# Patient Record
Sex: FEMALE | Race: WHITE | NOT HISPANIC OR LATINO | URBAN - METROPOLITAN AREA
[De-identification: names, ages, dates, MRNs, and addresses within clinical notes are randomized per-mention and may not be internally consistent; named-entity substitution may affect disease eponyms.]

---

## 2021-05-03 ENCOUNTER — PREPPED CHART (OUTPATIENT)
Dept: URBAN - METROPOLITAN AREA CLINIC 40 | Facility: CLINIC | Age: 86
End: 2021-05-03

## 2021-05-03 PROBLEM — H40.013 GLAUCOMA SUSPECT, LOW RISK: Noted: 2021-05-03

## 2021-11-01 ENCOUNTER — FOLLOW UP (OUTPATIENT)
Dept: URBAN - METROPOLITAN AREA CLINIC 40 | Facility: CLINIC | Age: 86
End: 2021-11-01

## 2021-11-01 DIAGNOSIS — H40.013: ICD-10-CM

## 2021-11-01 DIAGNOSIS — H53.2: ICD-10-CM

## 2021-11-01 PROCEDURE — 92250 FUNDUS PHOTOGRAPHY W/I&R: CPT

## 2021-11-01 PROCEDURE — 92014 COMPRE OPH EXAM EST PT 1/>: CPT

## 2021-11-01 ASSESSMENT — TONOMETRY
OD_IOP_MMHG: 13
OS_IOP_MMHG: 16

## 2021-11-01 ASSESSMENT — VISUAL ACUITY
OU_CC: J1
OD_CC: 20/30-2
OS_CC: 20/30

## 2022-05-02 ENCOUNTER — FOLLOW UP (OUTPATIENT)
Dept: URBAN - METROPOLITAN AREA CLINIC 40 | Facility: CLINIC | Age: 87
End: 2022-05-02

## 2022-05-02 DIAGNOSIS — H53.2: ICD-10-CM

## 2022-05-02 DIAGNOSIS — H04.123: ICD-10-CM

## 2022-05-02 DIAGNOSIS — H40.013: ICD-10-CM

## 2022-05-02 PROCEDURE — 92133 CPTRZD OPH DX IMG PST SGM ON: CPT

## 2022-05-02 PROCEDURE — 92012 INTRM OPH EXAM EST PATIENT: CPT

## 2022-05-02 ASSESSMENT — VISUAL ACUITY
OD_CC: 20/30
OU_CC: J1+
OS_CC: 20/30

## 2022-05-02 ASSESSMENT — TONOMETRY
OS_IOP_MMHG: 12
OD_IOP_MMHG: 13

## 2022-06-25 ENCOUNTER — EMERGENCY (EMERGENCY)
Facility: HOSPITAL | Age: 87
LOS: 0 days | Discharge: HOME | End: 2022-06-25
Attending: EMERGENCY MEDICINE
Payer: MEDICARE

## 2022-06-25 VITALS
OXYGEN SATURATION: 98 % | HEART RATE: 71 BPM | RESPIRATION RATE: 16 BRPM | SYSTOLIC BLOOD PRESSURE: 158 MMHG | TEMPERATURE: 98 F | HEIGHT: 58 IN | WEIGHT: 121.92 LBS | DIASTOLIC BLOOD PRESSURE: 80 MMHG

## 2022-06-25 VITALS
OXYGEN SATURATION: 96 % | HEART RATE: 70 BPM | TEMPERATURE: 98 F | RESPIRATION RATE: 17 BRPM | DIASTOLIC BLOOD PRESSURE: 62 MMHG | SYSTOLIC BLOOD PRESSURE: 118 MMHG
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
## 2022-06-25 DIAGNOSIS — S09.90XA UNSPECIFIED INJURY OF HEAD, INITIAL ENCOUNTER: ICD-10-CM
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
## 2022-06-25 DIAGNOSIS — S06.0X0A CONCUSSION WITHOUT LOSS OF CONSCIOUSNESS, INITIAL ENCOUNTER: ICD-10-CM

## 2022-06-25 DIAGNOSIS — Z20.822 CONTACT WITH AND (SUSPECTED) EXPOSURE TO COVID-19: ICD-10-CM

## 2022-06-25 PROCEDURE — 72125 CT NECK SPINE W/O DYE: CPT | Mod: 26,MA

## 2022-06-25 PROCEDURE — 70450 CT HEAD/BRAIN W/O DYE: CPT | Mod: 26,MA

## 2022-06-25 PROCEDURE — 70450 CT HEAD/BRAIN W/O DYE: CPT | Mod: MA

## 2022-06-25 PROCEDURE — 87635 SARS-COV-2 COVID-19 AMP PRB: CPT

## 2022-06-25 PROCEDURE — 99284 EMERGENCY DEPT VISIT MOD MDM: CPT | Mod: 25

## 2022-06-25 PROCEDURE — 72125 CT NECK SPINE W/O DYE: CPT | Mod: MA

## 2022-06-25 PROCEDURE — 99283 EMERGENCY DEPT VISIT LOW MDM: CPT

## 2022-06-25 NOTE — ED PROVIDER NOTE - PATIENT PORTAL LINK FT
You can access the FollowMyHealth Patient Portal offered by [facility] by registering at the following website: [email or web address]. By joining [facility]’s FollowMyHealth portal, you will also be able to view your health information using other applications (apps) compatible with our system.

## 2022-06-25 NOTE — ED PROVIDER NOTE - CLINICAL SUMMARY MEDICAL DECISION MAKING FREE TEXT BOX
at [facility] mental status scans negative return to ed for intractable HA, persistent vomiting, or new onset motor/sensory deficits

## 2022-06-25 NOTE — ED ADULT TRIAGE NOTE - CHIEF COMPLAINT QUOTE
walking to bathroom sustained mechanical fall, not on anticoagulants, no LOC. + head strike. hematoma to right posterior occiput, lump noted. no active bleeding. pt at baseline mental status, GCS 15. pt made NA and taken to catscan.

## 2022-06-25 NOTE — ED PROVIDER NOTE - OBJECTIVE STATEMENT
pt presents after mechanical fall from nursing home denies complaints no blood thinners no vision chnages. not on blood thinners. no chest pain no sob no abd pain no loc

## 2022-06-25 NOTE — ED ADULT TRIAGE NOTE - ARRIVAL FROM
Patient is dead based on Cardiopulmonary criteria including absent breath sounds, pulselessness and/or asystole
Nursing home

## 2022-06-25 NOTE — ED ADULT NURSE NOTE - OBJECTIVE STATEMENT
Pt was walking to bathroom sustained mechanical fall, not on anticoagulants, no LOC. + head strike. hematoma to right posterior occiput, lump noted. no active bleeding. pt at baseline mental status, GCS 15. pt made NA and taken to catscan. Covid swab completed.

## 2022-10-29 ENCOUNTER — TRANSCRIPTION ENCOUNTER (OUTPATIENT)
Age: 87
End: 2022-10-29

## 2022-10-29 ENCOUNTER — INPATIENT (INPATIENT)
Facility: HOSPITAL | Age: 87
LOS: 5 days | Discharge: NURSING, REHAB OR LONG-TERM CARE | DRG: 73 | End: 2022-11-04
Attending: HOSPITALIST | Admitting: FAMILY MEDICINE
Payer: MEDICARE

## 2022-10-29 VITALS
HEART RATE: 70 BPM | RESPIRATION RATE: 15 BRPM | WEIGHT: 126.99 LBS | OXYGEN SATURATION: 95 % | HEIGHT: 58 IN | SYSTOLIC BLOOD PRESSURE: 124 MMHG | DIASTOLIC BLOOD PRESSURE: 58 MMHG | TEMPERATURE: 98 F

## 2022-10-29 DIAGNOSIS — R77.8 OTHER SPECIFIED ABNORMALITIES OF PLASMA PROTEINS: ICD-10-CM

## 2022-10-29 LAB
ALBUMIN SERPL ELPH-MCNC: 3.3 G/DL — SIGNIFICANT CHANGE UP (ref 3.3–5)
ALP SERPL-CCNC: 56 U/L — SIGNIFICANT CHANGE UP (ref 40–120)
ALT FLD-CCNC: 23 U/L — SIGNIFICANT CHANGE UP (ref 12–78)
ANION GAP SERPL CALC-SCNC: 5 MMOL/L — SIGNIFICANT CHANGE UP (ref 5–17)
APPEARANCE UR: CLEAR — SIGNIFICANT CHANGE UP
APTT BLD: 27.5 SEC — SIGNIFICANT CHANGE UP (ref 27.5–35.5)
AST SERPL-CCNC: 18 U/L — SIGNIFICANT CHANGE UP (ref 15–37)
BASOPHILS # BLD AUTO: 0.05 K/UL — SIGNIFICANT CHANGE UP (ref 0–0.2)
BASOPHILS NFR BLD AUTO: 0.8 % — SIGNIFICANT CHANGE UP (ref 0–2)
BILIRUB SERPL-MCNC: 0.3 MG/DL — SIGNIFICANT CHANGE UP (ref 0.2–1.2)
BILIRUB UR-MCNC: NEGATIVE — SIGNIFICANT CHANGE UP
BUN SERPL-MCNC: 27 MG/DL — HIGH (ref 7–23)
CALCIUM SERPL-MCNC: 9.2 MG/DL — SIGNIFICANT CHANGE UP (ref 8.5–10.1)
CHLORIDE SERPL-SCNC: 104 MMOL/L — SIGNIFICANT CHANGE UP (ref 96–108)
CO2 SERPL-SCNC: 26 MMOL/L — SIGNIFICANT CHANGE UP (ref 22–31)
COLOR SPEC: YELLOW — SIGNIFICANT CHANGE UP
CREAT SERPL-MCNC: 0.67 MG/DL — SIGNIFICANT CHANGE UP (ref 0.5–1.3)
DIFF PNL FLD: NEGATIVE — SIGNIFICANT CHANGE UP
EGFR: 84 ML/MIN/1.73M2 — SIGNIFICANT CHANGE UP
EOSINOPHIL # BLD AUTO: 0.31 K/UL — SIGNIFICANT CHANGE UP (ref 0–0.5)
EOSINOPHIL NFR BLD AUTO: 4.9 % — SIGNIFICANT CHANGE UP (ref 0–6)
FLUAV AG NPH QL: SIGNIFICANT CHANGE UP
FLUBV AG NPH QL: SIGNIFICANT CHANGE UP
GLUCOSE SERPL-MCNC: 80 MG/DL — SIGNIFICANT CHANGE UP (ref 70–99)
GLUCOSE UR QL: NEGATIVE — SIGNIFICANT CHANGE UP
HCT VFR BLD CALC: 32.5 % — LOW (ref 34.5–45)
HGB BLD-MCNC: 10.6 G/DL — LOW (ref 11.5–15.5)
IMM GRANULOCYTES NFR BLD AUTO: 0.2 % — SIGNIFICANT CHANGE UP (ref 0–0.9)
INR BLD: 0.97 RATIO — SIGNIFICANT CHANGE UP (ref 0.88–1.16)
KETONES UR-MCNC: NEGATIVE — SIGNIFICANT CHANGE UP
LEUKOCYTE ESTERASE UR-ACNC: NEGATIVE — SIGNIFICANT CHANGE UP
LYMPHOCYTES # BLD AUTO: 1.56 K/UL — SIGNIFICANT CHANGE UP (ref 1–3.3)
LYMPHOCYTES # BLD AUTO: 24.4 % — SIGNIFICANT CHANGE UP (ref 13–44)
MCHC RBC-ENTMCNC: 29.7 PG — SIGNIFICANT CHANGE UP (ref 27–34)
MCHC RBC-ENTMCNC: 32.6 GM/DL — SIGNIFICANT CHANGE UP (ref 32–36)
MCV RBC AUTO: 91 FL — SIGNIFICANT CHANGE UP (ref 80–100)
MONOCYTES # BLD AUTO: 0.63 K/UL — SIGNIFICANT CHANGE UP (ref 0–0.9)
MONOCYTES NFR BLD AUTO: 9.9 % — SIGNIFICANT CHANGE UP (ref 2–14)
NEUTROPHILS # BLD AUTO: 3.83 K/UL — SIGNIFICANT CHANGE UP (ref 1.8–7.4)
NEUTROPHILS NFR BLD AUTO: 59.8 % — SIGNIFICANT CHANGE UP (ref 43–77)
NITRITE UR-MCNC: NEGATIVE — SIGNIFICANT CHANGE UP
PH UR: 6.5 — SIGNIFICANT CHANGE UP (ref 5–8)
PLATELET # BLD AUTO: 281 K/UL — SIGNIFICANT CHANGE UP (ref 150–400)
POTASSIUM SERPL-MCNC: 4.3 MMOL/L — SIGNIFICANT CHANGE UP (ref 3.5–5.3)
POTASSIUM SERPL-SCNC: 4.3 MMOL/L — SIGNIFICANT CHANGE UP (ref 3.5–5.3)
PROT SERPL-MCNC: 7.8 GM/DL — SIGNIFICANT CHANGE UP (ref 6–8.3)
PROT UR-MCNC: NEGATIVE — SIGNIFICANT CHANGE UP
PROTHROM AB SERPL-ACNC: 11.3 SEC — SIGNIFICANT CHANGE UP (ref 10.5–13.4)
RBC # BLD: 3.57 M/UL — LOW (ref 3.8–5.2)
RBC # FLD: 12.3 % — SIGNIFICANT CHANGE UP (ref 10.3–14.5)
RSV RNA NPH QL NAA+NON-PROBE: SIGNIFICANT CHANGE UP
SARS-COV-2 RNA SPEC QL NAA+PROBE: DETECTED
SODIUM SERPL-SCNC: 135 MMOL/L — SIGNIFICANT CHANGE UP (ref 135–145)
SP GR SPEC: 1.01 — SIGNIFICANT CHANGE UP (ref 1.01–1.02)
TROPONIN I, HIGH SENSITIVITY RESULT: 256.37 NG/L — HIGH
UROBILINOGEN FLD QL: NEGATIVE — SIGNIFICANT CHANGE UP
WBC # BLD: 6.39 K/UL — SIGNIFICANT CHANGE UP (ref 3.8–10.5)
WBC # FLD AUTO: 6.39 K/UL — SIGNIFICANT CHANGE UP (ref 3.8–10.5)

## 2022-10-29 PROCEDURE — 72125 CT NECK SPINE W/O DYE: CPT | Mod: 26,MA

## 2022-10-29 PROCEDURE — 72157 MRI CHEST SPINE W/O & W/DYE: CPT

## 2022-10-29 PROCEDURE — 72158 MRI LUMBAR SPINE W/O & W/DYE: CPT

## 2022-10-29 PROCEDURE — 83010 ASSAY OF HAPTOGLOBIN QUANT: CPT

## 2022-10-29 PROCEDURE — U0003: CPT

## 2022-10-29 PROCEDURE — U0005: CPT

## 2022-10-29 PROCEDURE — 84484 ASSAY OF TROPONIN QUANT: CPT

## 2022-10-29 PROCEDURE — 97162 PT EVAL MOD COMPLEX 30 MIN: CPT | Mod: GP

## 2022-10-29 PROCEDURE — 85045 AUTOMATED RETICULOCYTE COUNT: CPT

## 2022-10-29 PROCEDURE — 86140 C-REACTIVE PROTEIN: CPT

## 2022-10-29 PROCEDURE — C1729: CPT

## 2022-10-29 PROCEDURE — 71552 MRI CHEST W/O & W/DYE: CPT

## 2022-10-29 PROCEDURE — 87389 HIV-1 AG W/HIV-1&-2 AB AG IA: CPT

## 2022-10-29 PROCEDURE — 84443 ASSAY THYROID STIM HORMONE: CPT

## 2022-10-29 PROCEDURE — 85652 RBC SED RATE AUTOMATED: CPT

## 2022-10-29 PROCEDURE — 70450 CT HEAD/BRAIN W/O DYE: CPT | Mod: 26,MA

## 2022-10-29 PROCEDURE — 70547 MR ANGIOGRAPHY NECK W/O DYE: CPT

## 2022-10-29 PROCEDURE — 83550 IRON BINDING TEST: CPT

## 2022-10-29 PROCEDURE — 71045 X-RAY EXAM CHEST 1 VIEW: CPT | Mod: 26

## 2022-10-29 PROCEDURE — 85027 COMPLETE CBC AUTOMATED: CPT

## 2022-10-29 PROCEDURE — 83615 LACTATE (LD) (LDH) ENZYME: CPT

## 2022-10-29 PROCEDURE — 36415 COLL VENOUS BLD VENIPUNCTURE: CPT

## 2022-10-29 PROCEDURE — 80061 LIPID PANEL: CPT

## 2022-10-29 PROCEDURE — 97165 OT EVAL LOW COMPLEX 30 MIN: CPT | Mod: GO

## 2022-10-29 PROCEDURE — 72156 MRI NECK SPINE W/O & W/DYE: CPT

## 2022-10-29 PROCEDURE — C1769: CPT

## 2022-10-29 PROCEDURE — 97116 GAIT TRAINING THERAPY: CPT | Mod: GP

## 2022-10-29 PROCEDURE — 70553 MRI BRAIN STEM W/O & W/DYE: CPT

## 2022-10-29 PROCEDURE — C1887: CPT

## 2022-10-29 PROCEDURE — 93005 ELECTROCARDIOGRAM TRACING: CPT

## 2022-10-29 PROCEDURE — 99223 1ST HOSP IP/OBS HIGH 75: CPT

## 2022-10-29 PROCEDURE — 86703 HIV-1/HIV-2 1 RESULT ANTBDY: CPT

## 2022-10-29 PROCEDURE — 73130 X-RAY EXAM OF HAND: CPT | Mod: 26,RT

## 2022-10-29 PROCEDURE — 93010 ELECTROCARDIOGRAM REPORT: CPT

## 2022-10-29 PROCEDURE — 80048 BASIC METABOLIC PNL TOTAL CA: CPT

## 2022-10-29 PROCEDURE — 83036 HEMOGLOBIN GLYCOSYLATED A1C: CPT

## 2022-10-29 PROCEDURE — 72126 CT NECK SPINE W/DYE: CPT

## 2022-10-29 PROCEDURE — 85025 COMPLETE CBC W/AUTO DIFF WBC: CPT

## 2022-10-29 PROCEDURE — 97110 THERAPEUTIC EXERCISES: CPT | Mod: GO

## 2022-10-29 PROCEDURE — 93306 TTE W/DOPPLER COMPLETE: CPT

## 2022-10-29 PROCEDURE — 82607 VITAMIN B-12: CPT

## 2022-10-29 PROCEDURE — A9579: CPT

## 2022-10-29 PROCEDURE — 97535 SELF CARE MNGMENT TRAINING: CPT | Mod: GO

## 2022-10-29 PROCEDURE — 83540 ASSAY OF IRON: CPT

## 2022-10-29 PROCEDURE — 97530 THERAPEUTIC ACTIVITIES: CPT | Mod: GP

## 2022-10-29 PROCEDURE — 80074 ACUTE HEPATITIS PANEL: CPT

## 2022-10-29 PROCEDURE — 99285 EMERGENCY DEPT VISIT HI MDM: CPT | Mod: CS

## 2022-10-29 PROCEDURE — C1894: CPT

## 2022-10-29 PROCEDURE — 76536 US EXAM OF HEAD AND NECK: CPT

## 2022-10-29 PROCEDURE — 70544 MR ANGIOGRAPHY HEAD W/O DYE: CPT

## 2022-10-29 PROCEDURE — 82728 ASSAY OF FERRITIN: CPT

## 2022-10-29 RX ORDER — ATORVASTATIN CALCIUM 80 MG/1
10 TABLET, FILM COATED ORAL AT BEDTIME
Refills: 0 | Status: DISCONTINUED | OUTPATIENT
Start: 2022-10-29 | End: 2022-10-29

## 2022-10-29 RX ORDER — SUCRALFATE 1 G
1 TABLET ORAL
Refills: 0 | Status: DISCONTINUED | OUTPATIENT
Start: 2022-10-29 | End: 2022-11-04

## 2022-10-29 RX ORDER — ONDANSETRON 8 MG/1
4 TABLET, FILM COATED ORAL EVERY 8 HOURS
Refills: 0 | Status: DISCONTINUED | OUTPATIENT
Start: 2022-10-29 | End: 2022-11-04

## 2022-10-29 RX ORDER — LANOLIN ALCOHOL/MO/W.PET/CERES
3 CREAM (GRAM) TOPICAL AT BEDTIME
Refills: 0 | Status: DISCONTINUED | OUTPATIENT
Start: 2022-10-29 | End: 2022-11-04

## 2022-10-29 RX ORDER — ASPIRIN/CALCIUM CARB/MAGNESIUM 324 MG
81 TABLET ORAL DAILY
Refills: 0 | Status: DISCONTINUED | OUTPATIENT
Start: 2022-10-29 | End: 2022-11-04

## 2022-10-29 RX ORDER — METOPROLOL TARTRATE 50 MG
25 TABLET ORAL DAILY
Refills: 0 | Status: DISCONTINUED | OUTPATIENT
Start: 2022-10-29 | End: 2022-11-04

## 2022-10-29 RX ORDER — ASPIRIN/CALCIUM CARB/MAGNESIUM 324 MG
324 TABLET ORAL ONCE
Refills: 0 | Status: COMPLETED | OUTPATIENT
Start: 2022-10-29 | End: 2022-10-29

## 2022-10-29 RX ORDER — ACETAMINOPHEN 500 MG
650 TABLET ORAL EVERY 6 HOURS
Refills: 0 | Status: ACTIVE | OUTPATIENT
Start: 2022-10-29 | End: 2023-09-27

## 2022-10-29 RX ORDER — HEPARIN SODIUM 5000 [USP'U]/ML
5000 INJECTION INTRAVENOUS; SUBCUTANEOUS EVERY 8 HOURS
Refills: 0 | Status: DISCONTINUED | OUTPATIENT
Start: 2022-10-29 | End: 2022-11-04

## 2022-10-29 RX ORDER — LEVOTHYROXINE SODIUM 125 MCG
100 TABLET ORAL DAILY
Refills: 0 | Status: DISCONTINUED | OUTPATIENT
Start: 2022-10-29 | End: 2022-11-04

## 2022-10-29 RX ADMIN — Medication 25 MILLIGRAM(S): at 23:42

## 2022-10-29 RX ADMIN — Medication 324 MILLIGRAM(S): at 16:17

## 2022-10-29 NOTE — H&P ADULT - ASSESSMENT
89-year-old female who was sent to the emergency room from [facility] because she developed right hand to finger paralysis.  The patient's symptoms started last night and she did not want to come to the ER last night waiting for her symptoms to improve in the morning.  After her symptoms did not improve it was referred to the emergency room. The patient was diagnosed with Covid 19 infection a month ago. The patient was recently diagnosed with right breast mass and biopsy was recommended and scheduled as an outpatient.  The patient is also complaining of a Baker's cyst in the right knee which she wants to be evaluated for by orthopedics.  assessment Dx:                       1. 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 R breast mass                        3. Thoracic vertebral metastasis                        4. Elevated Troponin  r/o MI                        5. COVID 19 positive likely old infection                       6. Santiago cyst R knee                        7.  LS stenosis                        8.  HTN                       9.  Hypothyroidism                       10. RA     Plan:    admit to Telemetry r/o CVA, R/O MI                medications: as per med rec.                VTEP: Heparin                Labs: troponins, cbc, bmp                Radiology: CTH, MRI brain                Cardiac diagnostics: EKG , Troponins trending                Consults: Neurology, Cardiology, Ortho                Advance Directive: full code,

## 2022-10-29 NOTE — ED ADULT NURSE REASSESSMENT NOTE - NS ED NURSE REASSESS COMMENT FT1
Assumed care of Pt from ROBERT Weston. Pt received resting comfortably in stretcher. Vital signs reassessed and stable at this time. Provided Pt with additional pillows as per request. Provided Pt with phone to call son. Administered aspirin as per order. No additional requests or complaints. Patient safety maintained. Will continue to monitor. Debridement Text: The wound edges were debrided prior to proceeding with the closure to facilitate wound healing.

## 2022-10-29 NOTE — ED PROVIDER NOTE - OBJECTIVE STATEMENT
89 yr old female with PMHx of arthritis, TIA presents to the ED c/o weakness to the right digits. Pt states she suddenly couldn't move fingers on right hand. Pt denies numb or tingling feeling. Pt can feel with fingers. PT has had TIA before. Pt is on aspirin but not any blood thinners. Pt also notes of neck pain 89 yr old female with PMHx of arthritis, TIA presents to the ED c/o weakness to the right digits since yesterday night. Pt states she suddenly couldn't move fingers on right hand. Pt denies numb or tingling feeling. Pt can feel with fingers. Pt is on aspirin but not any blood thinners. Pt also notes of neck pain

## 2022-10-29 NOTE — H&P ADULT - NSHPREVIEWOFSYSTEMS_GEN_ALL_CORE
ROS:   Eyes: no changes in vision  ENT/Mouth: no changes    Cardiovascular: no chest pain    Respiratory: no SOB    Gastrointestinal: no diarrhea, no nausea, no vomiting    Genitourinary: no dysuria    Breast: no pain    Musculoskeletal: no pain, Right wrist weakness unable to extend however she is able to flex  Wrist and fingers  Integumentary: no itching    Neurological: No Headache, no tremor,    Endocrine: no excessive thirst,     Allergic/Immunologic: no itching ROS:   Eyes: no changes in vision  ENT/Mouth: no changes    Cardiovascular: no chest pain    Respiratory: no SOB    Gastrointestinal: no diarrhea, no nausea, no vomiting    Genitourinary: no dysuria    Breast: no pain    Musculoskeletal: no pain, Right wrist weakness unable to extend however she is able to flex  Wrist and fingers  Integumentary: no itching    Neurological: No Headache, no tremor, no slurred speech, R wrist weakness , unable to extend.     Endocrine: no excessive thirst,     Allergic/Immunologic: no itching

## 2022-10-29 NOTE — ED ADULT NURSE REASSESSMENT NOTE - NS ED NURSE REASSESS COMMENT FT1
Pt resting comfortably in stretcher. Vital signs reassessed and stable at this time. Provided Pt with menu to order dinner, call to food and nutrition to place Pt's order. Repositioned Pt in bed to assist with comfort. No additional requests or complaints. Patient safety maintained. Will continue to monitor.

## 2022-10-29 NOTE — H&P ADULT - NSHPPHYSICALEXAM_GEN_ALL_CORE
Physical Exam: Vital Signs Last 24 Hrs  T(C): 37.1 (29 Oct 2022 15:14), Max: 37.1 (29 Oct 2022 15:14)  T(F): 98.7 (29 Oct 2022 15:14), Max: 98.7 (29 Oct 2022 15:14)  HR: 82 (29 Oct 2022 16:00) (70 - 87)  BP: 137/68 (29 Oct 2022 16:00) (124/58 - 141/77)  BP(mean): 90 (29 Oct 2022 16:00) (90 - 95)  RR: 18 (29 Oct 2022 16:00) (15 - 18)  SpO2: 98% (29 Oct 2022 16:00) (95% - 98%)    Parameters below as of 29 Oct 2022 16:00  Patient On (Oxygen Delivery Method): room air      HEENT: PRRL EOMI    MOUTH/TEETH/GUMS: Clear    NECK: no JVD    LUNGS: Clear    HEART: S1,S2 RR    ABDOMEN: soft nontender    EXTREMITIES:  no pedal edema    MUSCULOSKELETAL: Arthritic changes, unable to extend right wrist    NEURO: no tremor,Right wrist drop, unable to extend, able to flex right wrist and fingers    SKIN: no rash    : CVA negative, Physical Exam: Vital Signs Last 24 Hrs  T(C): 37.1 (29 Oct 2022 15:14), Max: 37.1 (29 Oct 2022 15:14)  T(F): 98.7 (29 Oct 2022 15:14), Max: 98.7 (29 Oct 2022 15:14)  HR: 82 (29 Oct 2022 16:00) (70 - 87)  BP: 137/68 (29 Oct 2022 16:00) (124/58 - 141/77)  BP(mean): 90 (29 Oct 2022 16:00) (90 - 95)  RR: 18 (29 Oct 2022 16:00) (15 - 18)  SpO2: 98% (29 Oct 2022 16:00) (95% - 98%)    Parameters below as of 29 Oct 2022 16:00  Patient On (Oxygen Delivery Method): room air      HEENT: PRRL EOMI    MOUTH/TEETH/GUMS: Clear    NECK: no JVD    LUNGS: Clear    HEART: S1,S2 RR    ABDOMEN: soft nontender    EXTREMITIES:  no pedal edema    MUSCULOSKELETAL: Arthritic changes, unable to extend right wrist    NEURO: AO x 3,  no tremor,Right wrist drop, unable to extend, able to flex right wrist and fingers    SKIN: no rash    : CVA negative,

## 2022-10-29 NOTE — ED ADULT TRIAGE NOTE - CHIEF COMPLAINT QUOTE
Pt BIBEMS from [facility], states her R thumb, 2nd, and 3rd fingers are "paralyzed" since last night. Denies recent falls/trauma. Hx of arthritis. No neuro deficits.

## 2022-10-29 NOTE — PROVIDER CONTACT NOTE (OTHER) - SITUATION
Left consult with Orthopedic Resident (Samir).
Dr. Khoury aware of patient/consult.
Left consult with service. Spoke to Matti.

## 2022-10-29 NOTE — ED PROVIDER NOTE - PROGRESS NOTE DETAILS
CT w/o acute infarct or ICH, chronic lacunar infarct. Trop+, ekg nonischemic. Will give full dose asa, pt will require admission for XVA r/o given hx, out of timeframe for tpa given onset of sx last night

## 2022-10-29 NOTE — ED PROVIDER NOTE - NS ED ROS FT
Constitutional: negative for fevers/chills  HENT: no hearing problems, sore throat, nasal congestion  Eyes: no visual disturbances  Neck: no neck stiffness or neck pain  Cardiovascular: no chest pain, no palpitations, no edema  Respiratory: no cough, no shortness of breath  Musculoskeletal: poor ROM of right digits 1,2,3   Gastrointestinal: no abdominal pain, nausea, vomiting  : no dysuria or hematuria or polyuria  Neuro: no headaches, no numbness or tingling, no dizziness  Skin: no rashes or wounds

## 2022-10-29 NOTE — PATIENT PROFILE ADULT - FALL HARM RISK - HARM RISK INTERVENTIONS

## 2022-10-29 NOTE — ED PROVIDER NOTE - CLINICAL SUMMARY MEDICAL DECISION MAKING FREE TEXT BOX
Pt history of TIA and arthritis, weakness of digits without pain or sensory deficit. Workup for possible TIA or CVA

## 2022-10-29 NOTE — H&P ADULT - NSHPLABSRESULTS_GEN_ALL_CORE
10.6   6.39  )-----------( 281      ( 29 Oct 2022 13:23 )             32.5       10-29    135  |  104  |  27<H>  ----------------------------<  80  4.3   |  26  |  0.67    Ca    9.2      29 Oct 2022 13:23    TPro  7.8  /  Alb  3.3  /  TBili  0.3  /  DBili  x   /  AST  18  /  ALT  23  /  AlkPhos  56  10-29    Troponin 256,  Covid 19 positive, EKG NSR with PVC,    CT head chronic small vessel ischemic changes and lacunar infarct no acute hemorrhage or mass.  CT C-spine multilevel cervical spondylosis no fracture  Chest x-ray question blastic metastatic disease of the lower thoracic vertebral body no acute lung findings.

## 2022-10-29 NOTE — ED ADULT NURSE REASSESSMENT NOTE - NS ED NURSE REASSESS COMMENT FT1
Reviewed COVID+ status with Pt. As per Pt, she tested positive approximately 4 weeks ago. Nursing supervision made aware.

## 2022-10-29 NOTE — PROVIDER CONTACT NOTE (OTHER) - NAME OF MD/NP/PA/DO NOTIFIED:
Dr. Sun, [facility]
Dr. Tracey - Cardio
Jeremy Rodriges

## 2022-10-29 NOTE — ED ADULT NURSE NOTE - NSICDXFAMILYHX_GEN_ALL_CORE_FT
Worker met with Pt who requested worker call staff at [facility] to request clothing be brought in for Pt  Worker provided Pt with the phone number for staff and agreed to call to request clothing be brought in  Worker attempted to call Pt's CM at the group home and was unable to get through using extension previously provided  Worker left voicemail for  requesting clothing be brought in for Pt and provided update on Pt's progress in treatment  FAMILY HISTORY:  No pertinent family history in first degree relatives

## 2022-10-29 NOTE — PHARMACOTHERAPY INTERVENTION NOTE - COMMENTS
Medication history complete, patient is from [facility], reviewed and confirmed medication with list provided by facility.

## 2022-10-29 NOTE — H&P ADULT - HISTORY OF PRESENT ILLNESS
HPI: The patient is an 89-year-old female who was sent to the emergency room from [facility] because she developed right hand to finger paralysis.  The patient's symptoms started last night and she did not want to come to the ER last night waiting for her symptoms to improve in the morning.  After her symptoms did not improve it was referred to the emergency room. The patient was diagnosed with Covid 19 infection a month ago. The patient was recently diagnosed with right breast mass and biopsy was recommended and scheduled as an outpatient.  The patient is also complaining of a Baker's cyst in the right knee which she wants to be evaluated for by orthopedics.    PMHx: Recently diagnosed right breast mass,TIA ×4, rheumatoid arthritis, lumbar spinal stenosis, HLD, hypothyroidism,    PSHx: Denies any major surgeries    Family Hx: Father  from kidney failure at age 99, patient mother had DJD    Social Hx.: not smoking, no alcohol, Moved into [facility] 6 months ago from [place].

## 2022-10-30 DIAGNOSIS — I38 ENDOCARDITIS, VALVE UNSPECIFIED: ICD-10-CM

## 2022-10-30 DIAGNOSIS — I10 ESSENTIAL (PRIMARY) HYPERTENSION: ICD-10-CM

## 2022-10-30 DIAGNOSIS — R29.898 OTHER SYMPTOMS AND SIGNS INVOLVING THE MUSCULOSKELETAL SYSTEM: ICD-10-CM

## 2022-10-30 DIAGNOSIS — I48.0 PAROXYSMAL ATRIAL FIBRILLATION: ICD-10-CM

## 2022-10-30 DIAGNOSIS — R77.8 OTHER SPECIFIED ABNORMALITIES OF PLASMA PROTEINS: ICD-10-CM

## 2022-10-30 LAB
ADD ON TEST-SPECIMEN IN LAB: SIGNIFICANT CHANGE UP
ANION GAP SERPL CALC-SCNC: 5 MMOL/L — SIGNIFICANT CHANGE UP (ref 5–17)
BUN SERPL-MCNC: 24 MG/DL — HIGH (ref 7–23)
CALCIUM SERPL-MCNC: 8.9 MG/DL — SIGNIFICANT CHANGE UP (ref 8.5–10.1)
CHLORIDE SERPL-SCNC: 104 MMOL/L — SIGNIFICANT CHANGE UP (ref 96–108)
CO2 SERPL-SCNC: 27 MMOL/L — SIGNIFICANT CHANGE UP (ref 22–31)
CREAT SERPL-MCNC: 0.65 MG/DL — SIGNIFICANT CHANGE UP (ref 0.5–1.3)
EGFR: 84 ML/MIN/1.73M2 — SIGNIFICANT CHANGE UP
GLUCOSE SERPL-MCNC: 100 MG/DL — HIGH (ref 70–99)
HCT VFR BLD CALC: 32 % — LOW (ref 34.5–45)
HGB BLD-MCNC: 9.9 G/DL — LOW (ref 11.5–15.5)
MCHC RBC-ENTMCNC: 28.3 PG — SIGNIFICANT CHANGE UP (ref 27–34)
MCHC RBC-ENTMCNC: 30.9 GM/DL — LOW (ref 32–36)
MCV RBC AUTO: 91.4 FL — SIGNIFICANT CHANGE UP (ref 80–100)
PLATELET # BLD AUTO: 230 K/UL — SIGNIFICANT CHANGE UP (ref 150–400)
POTASSIUM SERPL-MCNC: 4.2 MMOL/L — SIGNIFICANT CHANGE UP (ref 3.5–5.3)
POTASSIUM SERPL-SCNC: 4.2 MMOL/L — SIGNIFICANT CHANGE UP (ref 3.5–5.3)
RBC # BLD: 3.5 M/UL — LOW (ref 3.8–5.2)
RBC # FLD: 12.4 % — SIGNIFICANT CHANGE UP (ref 10.3–14.5)
SODIUM SERPL-SCNC: 136 MMOL/L — SIGNIFICANT CHANGE UP (ref 135–145)
TROPONIN I, HIGH SENSITIVITY RESULT: 243.98 NG/L — HIGH
WBC # BLD: 4.69 K/UL — SIGNIFICANT CHANGE UP (ref 3.8–10.5)
WBC # FLD AUTO: 4.69 K/UL — SIGNIFICANT CHANGE UP (ref 3.8–10.5)

## 2022-10-30 PROCEDURE — 99223 1ST HOSP IP/OBS HIGH 75: CPT

## 2022-10-30 PROCEDURE — 99222 1ST HOSP IP/OBS MODERATE 55: CPT

## 2022-10-30 PROCEDURE — 93010 ELECTROCARDIOGRAM REPORT: CPT

## 2022-10-30 PROCEDURE — 99233 SBSQ HOSP IP/OBS HIGH 50: CPT

## 2022-10-30 RX ORDER — SENNA PLUS 8.6 MG/1
1 TABLET ORAL ONCE
Refills: 0 | Status: COMPLETED | OUTPATIENT
Start: 2022-10-30 | End: 2022-10-30

## 2022-10-30 RX ADMIN — Medication 100 MICROGRAM(S): at 06:33

## 2022-10-30 RX ADMIN — HEPARIN SODIUM 5000 UNIT(S): 5000 INJECTION INTRAVENOUS; SUBCUTANEOUS at 10:26

## 2022-10-30 RX ADMIN — HEPARIN SODIUM 5000 UNIT(S): 5000 INJECTION INTRAVENOUS; SUBCUTANEOUS at 00:26

## 2022-10-30 RX ADMIN — Medication 1 TABLET(S): at 00:21

## 2022-10-30 RX ADMIN — Medication 40 MILLIGRAM(S): at 00:22

## 2022-10-30 RX ADMIN — HEPARIN SODIUM 5000 UNIT(S): 5000 INJECTION INTRAVENOUS; SUBCUTANEOUS at 18:27

## 2022-10-30 RX ADMIN — Medication 1 GRAM(S): at 15:14

## 2022-10-30 RX ADMIN — Medication 81 MILLIGRAM(S): at 10:26

## 2022-10-30 RX ADMIN — Medication 40 MILLIGRAM(S): at 22:27

## 2022-10-30 RX ADMIN — Medication 25 MILLIGRAM(S): at 10:25

## 2022-10-30 RX ADMIN — SENNA PLUS 1 TABLET(S): 8.6 TABLET ORAL at 22:27

## 2022-10-30 NOTE — CONSULT NOTE ADULT - PROBLEM SELECTOR RECOMMENDATION 9
possible CVA vs TIA , with severe osteoarthritic changes of hands  follow up neurologist    patient had prior hx TIA . traumatic ICB while AC with falls , was taken off  AC more than year ago     Dr sunita askew [place]  [phone]  ( will call on monday )

## 2022-10-30 NOTE — CONSULT NOTE ADULT - PROBLEM SELECTOR RECOMMENDATION 5
Prior MV repair  with [facility] , will obtain echo         patient has anemia worsening , monitor hemoglobin

## 2022-10-30 NOTE — CONSULT NOTE ADULT - SUBJECTIVE AND OBJECTIVE BOX
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  She reports that she noticed a right wrist drop in the afternoon while watching TV, no associated pain.  She says it seemed normal earlier during the day, and she only noticed it when she reached for the remote control of the TV.  She has a history of a right breast mass which is pending biopsy.    Head CT: nonspecific white matter changes, atrophy, old cerebellar lacunes  CXR: questionable blastic metastatic disease of lower thoracic vertebral body  Xray right hand: negative    Social Hx:  Nonsmoker, no drug or alcohol use    MEDICATIONS  (STANDING):  aspirin enteric coated 81 milliGRAM(s) Oral daily  calcium carbonate 1250 mG  + Vitamin D (OsCal 500 + D) 1 Tablet(s) Oral daily  heparin   Injectable 5000 Unit(s) SubCutaneous every 8 hours  levothyroxine 100 MICROGram(s) Oral daily  metoprolol succinate ER 25 milliGRAM(s) Oral daily  pravastatin 40 milliGRAM(s) Oral at bedtime  sucralfate 1 Gram(s) Oral two times a day       Allergies  cinnamon (Other)  Drug Allergies Not Recorded      ROS: Pertinent positives in HPI, all other ROS were reviewed and are negative.      Vital Signs Last 24 Hrs  T(C): 36.4 (30 Oct 2022 07:24), Max: 37.2 (29 Oct 2022 20:25)  T(F): 97.6 (30 Oct 2022 07:24), Max: 98.9 (29 Oct 2022 20:25)  HR: 69 (30 Oct 2022 07:24) (69 - 87)  BP: 150/56 (30 Oct 2022 07:24) (119/88 - 150/56)  BP(mean): 95 (29 Oct 2022 20:25) (90 - 95)  RR: 17 (30 Oct 2022 07:24) (15 - 21)  SpO2: 98% (30 Oct 2022 07:24) (95% - 98%)    Parameters below as of 30 Oct 2022 07:24  Patient On (Oxygen Delivery Method): room air      Neurological exam:  HF: A x O x 3. Appropriately interactive, normal affect. Speech fluent, No Aphasia or paraphasic errors. Naming /repetition intact   CN: BRIDGETTE, EOMI, VFF, facial sensation normal, no NLFD, tongue midline, Palate moves equally, SCM equal bilaterally  Motor: +weakness 0/5 in right wrist extension, subtle weakness 4+/5 right brachioradialis, able to extend fingers on right, normal finger flexion, wrist flexion, and finger flexion.  No other focal weakness in LUE/LLE/RLE.      Sens: Intact to light touch / VS    Reflexes: Symmetric and normal . BJ 2+, BR 2+, KJ 2+, AJ 2+, downgoing toes b/l  Coord:  No FNFA, dysmetria, PAUL intact     NIHSS: 0    Labs:   10-30    136  |  104  |  24<H>  ----------------------------<  100<H>  4.2   |  27  |  0.65    Ca    8.9      30 Oct 2022 06:52    TPro  7.8  /  Alb  3.3  /  TBili  0.3  /  DBili  x   /  AST  18  /  ALT  23  /  AlkPhos  56  10-29                              9.9    4.69  )-----------( 230      ( 30 Oct 2022 06:52 )             32.0           A/P:   89 yo woman with an acute onset of a right wrist drop.  Differential Diagnosis includes stroke affecting right hand cortical representation, metastatic lesion, radial neuropathy, brachial plexopathy.    Recommend:  1. MRI brain pending  2. Aspirin 81mg daily  3. Intensive statin therapy, target LDL < 70   4. Once patient is beyond 24 hours from symptom onset, can gradually optimize BP control  5. If MRI brain is negative, recommend MRI right brachial plexus c/s gadolinium  6. 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  She reports that she noticed a right wrist drop in the afternoon while watching TV, no associated pain.  She says it seemed normal earlier during the day, and she only noticed it when she reached for the remote control of the TV.  She has a history of a right breast mass which is pending biopsy.    Head CT: nonspecific white matter changes, atrophy, old cerebellar lacunes  CXR: questionable blastic metastatic disease of lower thoracic vertebral body  Xray right hand: negative    Social Hx:  Nonsmoker, no drug or alcohol use    MEDICATIONS  (STANDING):  aspirin enteric coated 81 milliGRAM(s) Oral daily  calcium carbonate 1250 mG  + Vitamin D (OsCal 500 + D) 1 Tablet(s) Oral daily  heparin   Injectable 5000 Unit(s) SubCutaneous every 8 hours  levothyroxine 100 MICROGram(s) Oral daily  metoprolol succinate ER 25 milliGRAM(s) Oral daily  pravastatin 40 milliGRAM(s) Oral at bedtime  sucralfate 1 Gram(s) Oral two times a day       Allergies  cinnamon (Other)  Drug Allergies Not Recorded      ROS: Pertinent positives in HPI, all other ROS were reviewed and are negative.      Vital Signs Last 24 Hrs  T(C): 36.4 (30 Oct 2022 07:24), Max: 37.2 (29 Oct 2022 20:25)  T(F): 97.6 (30 Oct 2022 07:24), Max: 98.9 (29 Oct 2022 20:25)  HR: 69 (30 Oct 2022 07:24) (69 - 87)  BP: 150/56 (30 Oct 2022 07:24) (119/88 - 150/56)  BP(mean): 95 (29 Oct 2022 20:25) (90 - 95)  RR: 17 (30 Oct 2022 07:24) (15 - 21)  SpO2: 98% (30 Oct 2022 07:24) (95% - 98%)    Parameters below as of 30 Oct 2022 07:24  Patient On (Oxygen Delivery Method): room air      Neurological exam:  HF: A x O x 3. Appropriately interactive, normal affect. Speech fluent, No Aphasia or paraphasic errors. Naming /repetition intact   CN: BRIDGETTE, EOMI, VFF, facial sensation normal, no NLFD, tongue midline, Palate moves equally, SCM equal bilaterally  Motor: +weakness 0/5 in right wrist extension, subtle weakness 4+/5 right brachioradialis, able to extend fingers on right, normal finger flexion, wrist flexion, and finger flexion.  No other focal weakness in LUE/LLE/RLE.      Sens: Intact to light touch / VS    Reflexes: Symmetric and normal . BJ 2+, BR 2+, KJ 2+, AJ 2+, downgoing toes b/l  Coord:  No FNFA, dysmetria, PAUL intact     NIHSS: 0    Labs:   10-30    136  |  104  |  24<H>  ----------------------------<  100<H>  4.2   |  27  |  0.65    Ca    8.9      30 Oct 2022 06:52    TPro  7.8  /  Alb  3.3  /  TBili  0.3  /  DBili  x   /  AST  18  /  ALT  23  /  AlkPhos  56  10-29                              9.9    4.69  )-----------( 230      ( 30 Oct 2022 06:52 )             32.0           A/P:   89 yo woman with an acute onset of a right wrist drop.  Differential Diagnosis includes stroke affecting right hand cortical representation, metastatic lesion, radial neuropathy, brachial plexopathy.  Questionable metastatic lesion in thoracic spine seen on chest Xray.    Recommend:  1. MRI brain pending  2. Questionable thoracic lesion on chest xray: MRI cervical and thoracic spine c/s gadolinium  3. Aspirin 81mg daily  4. Intensive statin therapy, target LDL < 70   5. Once patient is beyond 24 hours from symptom onset, can gradually optimize BP control  6. If MRI brain and spinal MRI are negative, recommend MRI right brachial plexus c/s gadolinium (risk for metastatic disease to lymph nodes)  7. Telemetry monitoring    Dino Khoury MD  Neurology Attending Physician

## 2022-10-30 NOTE — CONSULT NOTE ADULT - SUBJECTIVE AND OBJECTIVE BOX
CHIEF COMPLAINT:    HPI:  HPI: The patient is an 89-year-old female with hx of  MV repair . PAF  unsteady gait ,prior TIAs  fall prior ICH  no on anticoagulation , who was sent to the emergency room from [facility] because she developed right hand to finger paralysis.  The patient's symptoms started last night and she did not want to come to the ER last night waiting for her symptoms to improve in the morning.  After her symptoms did not improve it was referred to the emergency room. The patient was diagnosed with Covid 19 infection a month ago.    Patient  denies any chest pain or shortness of breath or dizziness , patient blood work showed elevated troponin  without uptrending ,   her blood pressure was normal          . The patient was recently diagnosed with right breast mass and biopsy was recommended and scheduled as an outpatient.  The patient is also complaining of a Baker's cyst in the right knee which she wants to be evaluated for by orthopedics.    PMHx: Mitral valve repair  PAF  off anticoagulation due to fall with ICHemorrhage more than one year ago , Recently diagnosed right breast mass,TIA ×4, rheumatoid arthritis, lumbar spinal stenosis, HLD, hypothyroidism,    PSHx: Mitral valve repair     Family Hx: Father  from kidney failure at age 99, patient mother had DJD    Social Hx.: not smoking, no alcohol, Moved into [facility] 6 months ago from [place].      Allergies    cinnamon (Other)  Drug Allergies Not Recorded    Intolerances            MEDICATIONS:  MEDICATIONS  (STANDING):  aspirin enteric coated 81 milliGRAM(s) Oral daily  calcium carbonate 1250 mG  + Vitamin D (OsCal 500 + D) 1 Tablet(s) Oral daily  heparin   Injectable 5000 Unit(s) SubCutaneous every 8 hours  levothyroxine 100 MICROGram(s) Oral daily  metoprolol succinate ER 25 milliGRAM(s) Oral daily  pravastatin 40 milliGRAM(s) Oral at bedtime  sucralfate 1 Gram(s) Oral two times a day    MEDICATIONS  (PRN):  acetaminophen     Tablet .. 650 milliGRAM(s) Oral every 6 hours PRN Temp greater or equal to 38C (100.4F), Mild Pain (1 - 3)  aluminum hydroxide/magnesium hydroxide/simethicone Suspension 30 milliLiter(s) Oral every 4 hours PRN Dyspepsia  artificial  tears Solution 1 Drop(s) Both EYES two times a day PRN Dry Eyes  melatonin 3 milliGRAM(s) Oral at bedtime PRN Insomnia  ondansetron Injectable 4 milliGRAM(s) IV Push every 8 hours PRN Nausea and/or Vomiting      REVIEW OF SYSTEMS:    CONSTITUTIONAL: No weakness, fevers or chills  EYES/ENT: No visual changes;  No vertigo or throat pain   NECK: No pain or stiffness  RESPIRATORY: No cough, wheezing, hemoptysis; No shortness of breath  CARDIOVASCULAR: No chest pain or palpitations  GASTROINTESTINAL: No abdominal or epigastric pain. No nausea, vomiting, or hematemesis; No diarrhea or constipation. No melena or hematochezia.  GENITOURINARY: No dysuria, frequency or hematuria  NEUROLOGICAL: unsteady gait   improved right hand weakness   SKIN: No itching, burning, rashes, or lesions   All other review of systems is negative unless indicated above    Vital Signs Last 24 Hrs  T(C): 36.4 (30 Oct 2022 07:24), Max: 37.2 (29 Oct 2022 20:25)  T(F): 97.6 (30 Oct 2022 07:24), Max: 98.9 (29 Oct 2022 20:25)  HR: 69 (30 Oct 2022 07:24) (69 - 87)  BP: 150/56 (30 Oct 2022 07:24) (119/88 - 150/56)  BP(mean): 95 (29 Oct 2022 20:25) (90 - 95)  RR: 17 (30 Oct 2022 07:24) (16 - 21)  SpO2: 98% (30 Oct 2022 07:24) (97% - 98%)    Parameters below as of 30 Oct 2022 07:24  Patient On (Oxygen Delivery Method): room air        I&O's Summary      PHYSICAL EXAM:    Constitutional: NAD, awake and alert, well-developed  HEENT: PERR, EOMI,  No oral cyananosis.  Neck:  supple,  No JVD  Respiratory: Breath sounds are clear bilaterally, No wheezing, rales or rhonchi  Cardiovascular: S1 and S2, regular rate and rhythm, PSM   Gastrointestinal: Bowel Sounds present, soft, nontender.   Extremities: No peripheral edema. No clubbing or cyanosis.  Vascular: 2+ peripheral pulses  Neurological: A/O x 3, unsteady gait   Musculoskeletal: arthritic changes , unsteady gait  no calf tenderness.  Skin: No rashes.      LABS: All Labs Reviewed:                        9.9    4.69  )-----------( 230      ( 30 Oct 2022 06:52 )             32.0                         10.6   6.39  )-----------( 281      ( 29 Oct 2022 13:23 )             32.5     30 Oct 2022 06:52    136    |  104    |  24     ----------------------------<  100    4.2     |  27     |  0.65   29 Oct 2022 13:23    135    |  104    |  27     ----------------------------<  80     4.3     |  26     |  0.67     Ca    8.9        30 Oct 2022 06:52  Ca    9.2        29 Oct 2022 13:23    TPro  7.8    /  Alb  3.3    /  TBili  0.3    /  DBili  x      /  AST  18     /  ALT  23     /  AlkPhos  56     29 Oct 2022 13:23    PT/INR - ( 29 Oct 2022 13:23 )   PT: 11.3 sec;   INR: 0.97 ratio         PTT - ( 29 Oct 2022 13:23 )  PTT:27.5 sec    - TroponinI hsT: <-245.03, <-256.37    Blood Culture:     10-30 @ 06:52  TSH: 2.33      RADIOLOGY/EKG:  < from: 12 Lead ECG (10.29.22 @ 14:07) >  inus rhythm with occasional Premature ventricular complexes  Non-specific intra-ventricular conduction block  Abnormal ECG  No previous ECGs available  Confirmed by MD ARJUN, SARAN ([id number]) on 10/30/2022 8:47:28 AM    < end of copied text >    Monitor sinus rhythm PVCS

## 2022-10-30 NOTE — PROGRESS NOTE ADULT - SUBJECTIVE AND OBJECTIVE BOX
HOSPITALIST ATTENDING PROGRESS NOTE    Chart and meds reviewed.  Patient seen and examined.    CC: R wrist paralysis    Subjective: Reports R wrist/hand weakness x 2 days. Started suddenly. No new leg pain, other focal neuro sx. Denies CP, SOB.    All other systems reviewed and found to be negative with the exception of what has been described above.    MEDICATIONS  (STANDING):  aspirin enteric coated 81 milliGRAM(s) Oral daily  calcium carbonate 1250 mG  + Vitamin D (OsCal 500 + D) 1 Tablet(s) Oral daily  heparin   Injectable 5000 Unit(s) SubCutaneous every 8 hours  levothyroxine 100 MICROGram(s) Oral daily  metoprolol succinate ER 25 milliGRAM(s) Oral daily  pravastatin 40 milliGRAM(s) Oral at bedtime  sucralfate 1 Gram(s) Oral two times a day    MEDICATIONS  (PRN):  acetaminophen     Tablet .. 650 milliGRAM(s) Oral every 6 hours PRN Temp greater or equal to 38C (100.4F), Mild Pain (1 - 3)  aluminum hydroxide/magnesium hydroxide/simethicone Suspension 30 milliLiter(s) Oral every 4 hours PRN Dyspepsia  artificial  tears Solution 1 Drop(s) Both EYES two times a day PRN Dry Eyes  melatonin 3 milliGRAM(s) Oral at bedtime PRN Insomnia  ondansetron Injectable 4 milliGRAM(s) IV Push every 8 hours PRN Nausea and/or Vomiting      VITALS:  T(F): 97.6 (10-30-22 @ 07:24), Max: 98.9 (10-29-22 @ 20:25)  HR: 69 (10-30-22 @ 07:24) (69 - 87)  BP: 150/56 (10-30-22 @ 07:24) (119/88 - 150/56)  RR: 17 (10-30-22 @ 07:24) (16 - 21)  SpO2: 98% (10-30-22 @ 07:24) (97% - 98%)  Wt(kg): --    I&O's Summary      CAPILLARY BLOOD GLUCOSE          PHYSICAL EXAM:  Gen: NAD  HEENT:  pupils equal and reactive, EOMI, no oropharyngeal lesions, erythema, exudates, oral thrush  NECK:   supple, no carotid bruits, no palpable lymph nodes, no thyromegaly  CV:  +S1, +S2, regular, no murmurs or rubs  RESP:   lungs clear to auscultation bilaterally, no wheezing, rales, rhonchi, good air entry bilaterally  BREAST:  not examined  GI:  abdomen soft, non-tender, non-distended, normal BS, no bruits, no abdominal masses, no palpable masses  RECTAL:  not examined  :  not examined  MSK:   normal muscle tone, no atrophy, no rigidity, no contractions  EXT:  no clubbing, no cyanosis, no edema, no calf pain, swelling or erythema  VASCULAR:  pulses equal and symmetric in the upper and lower extremities  NEURO:  AAOX3, R hand/wrist weakness, follows all commands, able to move extremities spontaneously  SKIN:  no ulcers, lesions or rashes    LABS:                            9.9    4.69  )-----------( 230      ( 30 Oct 2022 06:52 )             32.0     10    136  |  104  |  24<H>  ----------------------------<  100<H>  4.2   |  27  |  0.65    Ca    8.9      30 Oct 2022 06:52    TPro  7.8  /  Alb  3.3  /  TBili  0.3  /  DBili  x   /  AST  18  /  ALT  23  /  AlkPhos  56  10-29        LIVER FUNCTIONS - ( 29 Oct 2022 13:23 )  Alb: 3.3 g/dL / Pro: 7.8 gm/dL / ALK PHOS: 56 U/L / ALT: 23 U/L / AST: 18 U/L / GGT: x           PT/INR - ( 29 Oct 2022 13:23 )   PT: 11.3 sec;   INR: 0.97 ratio         PTT - ( 29 Oct 2022 13:23 )  PTT:27.5 sec  Urinalysis Basic - ( 29 Oct 2022 13:23 )    Color: Yellow / Appearance: Clear / S.010 / pH: x  Gluc: x / Ketone: Negative  / Bili: Negative / Urobili: Negative   Blood: x / Protein: Negative / Nitrite: Negative   Leuk Esterase: Negative / RBC: x / WBC x   Sq Epi: x / Non Sq Epi: x / Bacteria: x    CULTURES:  no new    Additional results/Imaging, I have personally reviewed:  CTH, CT cspine 10/29/22: CT HEAD: No acute intracranial hemorrhage or mass affect. Chronic small vessel ischemic changes and lacunar infarcts. CT CERVICAL SPINE: No acute cervical spine fracture or traumatic malalignment. Multilevel cervical spondylosis.    CXR, R hand xray 10/29/22: Question blastic metastatic disease of a lower thoracic body. No acute lung finding. No fracture of the right hand.    Telemetry, personally reviewed:  10/30/22: sinus 60-80, PVCs, couplets

## 2022-10-30 NOTE — DISCHARGE NOTE NURSING/CASE MANAGEMENT/SOCIAL WORK - PATIENT PORTAL LINK FT
You can access the FollowMyHealth Patient Portal offered by [facility] by registering at the following website: [email or web address]. By joining [employer]’s FollowMyHealth portal, you will also be able to view your health information using other applications (apps) compatible with our system.

## 2022-10-30 NOTE — CONSULT NOTE ADULT - PROBLEM SELECTOR RECOMMENDATION 4
patient with above hx , had fallen few times with ICH year ago  , was taken off AC by her cardiologist   patient currently in sinus rhythm , no afib episodes , if her MRI showed evidence of acute infarct will reassess for restarting her on AC    continue metoprolol

## 2022-10-30 NOTE — CONSULT NOTE ADULT - PROBLEM SELECTOR RECOMMENDATION 2
without chest pain , with hypertensive heart disease IVCD pVC  possible demand ischemia underlying hypertensive heart disease , will obtain echocardiogram to assess ventricular function , repeat troponin , ekg  continue BB , statin , ecotrin     will need ischemic work up

## 2022-10-30 NOTE — DISCHARGE NOTE NURSING/CASE MANAGEMENT/SOCIAL WORK - NSDCPEFALRISK_GEN_ALL_CORE
For information on Fall & Injury Prevention, visit: [email or web address] OR  [email or web address] OR  https://www.cdc.gov/steadi/patient.html

## 2022-10-30 NOTE — PROGRESS NOTE ADULT - ASSESSMENT
89F hx MV repair, pAF, unsteady gait, 4 prior TIAs, fall prior w ICH, not on full a/c, R  breast mass, RA, lumbar spinal stenosis, HLD, hypothyroid, p/w R wrist weakness.    #r/o CVA  -tele  -A1c, lipids  -TSH wnl  -CTH neg  -MRI brain, MRA H/N ordered  -per neuro, if MRI brain and spine neg, obtain MRI R brachial plexus w [place] to eval for dz in LN  -ASA, statin  -does have hx Afib, if evidence CVA, may need full a/c  -passed dysphagia screen  -f/u neuro recs  -PT eval    #?thoracic spine lesion on CXR, has breast mass (not pursuing bx)  -MRI C,T, L spine to further eval     #Elevated trop, ?demand ischemia  -tele  -trops downtrended  -A1c, lipids  -echo ordered  -?plan for ischemic w/u, f/u cards recs  -ASA, statin, BB  -f/u cards recs    #afib  -BB  -not on full a/c    #DVT ppx- SQH    #from Jason

## 2022-10-31 LAB
ANION GAP SERPL CALC-SCNC: 6 MMOL/L — SIGNIFICANT CHANGE UP (ref 5–17)
BUN SERPL-MCNC: 22 MG/DL — SIGNIFICANT CHANGE UP (ref 7–23)
CALCIUM SERPL-MCNC: 8.7 MG/DL — SIGNIFICANT CHANGE UP (ref 8.5–10.1)
CHLORIDE SERPL-SCNC: 106 MMOL/L — SIGNIFICANT CHANGE UP (ref 96–108)
CO2 SERPL-SCNC: 26 MMOL/L — SIGNIFICANT CHANGE UP (ref 22–31)
CREAT SERPL-MCNC: 0.6 MG/DL — SIGNIFICANT CHANGE UP (ref 0.5–1.3)
EGFR: 86 ML/MIN/1.73M2 — SIGNIFICANT CHANGE UP
ERYTHROCYTE [SEDIMENTATION RATE] IN BLOOD: 47 MM/HR — HIGH (ref 0–20)
GLUCOSE SERPL-MCNC: 93 MG/DL — SIGNIFICANT CHANGE UP (ref 70–99)
HCT VFR BLD CALC: 30.5 % — LOW (ref 34.5–45)
HGB BLD-MCNC: 9.8 G/DL — LOW (ref 11.5–15.5)
HIV 1 & 2 AB SERPL IA.RAPID: SIGNIFICANT CHANGE UP
MCHC RBC-ENTMCNC: 29.3 PG — SIGNIFICANT CHANGE UP (ref 27–34)
MCHC RBC-ENTMCNC: 32.1 GM/DL — SIGNIFICANT CHANGE UP (ref 32–36)
MCV RBC AUTO: 91 FL — SIGNIFICANT CHANGE UP (ref 80–100)
PLATELET # BLD AUTO: 245 K/UL — SIGNIFICANT CHANGE UP (ref 150–400)
POTASSIUM SERPL-MCNC: 3.9 MMOL/L — SIGNIFICANT CHANGE UP (ref 3.5–5.3)
POTASSIUM SERPL-SCNC: 3.9 MMOL/L — SIGNIFICANT CHANGE UP (ref 3.5–5.3)
RBC # BLD: 3.35 M/UL — LOW (ref 3.8–5.2)
RBC # FLD: 12.4 % — SIGNIFICANT CHANGE UP (ref 10.3–14.5)
SODIUM SERPL-SCNC: 138 MMOL/L — SIGNIFICANT CHANGE UP (ref 135–145)
WBC # BLD: 5.33 K/UL — SIGNIFICANT CHANGE UP (ref 3.8–10.5)
WBC # FLD AUTO: 5.33 K/UL — SIGNIFICANT CHANGE UP (ref 3.8–10.5)

## 2022-10-31 PROCEDURE — 99233 SBSQ HOSP IP/OBS HIGH 50: CPT

## 2022-10-31 PROCEDURE — 70547 MR ANGIOGRAPHY NECK W/O DYE: CPT | Mod: 26

## 2022-10-31 PROCEDURE — 72156 MRI NECK SPINE W/O & W/DYE: CPT | Mod: 26

## 2022-10-31 PROCEDURE — 72158 MRI LUMBAR SPINE W/O & W/DYE: CPT | Mod: 26

## 2022-10-31 PROCEDURE — 70553 MRI BRAIN STEM W/O & W/DYE: CPT | Mod: 26

## 2022-10-31 PROCEDURE — 99232 SBSQ HOSP IP/OBS MODERATE 35: CPT

## 2022-10-31 PROCEDURE — 72157 MRI CHEST SPINE W/O & W/DYE: CPT | Mod: 26

## 2022-10-31 PROCEDURE — 70544 MR ANGIOGRAPHY HEAD W/O DYE: CPT | Mod: 26,59

## 2022-10-31 RX ORDER — MULTIVIT-MIN/FERROUS GLUCONATE 9 MG/15 ML
1 LIQUID (ML) ORAL DAILY
Refills: 0 | Status: DISCONTINUED | OUTPATIENT
Start: 2022-10-31 | End: 2022-11-04

## 2022-10-31 RX ADMIN — Medication 81 MILLIGRAM(S): at 12:39

## 2022-10-31 RX ADMIN — Medication 1 TABLET(S): at 15:12

## 2022-10-31 RX ADMIN — HEPARIN SODIUM 5000 UNIT(S): 5000 INJECTION INTRAVENOUS; SUBCUTANEOUS at 21:26

## 2022-10-31 RX ADMIN — Medication 1 GRAM(S): at 12:39

## 2022-10-31 RX ADMIN — Medication 25 MILLIGRAM(S): at 12:40

## 2022-10-31 RX ADMIN — Medication 100 MICROGRAM(S): at 06:37

## 2022-10-31 RX ADMIN — Medication 1 TABLET(S): at 12:39

## 2022-10-31 RX ADMIN — HEPARIN SODIUM 5000 UNIT(S): 5000 INJECTION INTRAVENOUS; SUBCUTANEOUS at 13:00

## 2022-10-31 RX ADMIN — Medication 1 GRAM(S): at 21:26

## 2022-10-31 RX ADMIN — Medication 40 MILLIGRAM(S): at 21:25

## 2022-10-31 RX ADMIN — HEPARIN SODIUM 5000 UNIT(S): 5000 INJECTION INTRAVENOUS; SUBCUTANEOUS at 05:07

## 2022-10-31 NOTE — DIETITIAN INITIAL EVALUATION ADULT - PERTINENT LABORATORY DATA
10-31    138  |  106  |  22  ----------------------------<  93  3.9   |  26  |  0.60    Ca    8.7      31 Oct 2022 06:55    TPro  7.8  /  Alb  3.3  /  TBili  0.3  /  DBili  x   /  AST  18  /  ALT  23  /  AlkPhos  56  10-29  A1C with Estimated Average Glucose Result: 5.2 % (10-30-22 @ 06:52)

## 2022-10-31 NOTE — PHYSICAL THERAPY INITIAL EVALUATION ADULT - GENERAL OBSERVATIONS, REHAB EVAL
Pt was found lying in bed with tele on, Pt is c/o Rt hand weakness, MRI pending, Pt is pleasant and willing to participate in PT

## 2022-10-31 NOTE — PROGRESS NOTE ADULT - SUBJECTIVE AND OBJECTIVE BOX
Interval History:  10/31/22: No new complaints    MEDICATIONS  (STANDING):  aspirin enteric coated 81 milliGRAM(s) Oral daily  calcium carbonate 1250 mG  + Vitamin D (OsCal 500 + D) 1 Tablet(s) Oral daily  heparin   Injectable 5000 Unit(s) SubCutaneous every 8 hours  levothyroxine 100 MICROGram(s) Oral daily  metoprolol succinate ER 25 milliGRAM(s) Oral daily  pravastatin 40 milliGRAM(s) Oral at bedtime  sucralfate 1 Gram(s) Oral two times a day    MEDICATIONS  (PRN):  acetaminophen     Tablet .. 650 milliGRAM(s) Oral every 6 hours PRN Temp greater or equal to 38C (100.4F), Mild Pain (1 - 3)  aluminum hydroxide/magnesium hydroxide/simethicone Suspension 30 milliLiter(s) Oral every 4 hours PRN Dyspepsia  artificial  tears Solution 1 Drop(s) Both EYES two times a day PRN Dry Eyes  melatonin 3 milliGRAM(s) Oral at bedtime PRN Insomnia  ondansetron Injectable 4 milliGRAM(s) IV Push every 8 hours PRN Nausea and/or Vomiting      Allergies    aspirin (Unknown)  cinnamon (Other)  Flomax (Unknown)  tramadol (Unknown)    Intolerances        PHYSICAL EXAM:  Vital Signs Last 24 Hrs  T(F): 97.5 (10-31-22 @ 08:25)  HR: 67 (10-31-22 @ 08:25)  BP: 132/60 (10-31-22 @ 08:25)  RR: 18 (10-31-22 @ 08:25)    HF: A x O x 3. Appropriately interactive, normal affect. Speech fluent, No Aphasia or paraphasic errors. Naming /repetition intact   CN: BRIDGETTE, EOMI, VFF, facial sensation normal, no NLFD, tongue midline, Palate moves equally, SCM equal bilaterally  Motor: +weakness 0/5 in right wrist extension, subtle weakness 4+/5 right brachioradialis, able to extend fingers on right, normal finger flexion, wrist flexion, and finger flexion. Decreased  on right. Full strength in LUE. No focal weakness in lower extremities.    Sens: Intact to light touch / VS    Reflexes: Symmetric and normal . BJ 1+, BR 1+, KJ 1+, AJ 1+, downgoing toes b/l  Coord:  No FNFA, dysmetria, PAUL intact     LABS:                        9.8    5.33  )-----------( 245      ( 31 Oct 2022 06:55 )             30.5     10-31    138  |  106  |  22  ----------------------------<  93  3.9   |  26  |  0.60    Ca    8.7      31 Oct 2022 06:55    TPro  7.8  /  Alb  3.3  /  TBili  0.3  /  DBili  x   /  AST  18  /  ALT  23  /  AlkPhos  56  10    PT/INR - ( 29 Oct 2022 13:23 )   PT: 11.3 sec;   INR: 0.97 ratio         PTT - ( 29 Oct 2022 13:23 )  PTT:27.5 sec  Urinalysis Basic - ( 29 Oct 2022 13:23 )    Color: Yellow / Appearance: Clear / S.010 / pH: x  Gluc: x / Ketone: Negative  / Bili: Negative / Urobili: Negative   Blood: x / Protein: Negative / Nitrite: Negative   Leuk Esterase: Negative / RBC: x / WBC x   Sq Epi: x / Non Sq Epi: x / Bacteria: x        RADIOLOGY & ADDITIONAL STUDIES:  CT head and cervical spine 10/31/22:  CT HEAD: No acute intracranial hemorrhage or mass affect. Chronic small   vessel ischemic changes and lacunar infarcts.    CT CERVICAL SPINE: No acute cervical spine fracture or traumatic   malalignment. Multilevel cervical spondylosis.

## 2022-10-31 NOTE — PROGRESS NOTE ADULT - PROBLEM SELECTOR PLAN 1
possible CVA vs TIA , with severe osteoarthritic changes of hands   patient had prior hx TIA . traumatic ICB while AC with falls , was taken off  AC more than year ago   MRI brain pending to r/o stroke  If evidence of CVA will reassess restarting oral AC possible CVA vs TIA , with severe osteoarthritic changes of hands   patient had prior hx TIA . traumatic ICB while AC with falls , was taken off  AC more than year ago   MRI brain pending to r/o stroke; per Neurology There is a question of a metastatic lesion in thoracic spine seen on chest Xray.  MRI brain pending to r/o stroke. Will include contrast given question of metastatic disease seen on x-ray   If evidence of CVA will reassess restarting oral AC

## 2022-10-31 NOTE — PROGRESS NOTE ADULT - ASSESSMENT
Ms. Alvarado is an 88 yo woman with an acute onset of a right wrist drop.  She reports that symptoms started suddenly while she was awake.  The differential diagnosis includes stroke affecting right hand cortical representation, metastatic lesion, radial neuropathy, brachial plexopathy.  There is a question of a metastatic lesion in thoracic spine seen on chest Xray.    - MRI brain pending to r/o stroke. Will include contrast given question of metastatic disease seen on xray.  - Questionable thoracic lesion on chest xray: MRI cervical and thoracic spine c/s gadolinium  - Continue aspirin 81mg daily  - Statin therapy, target LDL < 70   - Symptoms onset was over 24 hours ago, can optimize BP control.  - If MRI brain and spinal MRI are negative, can get MRI right brachial plexus c/s gadolinium (risk for metastatic disease to lymph nodes)  - Continue monitoring on telemetry  - DVT prophylaxis

## 2022-10-31 NOTE — PROGRESS NOTE ADULT - SUBJECTIVE AND OBJECTIVE BOX
CHIEF COMPLAINT:    HPI:  HPI: The patient is an 89-year-old female with hx of  MV repair . PAF  unsteady gait ,prior TIAs  fall prior ICH  no on anticoagulation , who was sent to the emergency room from [facility] because she developed right hand to finger paralysis.  The patient's symptoms started last night and she did not want to come to the ER last night waiting for her symptoms to improve in the morning.  After her symptoms did not improve it was referred to the emergency room. The patient was diagnosed with Covid 19 infection a month ago.    Patient  denies any chest pain or shortness of breath or dizziness , patient blood work showed elevated troponin  without uptrending ,   her blood pressure was normal          . The patient was recently diagnosed with right breast mass and biopsy was recommended and scheduled as an outpatient.  The patient is also complaining of a Baker's cyst in the right knee which she wants to be evaluated for by orthopedics.    PMHx: Mitral valve repair  PAF  off anticoagulation due to fall with ICH hemorrhage more than one year ago , Recently diagnosed right breast mass, TIA ×4, rheumatoid arthritis, lumbar spinal stenosis, HLD, hypothyroidism,    PSHx: Mitral valve repair     Family Hx: Father  from kidney failure at age 99, patient mother had DJD    Social Hx.: not smoking, no alcohol, Moved into [facility] 6 months ago from [place].    10/31/22 awake alert feeling well Tele SR     Allergies    cinnamon (Other)  Drug Allergies Not Recorded    Intolerances            MEDICATIONS  (STANDING):  aspirin enteric coated 81 milliGRAM(s) Oral daily  calcium carbonate 1250 mG  + Vitamin D (OsCal 500 + D) 1 Tablet(s) Oral daily  heparin   Injectable 5000 Unit(s) SubCutaneous every 8 hours  levothyroxine 100 MICROGram(s) Oral daily  metoprolol succinate ER 25 milliGRAM(s) Oral daily  pravastatin 40 milliGRAM(s) Oral at bedtime  sucralfate 1 Gram(s) Oral two times a day    MEDICATIONS  (PRN):  acetaminophen     Tablet .. 650 milliGRAM(s) Oral every 6 hours PRN Temp greater or equal to 38C (100.4F), Mild Pain (1 - 3)  aluminum hydroxide/magnesium hydroxide/simethicone Suspension 30 milliLiter(s) Oral every 4 hours PRN Dyspepsia  artificial  tears Solution 1 Drop(s) Both EYES two times a day PRN Dry Eyes  melatonin 3 milliGRAM(s) Oral at bedtime PRN Insomnia  ondansetron Injectable 4 milliGRAM(s) IV Push every 8 hours PRN Nausea and/or Vomiting      Vital Signs Last 24 Hrs  T(C): 36.4 (31 Oct 2022 08:25), Max: 36.6 (31 Oct 2022 04:18)  T(F): 97.5 (31 Oct 2022 08:25), Max: 97.8 (31 Oct 2022 04:18)  HR: 67 (31 Oct 2022 08:25) (67 - 76)  BP: 132/60 (31 Oct 2022 08:25) (132/60 - 154/73)  BP(mean): --  RR: 18 (31 Oct 2022 08:25) (17 - 18)  SpO2: 98% (31 Oct 2022 08:25) (93% - 98%)    Parameters below as of 31 Oct 2022 08:25  Patient On (Oxygen Delivery Method): room air            I&O's Summary      PHYSICAL EXAM:    Constitutional: NAD, awake and alert, well-developed  HEENT: PERR, EOMI,  No oral cyananosis.  Neck:  supple,  No JVD  Respiratory: Breath sounds are clear bilaterally  Cardiovascular: S1 and S2, RRR + Sys Murmur   Gastrointestinal: Abd soft, nontender.   Extremities: No LE Edema  Neurological: A/O x 3, unsteady gait   Musculoskeletal: arthritic changes , unsteady gait  no calf tenderness.  Skin: No rashes.      LABS: All Labs Reviewed:                        9.9    4.69  )-----------( 230      ( 30 Oct 2022 06:52 )             32.0                         10.6   6.39  )-----------( 281      ( 29 Oct 2022 13:23 )             32.5     30 Oct 2022 06:52    136    |  104    |  24     ----------------------------<  100    4.2     |  27     |  0.65   29 Oct 2022 13:23    135    |  104    |  27     ----------------------------<  80     4.3     |  26     |  0.67     Ca    8.9        30 Oct 2022 06:52  Ca    9.2        29 Oct 2022 13:23    TPro  7.8    /  Alb  3.3    /  TBili  0.3    /  DBili  x      /  AST  18     /  ALT  23     /  AlkPhos  56     29 Oct 2022 13:23    PT/INR - ( 29 Oct 2022 13:23 )   PT: 11.3 sec;   INR: 0.97 ratio         PTT - ( 29 Oct 2022 13:23 )  PTT:27.5 sec    - TroponinI hsT: <-245.03, <-256.37    Blood Culture:     10-30 @ 06:52  TSH: 2.33      RADIOLOGY/EKG:  < from: 12 Lead ECG (10.29.22 @ 14:07) >  inus rhythm with occasional Premature ventricular complexes  Non-specific intra-ventricular conduction block  Abnormal ECG  No previous ECGs available  Confirmed by MD ARJUN, SARAN ([id number]) on 10/30/2022 8:47:28 AM    < end of copied text >    Monitor sinus rhythm PVCS

## 2022-10-31 NOTE — DIETITIAN INITIAL EVALUATION ADULT - PERTINENT MEDS FT
MEDICATIONS  (STANDING):  aspirin enteric coated 81 milliGRAM(s) Oral daily  calcium carbonate 1250 mG  + Vitamin D (OsCal 500 + D) 1 Tablet(s) Oral daily  heparin   Injectable 5000 Unit(s) SubCutaneous every 8 hours  levothyroxine 100 MICROGram(s) Oral daily  metoprolol succinate ER 25 milliGRAM(s) Oral daily  pravastatin 40 milliGRAM(s) Oral at bedtime  sucralfate 1 Gram(s) Oral two times a day    MEDICATIONS  (PRN):  acetaminophen     Tablet .. 650 milliGRAM(s) Oral every 6 hours PRN Temp greater or equal to 38C (100.4F), Mild Pain (1 - 3)  aluminum hydroxide/magnesium hydroxide/simethicone Suspension 30 milliLiter(s) Oral every 4 hours PRN Dyspepsia  artificial  tears Solution 1 Drop(s) Both EYES two times a day PRN Dry Eyes  melatonin 3 milliGRAM(s) Oral at bedtime PRN Insomnia  ondansetron Injectable 4 milliGRAM(s) IV Push every 8 hours PRN Nausea and/or Vomiting

## 2022-10-31 NOTE — PHYSICAL THERAPY INITIAL EVALUATION ADULT - PERTINENT HX OF CURRENT PROBLEM, REHAB EVAL
Pt presents with an acute onset of a right wrist drop. She reports that symptoms started suddenly while she was awake. was sent to the emergency room from [facility] because she developed right hand to finger paralysis. The patient was diagnosed with Covid 19 infection a month ago. The patient was recently diagnosed with right breast mass and biopsy was recommended and scheduled as an outpatient.  The patient is also complaining of a Baker's cyst in the right knee

## 2022-10-31 NOTE — PROGRESS NOTE ADULT - ASSESSMENT
89F hx MV repair, pAF, unsteady gait, 4 prior TIAs, fall prior w [facility], not on full a/c, R  breast mass, RA, lumbar spinal stenosis, HLD, hypothyroid, p/w R wrist weakness.    #r/o CVA  -tele  -A1c 5.2, lipids checked  -TSH wnl  -CTH neg  -MRI brain, MRA H/N neg for CVA, mets, vascular abnlities  -per neuro, if MRI brain and spine neg, obtain MRI R brachial plexus w [place] to eval for dz in LN  -ASA, statin  -passed dysphagia screen  -f/u neuro recs  -PT eval    #?thoracic spine lesion on CXR, has breast mass (not pursuing bx)  -no osseous met seen there on MRI t spine    #rim enhancing lesion at C6 on CT c spine  -?abscess  -f/u ortho spine recs    #Elevated trop, ?demand ischemia  -tele  -trops downtrended  -A1c, 5.2, lipids checked  -echo ordered  -?plan for ischemic w/u, f/u cards recs, likely inpt stress  -ASA, statin, BB  -f/u cards recs    #afib  -BB  -not on full a/c    #DVT ppx- SQH    #from [facility]    Son updated 10/30 and 10/31

## 2022-10-31 NOTE — DIETITIAN INITIAL EVALUATION ADULT - OTHER INFO
The patient is an 89-year-old female who was sent to the emergency room from [facility] because she developed right hand to finger paralysis.  The patient's symptoms started last night and she did not want to come to the ER last night waiting for her symptoms to improve in the morning.  After her symptoms did not improve it was referred to the emergency room. The patient was diagnosed with Covid 19 infection a month ago. The patient was recently diagnosed with right breast mass and biopsy was recommended and scheduled as an outpatient.  The patient is also complaining of a Baker's cyst in the right knee which she wants to be evaluated for by orthopedics.  Admit for  right wrist paralysis r/o CVA, R breast mass  The patient is an 89-year-old female who was sent to the emergency room from [facility] because she developed right hand to finger paralysis.  The patient's symptoms started last night and she did not want to come to the ER last night waiting for her symptoms to improve in the morning.  After her symptoms did not improve it was referred to the emergency room. The patient was diagnosed with Covid 19 infection a month ago. The patient was recently diagnosed with right breast mass and biopsy was recommended and scheduled as an outpatient.  The patient is also complaining of a Baker's cyst in the right knee which she wants to be evaluated for by orthopedics.  Admit for right wrist paralysis r/o CVA - possible CVA vs TIA , with severe osteoarthritic changes of hands, R breast mass   Pt reports decreased appetite & consuming 50-60% of ENN 2/2 paralysis of wrist. Pt went for MRI today on 10/31/22- results pending to r/o stroke. Metastatic disease seen on xray - at risk for metastatic disease to lymph node. Reports UBW of 127# x 1 mo ago; pt reports that she probably "gained weight."  Unable to obtain bed scale wt 2/2 pt sitting in chair. Admit wt of 119.7# doc'd on 10/29/22. Unintentional wt loss of 7.3# / 6.1% wt loss x 1 mo; severe & clinically significant. NFPE reveals moderate-severe muscle/ fat wasting - appears overwt; edema could be masking further losses. Liberalize diet to regular to maximize caloric and nutrient intake. Add ensure + HP shake 1x/day - pt is receptive. See below for other recommendations.

## 2022-10-31 NOTE — PHYSICAL THERAPY INITIAL EVALUATION ADULT - PHYSICAL ASSIST/NONPHYSICAL ASSIST: SIT/STAND, REHAB EVAL
Patient: Atif Shah Date of Service: 2021   : 1936 MRN: [id number]     SUBJECTIVE:   HISTORY OF PRESENT ILLNESS:  Atif Shah is a 85 year old male who presents today for ear flushing.    Wax  - both ears  - hearing decreased in R  - desires flushing  - last done in 2021        PAST MEDICAL HISTORY:  History reviewed. No pertinent past medical history.    MEDICATIONS:  Current Outpatient Medications   Medication Sig   • docusate sodium-sennosides (SENOKOT S) 50-8.6 MG per tablet Take 1 tablet by mouth 2 times daily.   • glycerin, adult, 2 g suppository Place 1 suppository rectally as needed for Constipation.     No current facility-administered medications for this visit.       ALLERGIES:  No Known Allergies    PAST SURGICAL HISTORY:  History reviewed. No pertinent surgical history.    FAMILY HISTORY:  History reviewed. No pertinent family history.    SOCIAL HISTORY:  Social History     Tobacco Use   • Smoking status: Never Smoker   • Smokeless tobacco: Never Used   Vaping Use   • Vaping Use: never used   Substance Use Topics   • Alcohol use: Not Currently   • Drug use: Never       Review of Systems   Constitutional: Negative for fever.   HENT: Negative for nosebleeds.    Eyes: Negative for discharge.   Respiratory: Negative for cough, wheezing and stridor.    Cardiovascular: Negative for orthopnea.   Gastrointestinal: Negative for blood in stool, diarrhea and vomiting.   Genitourinary: Negative for hematuria.   Musculoskeletal: Negative for falls.   Skin: Negative for rash.   Neurological: Negative for tremors and seizures.   Endo/Heme/Allergies: Does not bruise/bleed easily.   All other systems reviewed and are negative.        OBJECTIVE:     Visit Vitals  /68 (BP Location: RUE - Right upper extremity, Patient Position: Sitting, Cuff Size: Regular)   Pulse 80   Temp 97 °F (36.1 °C) (Temporal)   Wt 68.5 kg (151 lb)   SpO2 99%   BMI 21.06 kg/m²       Physical Exam  Vitals reviewed.    Constitutional:       General: He is not in acute distress.     Appearance: Normal appearance. He is not ill-appearing, toxic-appearing or diaphoretic.   HENT:      Head: Normocephalic and atraumatic.      Right Ear: External ear normal. There is impacted cerumen.      Left Ear: External ear normal. There is impacted cerumen.      Ears:      Comments: S/p flushing, canals and TM's were WNL bilaterally.     Nose: Nose normal.   Eyes:      General: No scleral icterus.        Right eye: No discharge.         Left eye: No discharge.      Extraocular Movements: Extraocular movements intact.      Conjunctiva/sclera: Conjunctivae normal.   Pulmonary:      Effort: Pulmonary effort is normal.   Musculoskeletal:         General: No tenderness. Normal range of motion.      Cervical back: Normal range of motion and neck supple.   Skin:     General: Skin is warm and dry.      Coloration: Skin is not jaundiced.      Findings: No lesion or rash.   Neurological:      General: No focal deficit present.      Mental Status: He is alert. Mental status is at baseline.      Cranial Nerves: No cranial nerve deficit.      Gait: Gait is intact. Gait normal.      Deep Tendon Reflexes: Reflexes are normal and symmetric.   Psychiatric:         Mood and Affect: Mood and affect normal.         Behavior: Behavior normal.         Cognition and Memory: Memory normal.         Timeout  Physician verified correct patient.   Physician verified correct procedure.   Physician verified completed informed consent reviewed and accurate.   Physician verified pertinent /relevant medical record detail reviewed.   Physician verified site and procedure with either patient or family if patient cannot provide confirmation.   Physician verified correct positioning of the patient.   Physician verified immediate availability of all necessary medication.   Physician verified immediate availability of necessary equipment.   Physician verified sterility or high level  disinfection of instruments/equipment prior to procedure.     Procedure - Cerumen Removal  Indication: tympanic membrane(s) could not be visualized as there was complete cerumen impaction in both ear(s).   Provider(s) performing procedure: Dr. Leger.  Consent: Verbal was obtained from either patient or guardian.  Preparation: warm water with hydrogen peroxide  Details of procedure: Otoscope magnification was used in the ear canal(s) to visualize cerumen and debris. The ear was cleaned by using a loop curette and warm water irrigation. The procedure was successful.  Patient Status: The patient tolerated the procedure well.  Complications: None.  Patient instructions: Discussed dry ear precautions and avoiding using q-tips. Also use ear wax removal drops as directed. Follow-up as needed.        Assessment AND PLAN:     Problem List Items Addressed This Visit        ENT    Bilateral impacted cerumen - Primary     Successfully flushed both ears today.             Please take medications as directed.    Instructions provided as documented in the AVS.    The patient indicated understanding of the diagnosis and agreed with the plan of care.    Return if symptoms worsen or fail to improve.   supervision/verbal cues/1 person assist

## 2022-10-31 NOTE — PROGRESS NOTE ADULT - SUBJECTIVE AND OBJECTIVE BOX
HOSPITALIST ATTENDING PROGRESS NOTE    Chart and meds reviewed.  Patient seen and examined.    CC: R wrist weakness    Subjective: Continued R hand weakness, denies CP, SOB. Updated son.     All other systems reviewed and found to be negative with the exception of what has been described above.    MEDICATIONS  (STANDING):  aspirin enteric coated 81 milliGRAM(s) Oral daily  calcium carbonate 1250 mG  + Vitamin D (OsCal 500 + D) 1 Tablet(s) Oral daily  heparin   Injectable 5000 Unit(s) SubCutaneous every 8 hours  levothyroxine 100 MICROGram(s) Oral daily  metoprolol succinate ER 25 milliGRAM(s) Oral daily  multivitamin/minerals 1 Tablet(s) Oral daily  pravastatin 40 milliGRAM(s) Oral at bedtime  sucralfate 1 Gram(s) Oral two times a day    MEDICATIONS  (PRN):  acetaminophen     Tablet .. 650 milliGRAM(s) Oral every 6 hours PRN Temp greater or equal to 38C (100.4F), Mild Pain (1 - 3)  aluminum hydroxide/magnesium hydroxide/simethicone Suspension 30 milliLiter(s) Oral every 4 hours PRN Dyspepsia  artificial  tears Solution 1 Drop(s) Both EYES two times a day PRN Dry Eyes  melatonin 3 milliGRAM(s) Oral at bedtime PRN Insomnia  ondansetron Injectable 4 milliGRAM(s) IV Push every 8 hours PRN Nausea and/or Vomiting      VITALS:  T(F): 98.2 (10-31-22 @ 20:12), Max: 98.2 (10-31-22 @ 20:12)  HR: 75 (10-31-22 @ 20:12) (67 - 75)  BP: 128/50 (10-31-22 @ 20:12) (128/50 - 154/73)  RR: 18 (10-31-22 @ 20:12) (17 - 18)  SpO2: 96% (10-31-22 @ 20:12) (94% - 98%)  Wt(kg): --    I&O's Summary      CAPILLARY BLOOD GLUCOSE          PHYSICAL EXAM:  Gen: NAD  HEENT:  pupils equal and reactive, EOMI, no oropharyngeal lesions, erythema, exudates, oral thrush  NECK:   supple, no carotid bruits, no palpable lymph nodes, no thyromegaly  CV:  +S1, +S2, regular, no murmurs or rubs  RESP:   lungs clear to auscultation bilaterally, no wheezing, rales, rhonchi, good air entry bilaterally  BREAST:  not examined  GI:  abdomen soft, non-tender, non-distended, normal BS, no bruits, no abdominal masses, no palpable masses  RECTAL:  not examined  :  not examined  MSK:   normal muscle tone, no atrophy, no rigidity, no contractions  EXT:  no clubbing, no cyanosis, no edema, no calf pain, swelling or erythema  VASCULAR:  pulses equal and symmetric in the upper and lower extremities  NEURO:  AAOX3, + R hand/wrist weakness follows all commands, able to move extremities spontaneously  SKIN:  no ulcers, lesions or rashes    LABS:                            9.8    5.33  )-----------( 245      ( 31 Oct 2022 06:55 )             30.5     10-31    138  |  106  |  22  ----------------------------<  93  3.9   |  26  |  0.60    Ca    8.7      31 Oct 2022 06:55    CULTURES:    no new    Additional results/Imaging, I have personally reviewed:  CTH, CT cspine 10/29/22: CT HEAD: No acute intracranial hemorrhage or mass affect. Chronic small vessel ischemic changes and lacunar infarcts. CT CERVICAL SPINE: No acute cervical spine fracture or traumatic malalignment. Multilevel cervical spondylosis.    CXR, R hand xray 10/29/22: Question blastic metastatic disease of a lower thoracic body. No acute lung finding. No fracture of the right hand.    MRI head w/wo contrast, MRA H/N 10/31/22: 1.  RIGHT CAROTID NECK CIRCULATION:   Intact. 2.  LEFT CAROTID NECK CIRCULATION:    Intact. 3.  VERTEBRAL NECK CIRCULATION:   Intact 4.  ANTERIOR INTRACRANIAL CIRCULATION:     Intracranial atherosclerosis cavernous carotid segments of the internal carotid arteries, while. 5.  POSTERIOR INTRACRANIAL CIRCULATION:   Intact. 6.  BRAIN:    No evidence of acute infarction. Bilateral cerebellar remote infarctions. Ischemic white matter disease lower range typical for age. Atrophytypical for age. Small pericallosal lipoma.    MR C, T, L spine 10/31/22: IMPRESSION: No evidence of osseous metastases 1.   Cervical spine:   Small rim enhancing collection posterior to the C6 spinous process measuring 1.4 x 1.6 x 1 cm question possiblesoft tissue lesion or abscess.   No evidence of osseous metastases. Mild disc degeneration at C6/7 with loss of disc height and signals nucleus pulposus. Mild narrowing of the RIGHT C4-5, moderate narrowing of the RIGHT C5-6 and marked narrowing of the BILATERAL C6-7 neural foramina due to uncovertebral spurring and facet osteophytic hypertrophy. 2.   Thoracic spine:   focal kyphosis at T8-9.  Thoracic vertebral body heights are significant for chronic compression deformities of T8-T9 with loss of 50 and 80% of vertebral body height and evidence of prior kyphoplasty.   Tiny RIGHT paracentral disc herniation at T9-T10. 3.   Lumbar spine:   Grade 1 posterior spondylolisthesis at L2-3 and grade 1 anterior spondylolisthesis at L4-5 on a degenerative basis. Grade 1 posterior spondylolisthesis at L5-S1 appears to be due to BILATERAL L5 spondylolysis.   Moderate to severe degenerative disc disease and spondylosis at L2-3 through L5-S1 with loss of disc height and associated degenerative endplate changes. Disc bulges at L2-3 through L5-S1 flatten the ventral thecal sac and narrow the BILATERAL neural foramina. Moderate to severe central stenosis at L3-4 and L4-5 on a degenerative basis due to discbulge, facet osteophytic hypertrophy and redundancy of ligamentum flavum. Marked facet osteophytic hypertrophy also noted at L5-S1.    Telemetry, personally reviewed:  10/30/22: sinus 60-80, PVCs, couplets  10/31/22: sinus 70-90, PVCs, couplets

## 2022-10-31 NOTE — DIETITIAN INITIAL EVALUATION ADULT - ADD RECOMMEND
1) Liberalize diet to regular to maximize caloric and nutrient intake.  2) Add ensure + HP shake 1x/day   3) Monitor bowel movements, if no BM for >3 days, consider implementing bowel regimen.  4) MVI w/ minerals daily to ensure 100% RDA met  5) Consider adding thiamine 100 mg daily 2/2 poor PO intake/ malnutrition  6) Meal encouragement; total assistance to increase po intake  7) Obtain vitamin D 25OH level to assess nutriture  8) Confirm goals of care regarding nutrition support  RD will continue to monitor PO intake, labs, hydration, and wt prn.

## 2022-10-31 NOTE — PHYSICAL THERAPY INITIAL EVALUATION ADULT - MANUAL MUSCLE TESTING RESULTS, REHAB EVAL
Rt Wrist Ext: 1+, flex: 3+, Elbow and shoulder 3-, Decreased  on right, other joints 4,/grossly assessed due to

## 2022-10-31 NOTE — DIETITIAN INITIAL EVALUATION ADULT - ORAL INTAKE PTA/DIET HISTORY
Pt lives at [facility]; pt reports that she "loves the food" & has "probably gained weight." Reports good appetite & consuming >75% of ENN.  Pt lives at [facility]; pt reports that she "loves the food." Reports good appetite & consuming >75% of ENN.

## 2022-10-31 NOTE — CONSULT NOTE ADULT - SUBJECTIVE AND OBJECTIVE BOX
HPI:  HPI: The patient is an 89-year-old female who was sent to the emergency room from [facility] because she developed right hand to finger paralysis.  The patient's symptoms started last night and she did not want to come to the ER last night waiting for her symptoms to improve in the morning.  After her symptoms did not improve it was referred to the emergency room. The patient was diagnosed with Covid 19 infection a month ago. The patient was recently diagnosed with right breast mass and biopsy was recommended and scheduled as an outpatient.  The patient is also complaining of a Baker's cyst in the right knee which she wants to be evaluated for by orthopedics.    Following her admission and on further testing, patient found to have enhancement in the posterior cervical spine-etiology unclear. Asked in consult accordingly    PMHx: Recently diagnosed right breast mass,TIA ×4, rheumatoid arthritis, lumbar spinal stenosis, HLD, hypothyroidism,    PSHx: Denies any major surgeries    MEDICATIONS  (STANDING):  aspirin enteric coated 81 milliGRAM(s) Oral daily  calcium carbonate 1250 mG  + Vitamin D (OsCal 500 + D) 1 Tablet(s) Oral daily  heparin   Injectable 5000 Unit(s) SubCutaneous every 8 hours  levothyroxine 100 MICROGram(s) Oral daily  metoprolol succinate ER 25 milliGRAM(s) Oral daily  multivitamin/minerals 1 Tablet(s) Oral daily  pravastatin 40 milliGRAM(s) Oral at bedtime  sucralfate 1 Gram(s) Oral two times a day    MEDICATIONS  (PRN):  acetaminophen     Tablet .. 650 milliGRAM(s) Oral every 6 hours PRN Temp greater or equal to 38C (100.4F), Mild Pain (1 - 3)  aluminum hydroxide/magnesium hydroxide/simethicone Suspension 30 milliLiter(s) Oral every 4 hours PRN Dyspepsia  artificial  tears Solution 1 Drop(s) Both EYES two times a day PRN Dry Eyes  melatonin 3 milliGRAM(s) Oral at bedtime PRN Insomnia  ondansetron Injectable 4 milliGRAM(s) IV Push every 8 hours PRN Nausea and/or Vomiting    Allergies    aspirin (Unknown)  cinnamon (Other)  Flomax (Unknown)  tramadol (Unknown)    Intolerances    Family Hx: Father  from kidney failure at age 99, patient mother had DJD    Social Hx.: not smoking, no alcohol, Moved into [facility] 6 months ago from [place].    ROS c/w HPI/PMH    PE:    Vital Signs Last 24 Hrs  T(C): 36.4 (31 Oct 2022 08:25), Max: 36.6 (31 Oct 2022 04:18)  T(F): 97.5 (31 Oct 2022 08:25), Max: 97.8 (31 Oct 2022 04:18)  HR: 67 (31 Oct 2022 08:25) (67 - 76)  BP: 132/60 (31 Oct 2022 08:25) (132/60 - 154/73)  BP(mean): --  RR: 18 (31 Oct 2022 08:25) (17 - 18)  SpO2: 98% (31 Oct 2022 08:25) (93% - 98%)    Parameters below as of 31 Oct 2022 08:25  Patient On (Oxygen Delivery Method): room air    Patient laying in bed with HOB at 40 degrees eating a sandwich without apparent discomfort  HEENT unremarkable, PERRLA, EOM/I, no facial asymmetry  Neck supple, no tenderness, no posterior masses palpable, AROM without pain, no adenopathy, thyroid nonpalpable, no bruits  Heart with RRR  Lungs clear  Abdomen soft, NT, no rebound or guarding  Extremities with R knee pain with PROM, no other joint swelling or tenderness, mild edema LEs, no cyanosis or clubbing  T-L spine without tenderness, negative SLR  Neuro-A&OX3, C2-10 intact, (+) weakness RUE: 4/5 deltoid, 4+/5 R biceps, 0/5 R wrist ext and MP ext, 4/5 R finger flexors: no weakness LUE or B/L LEs    LABS                        9.8    5.33  )-----------( 245      ( 31 Oct 2022 06:55 )             30.5     10-31    138  |  106  |  22  ----------------------------<  93  3.9   |  26  |  0.60    Ca    8.7      31 Oct 2022 06:55    STUDIES  MRI cervical/thoracic/lumbar spine  FINDINGS:   No prior similar studies are available for review.      1.  Cervical spine:    Cervical vertebral alignment is intact.  Cervical vertebral body heights   are maintained.  Marrow signal intensity within cervical vertebral bodies   and posterior elements is unremarkable.  No osseous expansion, epidural   disease found.    Small rim enhancing collection posterior to the C6   spinous process measuring 1.4 x 1.6 x 1 cm question possible soft tissue   lesion or abscess.    Cervical intervertebral discs demonstrate mild disc degeneration at C6/7   with loss of disc height and signals nucleus pulposus. Mild narrowing of   the RIGHT C4-5, moderate narrowing of the RIGHT C5-6 and marked narrowing   of the BILATERAL C6-7 neural foramina due to uncovertebral spurring and   facet osteophytic hypertrophy. No central canal or foraminal compromise   is recognized.    The cervical cord maintains intact morphology.   No cord signal intensity   change is seen.  No abnormal enhancement occurs within the cervical canal.      2.  Thoracic spine:    Thoracic vertebral alignment is significant for focal kyphosis at T8-9.    Thoracic vertebral body heights are significant for chronic compression   deformities of T8-T9 with loss of 50 and 80% of vertebral body height and   evidence of prior kyphoplasty.  Marrow signal intensity within thoracic   vertebral bodies and posterior elements is heterogeneous at T8 and T9.    There is no abnormal vertebral or paraspinal enhancement.  No osseous   expansion, epidural disease or paraspinal abnormality is found.    Thoracic intervertebral discs maintain intact disc heights and signal   intensity.  No central canal or foraminal compromise is recognized. Tiny   RIGHT paracentral disc herniation at T10-11    The thoracic cord maintains intact morphology.   No cord signal intensity   change is seen.  No abnormal enhancement occurs within the thoracic canal.      3.  Lumbar spine:    Lumbar vertebral alignment is shows grade 1 posterior spondylolisthesis   at L2-3 and grade 1 anterior spondylolisthesis at L4-5 on a degenerative   basis. Grade 1 posterior spondylolisthesis at L5-S1 appears to be due to   BILATERAL L5 spondylolysis.  Lumbar vertebral body heights are   maintained.  Marrow signal intensity within lumbar vertebral bodies and   posterior elements is unremarkable.  No osseous expansion, epidural   disease or paraspinal abnormality is found.  No pathologic vertebral or   soft tissue enhancement is found.    Lumbar intervertebral discs show moderate to severe degenerative disc   disease and spondylosis at L2-3 through L5-S1 with loss of disc height   and associated degenerative endplate changes. Disc bulges at L2-3 through   L5-S1 flatten the ventral thecal sac and narrow the BILATERAL neural   foramina. Moderate to severe central stenosis at L3-4 and L4-5 on a   degenerative basis due to disc bulge, facet osteophytic hypertrophy and  redundancy of ligamentum flavum. Marked facet osteophytic hypertrophy   also noted at L5-S1.    The distal cord maintains intact morphology and signal intensity.  The   conus is normally positioned at L1.  Nerve roots of the cauda equina   appear intact.  No abnormal enhancement occurs within the canal.      CT angio head/neck  No evidence of acute infarction. Bilateral cerebellar   remote infarctions. Ischemic white matter disease lower range typical for   age. Atrophy typical for age. Small pericallosal lipoma.

## 2022-10-31 NOTE — CONSULT NOTE ADULT - ASSESSMENT
The patient is an 89-year-old female who was sent to the emergency room from [facility] because she developed right hand to finger paralysis.  The patient's symptoms started last night and she did not want to come to the ER last night waiting for her symptoms to improve in the morning.  After her symptoms did not improve it was referred to the emergency room. The patient was diagnosed with Covid 19 infection a month ago. The patient was recently diagnosed with right breast mass and biopsy was recommended and scheduled as an outpatient.  The patient is also complaining of a Baker's cyst in the right knee which she wants to be evaluated for by orthopedics.    Following her admission and on further testing, patient found to have enhancement in the posterior cervical spine-etiology unclear. Asked in consult accordingly    IMP:  Acute RUE weakness-?etiology?  Small rim enhancing collection posterior to the C6 spinous process measuring 1.4 x 1.6 x 1 cm question possible soft tissue lesion or abscess.-?etiology?    PLAN:  Patient admitted to Medicine  Medical management  Neurology notes appreciated  Check ESR/CRP  PT

## 2022-10-31 NOTE — DIETITIAN INITIAL EVALUATION ADULT - FEEDING SKILL
Tray set-up, open all containers; meal encouragement/minimal assistance/age appropriate assistance Tray set-up, open all containers; meal encouragement/total assistance/age appropriate assistance

## 2022-11-01 PROBLEM — Z00.00 ENCOUNTER FOR PREVENTIVE HEALTH EXAMINATION: Status: ACTIVE | Noted: 2022-11-01

## 2022-11-01 LAB
CRP SERPL-MCNC: <3 MG/L — SIGNIFICANT CHANGE UP
HAV IGM SER-ACNC: SIGNIFICANT CHANGE UP
HBV CORE IGM SER-ACNC: SIGNIFICANT CHANGE UP
HBV SURFACE AG SER-ACNC: SIGNIFICANT CHANGE UP
HCV AB S/CO SERPL IA: 0.13 S/CO — SIGNIFICANT CHANGE UP (ref 0–0.99)
HCV AB SERPL-IMP: SIGNIFICANT CHANGE UP
HIV 1+2 AB+HIV1 P24 AG SERPL QL IA: SIGNIFICANT CHANGE UP

## 2022-11-01 PROCEDURE — 99233 SBSQ HOSP IP/OBS HIGH 50: CPT

## 2022-11-01 PROCEDURE — 99232 SBSQ HOSP IP/OBS MODERATE 35: CPT

## 2022-11-01 PROCEDURE — 71552 MRI CHEST W/O & W/DYE: CPT | Mod: 26,RT

## 2022-11-01 PROCEDURE — 93306 TTE W/DOPPLER COMPLETE: CPT | Mod: 26

## 2022-11-01 RX ADMIN — Medication 1 TABLET(S): at 10:34

## 2022-11-01 RX ADMIN — Medication 100 MICROGRAM(S): at 06:56

## 2022-11-01 RX ADMIN — HEPARIN SODIUM 5000 UNIT(S): 5000 INJECTION INTRAVENOUS; SUBCUTANEOUS at 21:45

## 2022-11-01 RX ADMIN — Medication 25 MILLIGRAM(S): at 10:34

## 2022-11-01 RX ADMIN — HEPARIN SODIUM 5000 UNIT(S): 5000 INJECTION INTRAVENOUS; SUBCUTANEOUS at 05:27

## 2022-11-01 RX ADMIN — Medication 1 DROP(S): at 19:47

## 2022-11-01 RX ADMIN — Medication 81 MILLIGRAM(S): at 10:34

## 2022-11-01 RX ADMIN — Medication 40 MILLIGRAM(S): at 21:44

## 2022-11-01 RX ADMIN — HEPARIN SODIUM 5000 UNIT(S): 5000 INJECTION INTRAVENOUS; SUBCUTANEOUS at 14:25

## 2022-11-01 RX ADMIN — Medication 1 GRAM(S): at 21:45

## 2022-11-01 RX ADMIN — Medication 1 GRAM(S): at 10:35

## 2022-11-01 NOTE — PROGRESS NOTE ADULT - SUBJECTIVE AND OBJECTIVE BOX
Interval History:  11/1/22: No new events.    MEDICATIONS  (STANDING):  aspirin enteric coated 81 milliGRAM(s) Oral daily  calcium carbonate 1250 mG  + Vitamin D (OsCal 500 + D) 1 Tablet(s) Oral daily  heparin   Injectable 5000 Unit(s) SubCutaneous every 8 hours  levothyroxine 100 MICROGram(s) Oral daily  metoprolol succinate ER 25 milliGRAM(s) Oral daily  multivitamin/minerals 1 Tablet(s) Oral daily  pravastatin 40 milliGRAM(s) Oral at bedtime  sucralfate 1 Gram(s) Oral two times a day    MEDICATIONS  (PRN):  acetaminophen     Tablet .. 650 milliGRAM(s) Oral every 6 hours PRN Temp greater or equal to 38C (100.4F), Mild Pain (1 - 3)  aluminum hydroxide/magnesium hydroxide/simethicone Suspension 30 milliLiter(s) Oral every 4 hours PRN Dyspepsia  artificial  tears Solution 1 Drop(s) Both EYES two times a day PRN Dry Eyes  melatonin 3 milliGRAM(s) Oral at bedtime PRN Insomnia  ondansetron Injectable 4 milliGRAM(s) IV Push every 8 hours PRN Nausea and/or Vomiting      Allergies    aspirin (Unknown)  cinnamon (Other)  Flomax (Unknown)  tramadol (Unknown)    Intolerances        PHYSICAL EXAM:  Vital Signs Last 24 Hrs  T(F): 97.7 (11-01-22 @ 08:30)  HR: 69 (11-01-22 @ 08:30)  BP: 107/83 (11-01-22 @ 08:30)  RR: 18 (11-01-22 @ 08:30)    GENERAL: NAD, well-groomed  HEAD:  Atraumatic, Normocephalic  Neuro:  HF: A x O x 3. Appropriately interactive, normal affect. Speech fluent, No Aphasia or paraphasic errors. Naming /repetition intact   CN: BRIDGETTE, EOMI, VFF, facial sensation normal, no NLFD, tongue midline, Palate moves equally, SCM equal bilaterally  Motor: +weakness 0/5 in right wrist extension, subtle weakness 4+/5 right brachioradialis, able to extend fingers on right, normal finger flexion, wrist flexion, and finger flexion. Decreased  on right. Full strength in LUE. No focal weakness in lower extremities.    Sens: Intact to light touch / VS    Reflexes: Symmetric and normal . BJ 1+, BR 1+, KJ 1+, AJ 1+, downgoing toes b/l  Coord:  No FNFA, dysmetria, PAUL intact         LABS:                        9.8    5.33  )-----------( 245      ( 31 Oct 2022 06:55 )             30.5     10-31    138  |  106  |  22  ----------------------------<  93  3.9   |  26  |  0.60    Ca    8.7      31 Oct 2022 06:55            RADIOLOGY & ADDITIONAL STUDIES:  CT head and cervical spine 10/31/22:  CT HEAD: No acute intracranial hemorrhage or mass affect. Chronic small   vessel ischemic changes and lacunar infarcts.    CT CERVICAL SPINE: No acute cervical spine fracture or traumatic   malalignment. Multilevel cervical spondylosis.      MR head, MRA head and neck 10/31/22:  1.  RIGHT CAROTID NECK CIRCULATION:   Intact.    2.  LEFT CAROTID NECK CIRCULATION:    Intact.    3.  VERTEBRAL NECK CIRCULATION:   Intact    4.  ANTERIOR INTRACRANIAL CIRCULATION:     Intracranial atherosclerosis   cavernous carotid segments of the internal carotid arteries, while.    5.  POSTERIOR INTRACRANIAL CIRCULATION:   Intact.    6.  BRAIN:    No evidence of acute infarction. Bilateral cerebellar   remote infarctions. Ischemic white matter disease lower range typical for   age. Atrophy typical for age. Small pericallosal lipoma.      MR cervical, thoracic and lumbar spien with and without contrast 10/31/22:      1.   Cervical spine:   Small rim enhancing collection posterior to   the C6 spinous process measuring 1.4 x 1.6 x 1 cm question possiblesoft   tissue lesion or abscess.   No evidence of osseous metastases. Mild disc   degeneration at C6/7 with loss of disc height and signals nucleus   pulposus. Mild narrowing of the RIGHT C4-5, moderate narrowing of the   RIGHT C5-6 and marked narrowing of the BILATERAL C6-7 neural foramina due   to uncovertebral spurring and facet osteophytic hypertrophy.        2.   Thoracic spine:   focal kyphosis at T8-9.  Thoracic vertebral   body heights are significant for chronic compression deformities of T8-T9   with loss of 50 and 80% of vertebral body height and evidence of prior   kyphoplasty.   Tiny RIGHT paracentral disc herniation at T9-T10.        3.   Lumbar spine:   Grade 1 posterior spondylolisthesis at L2-3 and   grade 1 anterior spondylolisthesis at L4-5 on a degenerative basis. Grade   1 posterior spondylolisthesis at L5-S1 appears to be due to BILATERAL L5   spondylolysis.   Moderate to severe degenerative disc disease and   spondylosis at L2-3 through L5-S1 with loss of disc height and associated   degenerative endplate changes. Disc bulges at L2-3 through L5-S1 flatten   the ventral thecal sac and narrow the BILATERAL neural foramina. Moderate   to severe central stenosis at L3-4 and L4-5 on a degenerative basis due   to disc bulge, facet osteophytic hypertrophy and redundancy of ligamentum   flavum. Marked facet osteophytic hypertrophy also noted at L5-S1.

## 2022-11-01 NOTE — PROGRESS NOTE ADULT - ASSESSMENT
Ms. Alvarado is an 89 yo woman with an acute onset of a right wrist drop.  She reports that symptoms started suddenly while she was awake.  The differential diagnosis includes stroke affecting right hand cortical representation, metastatic lesion, radial neuropathy, brachial plexopathy.  There is a question of a metastatic lesion in thoracic spine seen on chest Xray.      -MRI brain does not show any acute stroke. There is evidence of prior infarcts. Aspirin is listed as an allergy. Will get more information on whether she has true allergy. Would be hesitant to start Plavix at this time if she is awaiting breast biopsy.  -No metastatic disease seen in spine. No pathology to suggest wrist/hand weakness.  -MRI brachial plexus. If negative then this is likely a radial nerve palsy.  -Ortho consult appreciated.  -DVT prophylaxis  -PT/OT    Will f/u as needed.

## 2022-11-01 NOTE — CONSULT NOTE ADULT - SUBJECTIVE AND OBJECTIVE BOX
Patient is a 89y old  Female who presents with a chief complaint of Right wrist paralysis r/o CVA     HPI:  88 y/o female with h/o recently diagnosed right breat mass, prior TIA, RA, lumbar spine stenosis, HL, hypothyroidism, recent COVID was admitted on 10/29 from [facility] for right hand fingers paralysis. The patient's symptoms started the night PTA. After her symptoms did not improve she was referred to the emergency room. The patient was recently diagnosed with right breast mass and biopsy was recommended and was scheduled as an outpatient. On 10/31 she underwent MRI of cervical spine and was reported with a small rim enhancing collection posterior to the C6 spinous process measuring 1.4 x 1.6 x 1 cm. The question was raised for possible soft tissue lesion or abscess.     No fever or chills reported.     PMHx: Recently diagnosed right breast mass, TIA ×4, rheumatoid arthritis, lumbar spinal stenosis, HLD, hypothyroidism  PSHx: Denies any major surgeries    Meds: per reconciliation sheet, noted below  MEDICATIONS  (STANDING):  aspirin enteric coated 81 milliGRAM(s) Oral daily  calcium carbonate 1250 mG  + Vitamin D (OsCal 500 + D) 1 Tablet(s) Oral daily  heparin   Injectable 5000 Unit(s) SubCutaneous every 8 hours  levothyroxine 100 MICROGram(s) Oral daily  metoprolol succinate ER 25 milliGRAM(s) Oral daily  multivitamin/minerals 1 Tablet(s) Oral daily  pravastatin 40 milliGRAM(s) Oral at bedtime  sucralfate 1 Gram(s) Oral two times a day    MEDICATIONS  (PRN):  acetaminophen     Tablet .. 650 milliGRAM(s) Oral every 6 hours PRN Temp greater or equal to 38C (100.4F), Mild Pain (1 - 3)  aluminum hydroxide/magnesium hydroxide/simethicone Suspension 30 milliLiter(s) Oral every 4 hours PRN Dyspepsia  artificial  tears Solution 1 Drop(s) Both EYES two times a day PRN Dry Eyes  melatonin 3 milliGRAM(s) Oral at bedtime PRN Insomnia  ondansetron Injectable 4 milliGRAM(s) IV Push every 8 hours PRN Nausea and/or Vomiting    Allergies    aspirin (Unknown)  cinnamon (Other)  Flomax (Unknown)  tramadol (Unknown)    Intolerances    Social: no smoking, no alcohol, no illegal drugs; no recent travel, no exposure to TB  FAMILY HISTORY:  No pertinent family history in first degree relatives    ROS: the patient denies fever, no chills, no HA, no seizures, no dizziness, no sore throat, no nasal congestion, no blurry vision, no CP, no palpitations, no SOB, no cough, no abdominal pain, no diarrhea, no N/V, no dysuria, no leg pain, no claudication, no rash, no joint aches, no rectal pain or bleeding, no night sweats  All other systems reviewed and are negative    Vital Signs Last 24 Hrs  T(C): 36.5 (01 Nov 2022 08:30), Max: 36.8 (31 Oct 2022 20:12)  T(F): 97.7 (01 Nov 2022 08:30), Max: 98.2 (31 Oct 2022 20:12)  HR: 69 (01 Nov 2022 08:30) (69 - 75)  BP: 107/83 (01 Nov 2022 08:30) (107/83 - 128/50)  BP(mean): --  RR: 18 (01 Nov 2022 08:30) (18 - 18)  SpO2: 97% (01 Nov 2022 08:30) (96% - 97%)    Parameters below as of 01 Nov 2022 08:30  Patient On (Oxygen Delivery Method): room air    PE:    Constitutional:  No acute distress  HEENT: NC/AT, EOMI, PERRLA, conjunctivae clear; ears and nose atraumatic; pharynx benign  Neck: supple; thyroid not palpable  Back: no tenderness  Respiratory: respiratory effort normal; clear to auscultation  Cardiovascular: S1S2 regular, no murmurs  Abdomen: soft, not tender, not distended, positive BS; no liver or spleen organomegaly  Genitourinary: no suprapubic tenderness  Lymphatic: no LN palpable  Musculoskeletal: no muscle tenderness, no joint swelling or tenderness  Extremities: no pedal edema  Neurological/ Psychiatric: AxOx3, judgement and insight normal; moving all extremities  Skin: no rashes; no palpable lesions    Labs: all available labs reviewed                        9.8    5.33  )-----------( 245      ( 31 Oct 2022 06:55 )             30.5     10-31    138  |  106  |  22  ----------------------------<  93  3.9   |  26  |  0.60    Ca    8.7      31 Oct 2022 06:55      Radiology: all available radiological tests reviewed    < from: MR Angio Neck No Cont (10.31.22 @ 12:09) >  1.  RIGHT CAROTID NECK CIRCULATION:   Intact.  2.  LEFT CAROTID NECK CIRCULATION:    Intact.  3.  VERTEBRAL NECK CIRCULATION:   Intact  4.  ANTERIOR INTRACRANIAL CIRCULATION:     Intracranial atherosclerosis   cavernous carotid segments of the internal carotid arteries, while.  5.  POSTERIOR INTRACRANIAL CIRCULATION:   Intact.  6.  BRAIN:    No evidence of acute infarction. Bilateral cerebellar   remote infarctions. Ischemic white matter disease lower range typical for   age. Atrophytypical for age. Small pericallosal lipoma.  < end of copied text >    < from: MR Cervical Spine w/wo IV Cont (10.31.22 @ 12:08) >      1.   Cervical spine:   Small rim enhancing collection posterior to   the C6 spinous process measuring 1.4 x 1.6 x 1 cm question possiblesoft   tissue lesion or abscess.   No evidence of osseous metastases. Mild disc   degeneration at C6/7 with loss of disc height and signals nucleus   pulposus. Mild narrowing of the RIGHT C4-5, moderate narrowing of the   RIGHT C5-6 and marked narrowing of the BILATERAL C6-7 neural foramina due   to uncovertebral spurring and facet osteophytic hypertrophy.        2.   Thoracic spine:   focal kyphosis at T8-9.  Thoracic vertebral   body heights are significant for chronic compression deformities of T8-T9   with loss of 50 and 80% of vertebral body height and evidence of prior   kyphoplasty.   Tiny RIGHT paracentral disc herniation at T9-T10.        3.   Lumbar spine:   Grade 1 posterior spondylolisthesis at L2-3 and   grade 1 anterior spondylolisthesis at L4-5 on a degenerative basis. Grade   1 posterior spondylolisthesis at L5-S1 appears to be due to BILATERAL L5   spondylolysis.   Moderate to severe degenerative disc disease and   spondylosis at L2-3 through L5-S1 with loss of disc height and associated   degenerative endplate changes. Disc bulges at L2-3 through L5-S1 flatten   the ventral thecal sac and narrow the BILATERAL neural foramina. Moderate   to severe central stenosis at L3-4 and L4-5 on a degenerative basis due   to discbulge, facet osteophytic hypertrophy and redundancy of ligamentum   flavum. Marked facet osteophytic hypertrophy also noted at L5-S1.  < end of copied text >      Advanced directives addressed: full resuscitation Patient is a 89y old  Female who presents with a chief complaint of Right wrist paralysis r/o CVA     HPI:  89 y/o female with h/o recently diagnosed right breat mass, prior TIA, RA, lumbar spine stenosis, HL, hypothyroidism, recent COVID was admitted on 10/29 from [facility] for right hand fingers paralysis. The patient's symptoms started the night PTA. After her symptoms did not improve she was referred to the emergency room. The patient was recently diagnosed with right breast mass and biopsy was recommended and was scheduled as an outpatient. On 10/31 she underwent MRI of cervical spine and was reported with a small rim enhancing collection posterior to the C6 spinous process measuring 1.4 x 1.6 x 1 cm. The question was raised for possible soft tissue lesion or abscess.     No fever or chills reported.     PMHx: Recently diagnosed right breast mass, TIA ×4, rheumatoid arthritis, lumbar spinal stenosis, HLD, hypothyroidism  PSHx: Denies any major surgeries    Meds: per reconciliation sheet, noted below  MEDICATIONS  (STANDING):  aspirin enteric coated 81 milliGRAM(s) Oral daily  calcium carbonate 1250 mG  + Vitamin D (OsCal 500 + D) 1 Tablet(s) Oral daily  heparin   Injectable 5000 Unit(s) SubCutaneous every 8 hours  levothyroxine 100 MICROGram(s) Oral daily  metoprolol succinate ER 25 milliGRAM(s) Oral daily  multivitamin/minerals 1 Tablet(s) Oral daily  pravastatin 40 milliGRAM(s) Oral at bedtime  sucralfate 1 Gram(s) Oral two times a day    MEDICATIONS  (PRN):  acetaminophen     Tablet .. 650 milliGRAM(s) Oral every 6 hours PRN Temp greater or equal to 38C (100.4F), Mild Pain (1 - 3)  aluminum hydroxide/magnesium hydroxide/simethicone Suspension 30 milliLiter(s) Oral every 4 hours PRN Dyspepsia  artificial  tears Solution 1 Drop(s) Both EYES two times a day PRN Dry Eyes  melatonin 3 milliGRAM(s) Oral at bedtime PRN Insomnia  ondansetron Injectable 4 milliGRAM(s) IV Push every 8 hours PRN Nausea and/or Vomiting    Allergies    aspirin (Unknown)  cinnamon (Other)  Flomax (Unknown)  tramadol (Unknown)    Intolerances    Social: no smoking, no alcohol, no illegal drugs; no recent travel, no exposure to TB  FAMILY HISTORY:  No pertinent family history in first degree relatives    ROS: the patient denies fever, no chills, no HA, no seizures, no dizziness, no sore throat, no nasal congestion, no blurry vision, no CP, no palpitations, no SOB, no cough, no abdominal pain, no diarrhea, no N/V, no dysuria, no leg pain, no claudication, no rash, no joint aches, no rectal pain or bleeding, no night sweats  All other systems reviewed and are negative    Vital Signs Last 24 Hrs  T(C): 36.5 (01 Nov 2022 08:30), Max: 36.8 (31 Oct 2022 20:12)  T(F): 97.7 (01 Nov 2022 08:30), Max: 98.2 (31 Oct 2022 20:12)  HR: 69 (01 Nov 2022 08:30) (69 - 75)  BP: 107/83 (01 Nov 2022 08:30) (107/83 - 128/50)  BP(mean): --  RR: 18 (01 Nov 2022 08:30) (18 - 18)  SpO2: 97% (01 Nov 2022 08:30) (96% - 97%)    Parameters below as of 01 Nov 2022 08:30  Patient On (Oxygen Delivery Method): room air    PE:    Constitutional:  No acute distress  HEENT: NC/AT, EOMI, PERRLA, conjunctivae clear; ears and nose atraumatic; pharynx benign  Neck: supple; thyroid not palpable  Back: no tenderness; no cervical spine tenderness; full ROM  Respiratory: respiratory effort normal; clear to auscultation  Cardiovascular: S1S2 regular, no murmurs  Abdomen: soft, not tender, not distended, positive BS; no liver or spleen organomegaly  Genitourinary: no suprapubic tenderness  Lymphatic: no LN palpable  Musculoskeletal: no muscle tenderness, no joint swelling or tenderness  Extremities: no pedal edema  Neurological/ Psychiatric: AxOx3, judgement and insight normal; moving all extremities  Skin: no rashes; no palpable lesions    Labs: all available labs reviewed                        9.8    5.33  )-----------( 245      ( 31 Oct 2022 06:55 )             30.5     10-31    138  |  106  |  22  ----------------------------<  93  3.9   |  26  |  0.60    Ca    8.7      31 Oct 2022 06:55      Radiology: all available radiological tests reviewed    < from: MR Angio Neck No Cont (10.31.22 @ 12:09) >  1.  RIGHT CAROTID NECK CIRCULATION:   Intact.  2.  LEFT CAROTID NECK CIRCULATION:    Intact.  3.  VERTEBRAL NECK CIRCULATION:   Intact  4.  ANTERIOR INTRACRANIAL CIRCULATION:     Intracranial atherosclerosis   cavernous carotid segments of the internal carotid arteries, while.  5.  POSTERIOR INTRACRANIAL CIRCULATION:   Intact.  6.  BRAIN:    No evidence of acute infarction. Bilateral cerebellar   remote infarctions. Ischemic white matter disease lower range typical for   age. Atrophytypical for age. Small pericallosal lipoma.  < end of copied text >    < from: MR Cervical Spine w/wo IV Cont (10.31.22 @ 12:08) >      1.   Cervical spine:   Small rim enhancing collection posterior to   the C6 spinous process measuring 1.4 x 1.6 x 1 cm question possiblesoft   tissue lesion or abscess.   No evidence of osseous metastases. Mild disc   degeneration at C6/7 with loss of disc height and signals nucleus   pulposus. Mild narrowing of the RIGHT C4-5, moderate narrowing of the   RIGHT C5-6 and marked narrowing of the BILATERAL C6-7 neural foramina due   to uncovertebral spurring and facet osteophytic hypertrophy.        2.   Thoracic spine:   focal kyphosis at T8-9.  Thoracic vertebral   body heights are significant for chronic compression deformities of T8-T9   with loss of 50 and 80% of vertebral body height and evidence of prior   kyphoplasty.   Tiny RIGHT paracentral disc herniation at T9-T10.        3.   Lumbar spine:   Grade 1 posterior spondylolisthesis at L2-3 and   grade 1 anterior spondylolisthesis at L4-5 on a degenerative basis. Grade   1 posterior spondylolisthesis at L5-S1 appears to be due to BILATERAL L5   spondylolysis.   Moderate to severe degenerative disc disease and   spondylosis at L2-3 through L5-S1 with loss of disc height and associated   degenerative endplate changes. Disc bulges at L2-3 through L5-S1 flatten   the ventral thecal sac and narrow the BILATERAL neural foramina. Moderate   to severe central stenosis at L3-4 and L4-5 on a degenerative basis due   to discbulge, facet osteophytic hypertrophy and redundancy of ligamentum   flavum. Marked facet osteophytic hypertrophy also noted at L5-S1.  < end of copied text >      Advanced directives addressed: full resuscitation

## 2022-11-01 NOTE — PROGRESS NOTE ADULT - PROBLEM SELECTOR PLAN 2
Elevated troponin, without complaints of angina,  possible demand ischemia underlying hypertensive heart disease   Echocardiogram shows moderately reduced LVF, EF 40-45%, fibrocalcific changes AV leaflets with restriction of leaflet excursion.  LAURA .85 c/w severe AS; fibrocalcific changes mitral valve repair posterior leaflet with reduced leaflet excursion    will need ischemic w/u     Continue BB/Statin Elevated troponin, without complaints of angina,  possible demand ischemia underlying hypertensive heart disease   Echocardiogram shows moderately reduced LVF, EF 40-45%, fibrocalcific changes AV leaflets with restriction of leaflet excursion.  LAURA .85 c/w severe AS; fibrocalcific changes mitral valve repair posterior leaflet with reduced leaflet excursion, inferior wall akinesis    will need ischemic w/u.  Recommend cardiac catheterization.  Will discuss with interventional cardiology     Continue BB/Statin

## 2022-11-01 NOTE — PROGRESS NOTE ADULT - ASSESSMENT
The patient is an 89-year-old female who was sent to the emergency room from [facility] because she developed right hand to finger paralysis.  The patient's symptoms started last night and she did not want to come to the ER last night waiting for her symptoms to improve in the morning.  After her symptoms did not improve it was referred to the emergency room. The patient was diagnosed with Covid 19 infection a month ago. The patient was recently diagnosed with right breast mass and biopsy was recommended and scheduled as an outpatient.  The patient is also complaining of a Baker's cyst in the right knee which she wants to be evaluated for by orthopedics.    Following her admission and on further testing, patient found to have enhancement in the posterior cervical spine-etiology unclear. Asked in consult accordingly    IMP:  Acute RUE weakness-?etiology?  Small rim enhancing collection posterior to the C6 spinous process measuring 1.4 x 1.6 x 1 cm question possible soft tissue lesion or abscess.-?etiology?    PLAN:  Patient admitted to Medicine  Medical management  Neurology notes appreciated  Normal ESR/CRP  Spoke with Dr. Waldrop-suggest ID evaluation to see if they have any thoughts about further testing if warranted  PT

## 2022-11-01 NOTE — PROGRESS NOTE ADULT - SUBJECTIVE AND OBJECTIVE BOX
HOSPITALIST ATTENDING PROGRESS NOTE    Chart and meds reviewed.  Patient seen and examined.    CC: R wrist paralysis    Subjective: Continue R wrist sx. Denies CP, SOB.    All other systems reviewed and found to be negative with the exception of what has been described above.    MEDICATIONS  (STANDING):  aspirin enteric coated 81 milliGRAM(s) Oral daily  calcium carbonate 1250 mG  + Vitamin D (OsCal 500 + D) 1 Tablet(s) Oral daily  heparin   Injectable 5000 Unit(s) SubCutaneous every 8 hours  levothyroxine 100 MICROGram(s) Oral daily  metoprolol succinate ER 25 milliGRAM(s) Oral daily  multivitamin/minerals 1 Tablet(s) Oral daily  pravastatin 40 milliGRAM(s) Oral at bedtime  sucralfate 1 Gram(s) Oral two times a day    MEDICATIONS  (PRN):  acetaminophen     Tablet .. 650 milliGRAM(s) Oral every 6 hours PRN Temp greater or equal to 38C (100.4F), Mild Pain (1 - 3)  aluminum hydroxide/magnesium hydroxide/simethicone Suspension 30 milliLiter(s) Oral every 4 hours PRN Dyspepsia  artificial  tears Solution 1 Drop(s) Both EYES two times a day PRN Dry Eyes  melatonin 3 milliGRAM(s) Oral at bedtime PRN Insomnia  ondansetron Injectable 4 milliGRAM(s) IV Push every 8 hours PRN Nausea and/or Vomiting      VITALS:  T(F): 97.7 (11-01-22 @ 08:30), Max: 98.2 (10-31-22 @ 20:12)  HR: 69 (11-01-22 @ 08:30) (69 - 75)  BP: 107/83 (11-01-22 @ 08:30) (107/83 - 128/50)  RR: 18 (11-01-22 @ 08:30) (18 - 18)  SpO2: 97% (11-01-22 @ 08:30) (96% - 97%)  Wt(kg): --    I&O's Summary      CAPILLARY BLOOD GLUCOSE          PHYSICAL EXAM:  Gen: NAD  HEENT:  pupils equal and reactive, EOMI, no oropharyngeal lesions, erythema, exudates, oral thrush  NECK:   supple, no carotid bruits, no palpable lymph nodes, no thyromegaly  CV:  +S1, +S2, regular, no murmurs or rubs  RESP:   lungs clear to auscultation bilaterally, no wheezing, rales, rhonchi, good air entry bilaterally  BREAST:  not examined  GI:  abdomen soft, non-tender, non-distended, normal BS, no bruits, no abdominal masses, no palpable masses  RECTAL:  not examined  :  not examined  MSK:   normal muscle tone, no atrophy, no rigidity, no contractions  EXT:  no clubbing, no cyanosis, no edema, no calf pain, swelling or erythema  VASCULAR:  pulses equal and symmetric in the upper and lower extremities  NEURO:  AAOX3, R wrist/hand weakness, follows all commands, able to move extremities spontaneously  SKIN:  no ulcers, lesions or rashes    LABS:                            9.8    5.33  )-----------( 245      ( 31 Oct 2022 06:55 )             30.5     10-31    138  |  106  |  22  ----------------------------<  93  3.9   |  26  |  0.60    Ca    8.7      31 Oct 2022 06:55    CULTURES:  no new    Additional results/Imaging, I have personally reviewed:  CTH, CT cspine 10/29/22: CT HEAD: No acute intracranial hemorrhage or mass affect. Chronic small vessel ischemic changes and lacunar infarcts. CT CERVICAL SPINE: No acute cervical spine fracture or traumatic malalignment. Multilevel cervical spondylosis.    CXR, R hand xray 10/29/22: Question blastic metastatic disease of a lower thoracic body. No acute lung finding. No fracture of the right hand.    MRI head w/wo contrast, MRA H/N 10/31/22: 1.  RIGHT CAROTID NECK CIRCULATION:   Intact. 2.  LEFT CAROTID NECK CIRCULATION:    Intact. 3.  VERTEBRAL NECK CIRCULATION:   Intact 4.  ANTERIOR INTRACRANIAL CIRCULATION:     Intracranial atherosclerosis cavernous carotid segments of the internal carotid arteries, while. 5.  POSTERIOR INTRACRANIAL CIRCULATION:   Intact. 6.  BRAIN:    No evidence of acute infarction. Bilateral cerebellar remote infarctions. Ischemic white matter disease lower range typical for age. Atrophytypical for age. Small pericallosal lipoma.    MR C, T, L spine 10/31/22: IMPRESSION: No evidence of osseous metastases 1.   Cervical spine:   Small rim enhancing collection posterior to the C6 spinous process measuring 1.4 x 1.6 x 1 cm question possiblesoft tissue lesion or abscess.   No evidence of osseous metastases. Mild disc degeneration at C6/7 with loss of disc height and signals nucleus pulposus. Mild narrowing of the RIGHT C4-5, moderate narrowing of the RIGHT C5-6 and marked narrowing of the BILATERAL C6-7 neural foramina due to uncovertebral spurring and facet osteophytic hypertrophy. 2.   Thoracic spine:   focal kyphosis at T8-9.  Thoracic vertebral body heights are significant for chronic compression deformities of T8-T9 with loss of 50 and 80% of vertebral body height and evidence of prior kyphoplasty.   Tiny RIGHT paracentral disc herniation at T9-T10. 3.   Lumbar spine:   Grade 1 posterior spondylolisthesis at L2-3 and grade 1 anterior spondylolisthesis at L4-5 on a degenerative basis. Grade 1 posterior spondylolisthesis at L5-S1 appears to be due to BILATERAL L5 spondylolysis.   Moderate to severe degenerative disc disease and spondylosis at L2-3 through L5-S1 with loss of disc height and associated degenerative endplate changes. Disc bulges at L2-3 through L5-S1 flatten the ventral thecal sac and narrow the BILATERAL neural foramina. Moderate to severe central stenosis at L3-4 and L4-5 on a degenerative basis due to disc bulge, facet osteophytic hypertrophy and redundancy of ligamentum flavum. Marked facet osteophytic hypertrophy also noted at L5-S1.    MRI R brachial plexus 11/1/22: Grossly unremarkable right brachial plexus. Moderate levoscoliosis of the cervical spine. Severe right facet arthropathy at C3-4, C5-6 and C7-T1 with marked associated edema and enhancement at C3-C4, and possible early ankylosis at C5-6 and C7-T1.    Echo 11/1/22: Significant fibrocalcific changes noted to the aortic valve leaflets with restriction in leaflet excursion. Calculated LAURA is .85cm2 ; this finding is consistent with severe aortic stenosis. The left atrium is moderately dilated. Mild concentric left ventricular hypertrophy is present. Moderately reduced left ventricular systolic function with inferior wall akinesis. Estimated left ventricular ejection fraction is 40-45 %. The IVC appears normal. Moderate (2+) mitral regurgitation is present. Mild mitral annular calcification is present. Fibrocalcific changes noted to the mitral valve repair-posterior leaflet with reduced leaflet excursion. No evidence of pericardial effusion. No evidence of pleural effusion. Normal appearing pulmonic valve structure and function. Normal appearing right atrium. Normal appearing right ventricle structure and function. Mild (1+) tricuspid valve regurgitation is present.    Telemetry, personally reviewed:  10/30/22: sinus 60-80, PVCs, couplets  10/31/22: sinus 70-90, PVCs, couplets  11/1/22: sinus 60-80, 5 beats aberrancy triplet

## 2022-11-01 NOTE — PROGRESS NOTE ADULT - SUBJECTIVE AND OBJECTIVE BOX
HPI:  HPI: The patient is an 89-year-old female who was sent to the emergency room from [facility] because she developed right hand to finger paralysis.  The patient's symptoms started last night and she did not want to come to the ER last night waiting for her symptoms to improve in the morning.  After her symptoms did not improve it was referred to the emergency room. The patient was diagnosed with Covid 19 infection a month ago. The patient was recently diagnosed with right breast mass and biopsy was recommended and scheduled as an outpatient.  The patient is also complaining of a Baker's cyst in the right knee which she wants to be evaluated for by orthopedics.    Following her admission and on further testing, patient found to have enhancement in the posterior cervical spine-etiology unclear. Asked in consult accordingly    22 Patient with mold neck pain with change in position    PMHx: Recently diagnosed right breast mass,TIA ×4, rheumatoid arthritis, lumbar spinal stenosis, HLD, hypothyroidism,    PSHx: Denies any major surgeries    MEDICATIONS  (STANDING):  aspirin enteric coated 81 milliGRAM(s) Oral daily  calcium carbonate 1250 mG  + Vitamin D (OsCal 500 + D) 1 Tablet(s) Oral daily  heparin   Injectable 5000 Unit(s) SubCutaneous every 8 hours  levothyroxine 100 MICROGram(s) Oral daily  metoprolol succinate ER 25 milliGRAM(s) Oral daily  multivitamin/minerals 1 Tablet(s) Oral daily  pravastatin 40 milliGRAM(s) Oral at bedtime  sucralfate 1 Gram(s) Oral two times a day    MEDICATIONS  (PRN):  acetaminophen     Tablet .. 650 milliGRAM(s) Oral every 6 hours PRN Temp greater or equal to 38C (100.4F), Mild Pain (1 - 3)  aluminum hydroxide/magnesium hydroxide/simethicone Suspension 30 milliLiter(s) Oral every 4 hours PRN Dyspepsia  artificial  tears Solution 1 Drop(s) Both EYES two times a day PRN Dry Eyes  melatonin 3 milliGRAM(s) Oral at bedtime PRN Insomnia  ondansetron Injectable 4 milliGRAM(s) IV Push every 8 hours PRN Nausea and/or Vomiting    Allergies    aspirin (Unknown)  cinnamon (Other)  Flomax (Unknown)  tramadol (Unknown)    Intolerances    Family Hx: Father  from kidney failure at age 99, patient mother had DJD    Social Hx.: not smoking, no alcohol, Moved into [facility] assisted living 6 months ago from [place].    ROS c/w HPI/PMH    PE:    Vital Signs Last 24 Hrs  T(C): 36.5 (2022 08:30), Max: 36.8 (31 Oct 2022 20:12)  T(F): 97.7 (2022 08:30), Max: 98.2 (31 Oct 2022 20:12)  HR: 69 (2022 08:30) (69 - 75)  BP: 107/83 (2022 08:30) (107/83 - 128/50)  BP(mean): --  RR: 18 (2022 08:30) (18 - 18)  SpO2: 97% (2022 08:30) (96% - 97%)    Parameters below as of 2022 08:30  Patient On (Oxygen Delivery Method): room air    Patient sitting in bedside chair eating lunch with c/o intermittent neck pain with change in position  Neck supple, mild tenderness over SCM bilaterally, no posterior tenderness or masses palpable, flex/ext with mild pain, no adenopathy  Extremities with R knee pain with PROM, no other joint swelling or tenderness, mild edema LEs, no cyanosis or clubbing  T-L spine without tenderness, negative SLR  Neuro-A&OX3, C2-10 intact, (+) weakness RUE: 4/5 deltoid, 4+/5 R biceps, 0/5 R wrist ext and MP ext, 4/5 R finger flexors: no weakness LUE or B/L LEs    LABS                        9.8    5.33  )-----------( 245      ( 31 Oct 2022 06:55 )             30.5     10-    138  |  106  |  22  ----------------------------<  93  3.9   |  26  |  0.60    Ca    8.7      31 Oct 2022 06:55    Sedimentation Rate, Erythrocyte (10.31.22 @ 19:09)    Sedimentation Rate, Erythrocyte: 47 mm/hr    C-Reactive Protein, Serum (10.31. @ 19:09)    C-Reactive Protein, Serum: <3: Test Repeated mg/L    STUDIES  MRI cervical/thoracic/lumbar spine  FINDINGS:   No prior similar studies are available for review.      1.  Cervical spine:    Cervical vertebral alignment is intact.  Cervical vertebral body heights   are maintained.  Marrow signal intensity within cervical vertebral bodies   and posterior elements is unremarkable.  No osseous expansion, epidural   disease found.    Small rim enhancing collection posterior to the C6   spinous process measuring 1.4 x 1.6 x 1 cm question possible soft tissue   lesion or abscess.    Cervical intervertebral discs demonstrate mild disc degeneration at C6/7   with loss of disc height and signals nucleus pulposus. Mild narrowing of   the RIGHT C4-5, moderate narrowing of the RIGHT C5-6 and marked narrowing   of the BILATERAL C6-7 neural foramina due to uncovertebral spurring and   facet osteophytic hypertrophy. No central canal or foraminal compromise   is recognized.    The cervical cord maintains intact morphology.   No cord signal intensity   change is seen.  No abnormal enhancement occurs within the cervical canal.      2.  Thoracic spine:    Thoracic vertebral alignment is significant for focal kyphosis at T8-9.    Thoracic vertebral body heights are significant for chronic compression   deformities of T8-T9 with loss of 50 and 80% of vertebral body height and   evidence of prior kyphoplasty.  Marrow signal intensity within thoracic   vertebral bodies and posterior elements is heterogeneous at T8 and T9.    There is no abnormal vertebral or paraspinal enhancement.  No osseous   expansion, epidural disease or paraspinal abnormality is found.    Thoracic intervertebral discs maintain intact disc heights and signal   intensity.  No central canal or foraminal compromise is recognized. Tiny   RIGHT paracentral disc herniation at T10-11    The thoracic cord maintains intact morphology.   No cord signal intensity   change is seen.  No abnormal enhancement occurs within the thoracic canal.      3.  Lumbar spine:    Lumbar vertebral alignment is shows grade 1 posterior spondylolisthesis   at L2-3 and grade 1 anterior spondylolisthesis at L4-5 on a degenerative   basis. Grade 1 posterior spondylolisthesis at L5-S1 appears to be due to   BILATERAL L5 spondylolysis.  Lumbar vertebral body heights are   maintained.  Marrow signal intensity within lumbar vertebral bodies and   posterior elements is unremarkable.  No osseous expansion, epidural   disease or paraspinal abnormality is found.  No pathologic vertebral or   soft tissue enhancement is found.    Lumbar intervertebral discs show moderate to severe degenerative disc   disease and spondylosis at L2-3 through L5-S1 with loss of disc height   and associated degenerative endplate changes. Disc bulges at L2-3 through   L5-S1 flatten the ventral thecal sac and narrow the BILATERAL neural   foramina. Moderate to severe central stenosis at L3-4 and L4-5 on a   degenerative basis due to disc bulge, facet osteophytic hypertrophy and  redundancy of ligamentum flavum. Marked facet osteophytic hypertrophy   also noted at L5-S1.    The distal cord maintains intact morphology and signal intensity.  The   conus is normally positioned at L1.  Nerve roots of the cauda equina   appear intact.  No abnormal enhancement occurs within the canal.      CT angio head/neck  No evidence of acute infarction. Bilateral cerebellar   remote infarctions. Ischemic white matter disease lower range typical for   age. Atrophy typical for age. Small pericallosal lipoma.

## 2022-11-01 NOTE — PROGRESS NOTE ADULT - SUBJECTIVE AND OBJECTIVE BOX
CHIEF COMPLAINT:    HPI:  HPI: The patient is an 89-year-old female with hx of  MV repair . PAF  unsteady gait ,prior TIAs  fall prior ICH  no on anticoagulation , who was sent to the emergency room from [facility] because she developed right hand to finger paralysis.  The patient's symptoms started last night and she did not want to come to the ER last night waiting for her symptoms to improve in the morning.  After her symptoms did not improve it was referred to the emergency room. The patient was diagnosed with Covid 19 infection a month ago.    Patient  denies any chest pain or shortness of breath or dizziness , patient blood work showed elevated troponin  without uptrending ,   her blood pressure was normal          . The patient was recently diagnosed with right breast mass and biopsy was recommended and scheduled as an outpatient.  The patient is also complaining of a Baker's cyst in the right knee which she wants to be evaluated for by orthopedics.    PMHx: Mitral valve repair  PAF  off anticoagulation due to fall with ICH hemorrhage more than one year ago , Recently diagnosed right breast mass, TIA ×4, rheumatoid arthritis, lumbar spinal stenosis, HLD, hypothyroidism,    PSHx: Mitral valve repair     Family Hx: Father  from kidney failure at age 99, patient mother had DJD    Social Hx.: not smoking, no alcohol, Moved into [facility] 6 months ago from [place].    10/31/22 awake alert feeling well Tele SR   22: Sitting in chair at bedside, offers no complaints of cp or sob.  Unable to move fingers in right hand; Tele: sinus rhythm with 5 beat VT overnight    Allergies    cinnamon (Other)  Drug Allergies Not Recorded    Intolerances      MEDICATIONS  (STANDING):  aspirin enteric coated 81 milliGRAM(s) Oral daily  calcium carbonate 1250 mG  + Vitamin D (OsCal 500 + D) 1 Tablet(s) Oral daily  heparin   Injectable 5000 Unit(s) SubCutaneous every 8 hours  levothyroxine 100 MICROGram(s) Oral daily  metoprolol succinate ER 25 milliGRAM(s) Oral daily  multivitamin/minerals 1 Tablet(s) Oral daily  pravastatin 40 milliGRAM(s) Oral at bedtime  sucralfate 1 Gram(s) Oral two times a day    MEDICATIONS  (PRN):  acetaminophen     Tablet .. 650 milliGRAM(s) Oral every 6 hours PRN Temp greater or equal to 38C (100.4F), Mild Pain (1 - 3)  aluminum hydroxide/magnesium hydroxide/simethicone Suspension 30 milliLiter(s) Oral every 4 hours PRN Dyspepsia  artificial  tears Solution 1 Drop(s) Both EYES two times a day PRN Dry Eyes  melatonin 3 milliGRAM(s) Oral at bedtime PRN Insomnia  ondansetron Injectable 4 milliGRAM(s) IV Push every 8 hours PRN Nausea and/or Vomiting    Vital Signs Last 24 Hrs  T(C): 36.5 (2022 08:30), Max: 36.8 (31 Oct 2022 20:12)  T(F): 97.7 (2022 08:30), Max: 98.2 (31 Oct 2022 20:12)  HR: 69 (2022 08:30) (69 - 75)  BP: 107/83 (2022 08:30) (107/83 - 128/50)  BP(mean): --  RR: 18 (2022 08:30) (18 - 18)  SpO2: 97% (2022 08:30) (96% - 97%)    Parameters below as of 2022 08:30  Patient On (Oxygen Delivery Method): room air            I&O's Summary      PHYSICAL EXAM:    Constitutional: NAD, awake and alert, well-developed  HEENT: PERR, EOMI,  No oral cyananosis.  Neck:  supple,  No JVD  Respiratory: Breath sounds are clear bilaterally  Cardiovascular: S1 and S2, RRR + Sys Murmur   Gastrointestinal: Abd soft, nontender.   Extremities: No LE Edema  Neurological: A/O x 3, unsteady gait   Musculoskeletal: arthritic changes , unsteady gait  no calf tenderness.  Skin: No rashes.      LABS: All Labs Reviewed:                          9.8    5.33  )-----------( 245      ( 31 Oct 2022 06:55 )             30.5                           9.9    4.69  )-----------( 230      ( 30 Oct 2022 06:52 )             32.0                         10.6   6.39  )-----------( 281      ( 29 Oct 2022 13:23 )             32.5     10-    138  |  106  |  22  ----------------------------<  93  3.9   |  26  |  0.60    Ca    8.7      31 Oct 2022 06:55        30 Oct 2022 06:52    136    |  104    |  24     ----------------------------<  100    4.2     |  27     |  0.65   29 Oct 2022 13:23    135    |  104    |  27     ----------------------------<  80     4.3     |  26     |  0.67     Ca    8.9        30 Oct 2022 06:52  Ca    9.2        29 Oct 2022 13:23    TPro  7.8    /  Alb  3.3    /  TBili  0.3    /  DBili  x      /  AST  18     /  ALT  23     /  AlkPhos  56     29 Oct 2022 13:23    PT/INR - ( 29 Oct 2022 13:23 )   PT: 11.3 sec;   INR: 0.97 ratio         PTT - ( 29 Oct 2022 13:23 )  PTT:27.5 sec    - TroponinI hsT: <-245.03, <-256.37    Blood Culture:     10-30 @ 06:52  TSH: 2.33      RADIOLOGY/EKG:  < from: 12 Lead ECG (10.29.22 @ 14:07) >  inus rhythm with occasional Premature ventricular complexes  Non-specific intra-ventricular conduction block  Abnormal ECG  No previous ECGs available  Confirmed by MD ARJUN, SARAN ([id number]) on 10/30/2022 8:47:28 AM    < end of copied text >    Monitor sinus rhythm PVCS    < from: TTE Echo Complete w/o Contrast w/ Doppler (22 @ 08:21) >   Impression     Summary     Significant fibrocalcific changes noted to the aortic valve leaflets with   restriction in leaflet excursion. Calculated LAURA is .85cm2 ; this finding   is consistent with severe aortic stenosis.   The left atrium is moderately dilated.   Mild concentric left ventricular hypertrophy is present.   Moderately reduced left ventricular systolic function with inferior wall   akinesis.   Estimated left ventricular ejection fraction is 40-45 %.   The IVC appears normal.   Moderate (2+) mitral regurgitation is present.   Mild mitral annular calcification is present.   Fibrocalcific changes noted to the mitral valve repair-posterior leaflet   with reduced leaflet excursion.   No evidence of pericardial effusion.   No evidence of pleural effusion.  Normal appearing pulmonic valve structure and function.   Normal appearing right atrium.   Normal appearing right ventricle structure and function.   Mild (1+) tricuspid valve regurgitation is present.     Signature     ----------------------------------------------------------------   Electronically signed by Radha Romero MD(Interpreting   physician) on 2022 10:05 AM    < end of copied text >

## 2022-11-01 NOTE — PROGRESS NOTE ADULT - PROBLEM SELECTOR PLAN 1
possible CVA vs TIA , with severe osteoarthritic changes of hands   patient has prior hx TIA . Not on AC due to traumatic ICB while AC with falls   MRI brain no acute stroke, evidence of prior infarcts,  ? of a metastatic lesion in thoracic spine seen on chest Xray.  Neuro input appreciated

## 2022-11-01 NOTE — CONSULT NOTE ADULT - ASSESSMENT
89 y/o female with h/o recently diagnosed right breat mass, prior TIA, RA, lumbar spine stenosis, HL, hypothyroidism, recent COVID was admitted on 10/29 from [facility] living [facility] for right hand fingers paralysis. The patient's symptoms started the night PTA. After her symptoms did not improve she was referred to the emergency room. The patient was recently diagnosed with right breast mass and biopsy was recommended and was scheduled as an outpatient. On 10/31 she underwent MRI of cervical spine and was reported with a small rim enhancing collection posterior to the C6 spinous process measuring 1.4 x 1.6 x 1 cm. The question was raised for possible soft tissue lesion or abscess.       1. Possible C6 spinous process collection ?possible abscess.     88 y/o female with h/o recently diagnosed right breat mass, prior TIA, RA, lumbar spine stenosis, HL, hypothyroidism, recent COVID was admitted on 10/29 from [facility] living [facility] for right hand fingers paralysis. The patient's symptoms started the night PTA. After her symptoms did not improve she was referred to the emergency room. The patient was recently diagnosed with right breast mass and biopsy was recommended and was scheduled as an outpatient. On 10/31 she underwent MRI of cervical spine and was reported with a small rim enhancing collection posterior to the C6 spinous process measuring 1.4 x 1.6 x 1 cm. The question was raised for possible soft tissue lesion or abscess.       1. Possible C6 spinous process collection ?possible abscess. Right breast mass ?malignancy.   -no clinical signs of sepsis  -no C6 spine area tenderness or erythema  -cannot exclude an infectious process in matthew of MRI finding  -would suggest tissue biopsy for pathology and culture  -observe off abx at present time  -old chart reviewed to assess prior cultures  -monitor temps  -f/u CBC  -supportive care  2. Other issues:   -care per medicine    d/w Dr. Waldrop

## 2022-11-01 NOTE — PROGRESS NOTE ADULT - ASSESSMENT
89F hx MV repair, pAF, unsteady gait, 4 prior TIAs, fall prior w ICH, not on full a/c, R  breast mass, RA, lumbar spinal stenosis, HLD, hypothyroid, p/w R wrist weakness.    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  -tele  -A1c 5.2, lipids checked  -TSH wnl  -CTH neg  -MRI brain, MRA H/N neg for CVA, mets, vascular abnlities  -MRI brachial plexus w chronic findings  -ASA, statin  -passed dysphagia screen  -f/u neuro recs  -PT/OT eval    #?thoracic spine lesion on CXR, has breast mass (not pursuing bx)  -no osseous met seen there on MRI t spine    #rim enhancing lesion at C6 on CT c spine  -?abscess  -f/u ortho spine recs, ID recs  -CRP nl ESR 47  -?sampling, ID thinks should sample to ensure not abscess, ortho spine eval'ing feasability, otherwise, would need to c/s IR    #Elevated trop, ?demand ischemia  -tele  -trops downtrended  -A1c, 5.2, lipids checked  -echo w SWMA, EF 40% and severe AS  -awaiting cards recs- stress vs cath, NPO pMN in preparation  -ASA, statin, BB  -f/u cards recs    #afib  -BB  -not on full a/c    #DVT ppx- SQH    #from [facility] DISLA, planned for Yordan Rainey, [phone], updated daily

## 2022-11-02 DIAGNOSIS — M54.2 CERVICALGIA: ICD-10-CM

## 2022-11-02 LAB
ANION GAP SERPL CALC-SCNC: 6 MMOL/L — SIGNIFICANT CHANGE UP (ref 5–17)
BUN SERPL-MCNC: 23 MG/DL — SIGNIFICANT CHANGE UP (ref 7–23)
CALCIUM SERPL-MCNC: 8.5 MG/DL — SIGNIFICANT CHANGE UP (ref 8.5–10.1)
CHLORIDE SERPL-SCNC: 107 MMOL/L — SIGNIFICANT CHANGE UP (ref 96–108)
CO2 SERPL-SCNC: 25 MMOL/L — SIGNIFICANT CHANGE UP (ref 22–31)
CREAT SERPL-MCNC: 0.67 MG/DL — SIGNIFICANT CHANGE UP (ref 0.5–1.3)
EGFR: 84 ML/MIN/1.73M2 — SIGNIFICANT CHANGE UP
GLUCOSE SERPL-MCNC: 105 MG/DL — HIGH (ref 70–99)
HCT VFR BLD CALC: 30.8 % — LOW (ref 34.5–45)
HGB BLD-MCNC: 9.9 G/DL — LOW (ref 11.5–15.5)
MCHC RBC-ENTMCNC: 29.2 PG — SIGNIFICANT CHANGE UP (ref 27–34)
MCHC RBC-ENTMCNC: 32.1 GM/DL — SIGNIFICANT CHANGE UP (ref 32–36)
MCV RBC AUTO: 90.9 FL — SIGNIFICANT CHANGE UP (ref 80–100)
PLATELET # BLD AUTO: 241 K/UL — SIGNIFICANT CHANGE UP (ref 150–400)
POTASSIUM SERPL-MCNC: 4.1 MMOL/L — SIGNIFICANT CHANGE UP (ref 3.5–5.3)
POTASSIUM SERPL-SCNC: 4.1 MMOL/L — SIGNIFICANT CHANGE UP (ref 3.5–5.3)
RBC # BLD: 3.39 M/UL — LOW (ref 3.8–5.2)
RBC # FLD: 12.7 % — SIGNIFICANT CHANGE UP (ref 10.3–14.5)
SODIUM SERPL-SCNC: 138 MMOL/L — SIGNIFICANT CHANGE UP (ref 135–145)
WBC # BLD: 5.61 K/UL — SIGNIFICANT CHANGE UP (ref 3.8–10.5)
WBC # FLD AUTO: 5.61 K/UL — SIGNIFICANT CHANGE UP (ref 3.8–10.5)

## 2022-11-02 PROCEDURE — 99233 SBSQ HOSP IP/OBS HIGH 50: CPT

## 2022-11-02 PROCEDURE — 99232 SBSQ HOSP IP/OBS MODERATE 35: CPT

## 2022-11-02 PROCEDURE — 99221 1ST HOSP IP/OBS SF/LOW 40: CPT

## 2022-11-02 RX ADMIN — Medication 1 GRAM(S): at 11:20

## 2022-11-02 RX ADMIN — Medication 25 MILLIGRAM(S): at 11:22

## 2022-11-02 RX ADMIN — Medication 81 MILLIGRAM(S): at 11:19

## 2022-11-02 RX ADMIN — Medication 40 MILLIGRAM(S): at 21:16

## 2022-11-02 RX ADMIN — Medication 100 MICROGRAM(S): at 06:22

## 2022-11-02 RX ADMIN — Medication 1 TABLET(S): at 11:23

## 2022-11-02 RX ADMIN — Medication 1 TABLET(S): at 11:21

## 2022-11-02 RX ADMIN — Medication 1 GRAM(S): at 21:16

## 2022-11-02 RX ADMIN — HEPARIN SODIUM 5000 UNIT(S): 5000 INJECTION INTRAVENOUS; SUBCUTANEOUS at 21:15

## 2022-11-02 RX ADMIN — HEPARIN SODIUM 5000 UNIT(S): 5000 INJECTION INTRAVENOUS; SUBCUTANEOUS at 06:22

## 2022-11-02 NOTE — PROGRESS NOTE ADULT - ASSESSMENT
88 y/o female with h/o recently diagnosed right breat mass, prior TIA, RA, lumbar spine stenosis, HL, hypothyroidism, recent COVID was admitted on 10/29 from [facility] living [facility] for right hand fingers paralysis. The patient's symptoms started the night PTA. After her symptoms did not improve she was referred to the emergency room. The patient was recently diagnosed with right breast mass and biopsy was recommended and was scheduled as an outpatient. On 10/31 she underwent MRI of cervical spine and was reported with a small rim enhancing collection posterior to the C6 spinous process measuring 1.4 x 1.6 x 1 cm. The question was raised for possible soft tissue lesion or abscess.       1. Possible C6 spinous process collection ?possible abscess. Right breast mass ?malignancy.   -no clinical signs of sepsis  -no C6 spine area tenderness or erythema  -cannot exclude an infectious process in matthew of MRI finding  -case reviewed with Dr. Jean Baptiste - unlikely infectious process  -would consider tissue biopsy for pathology and culture by IR for further diagnosis  -continue to observe off abx at present time  -monitor temps  -f/u CBC  -supportive care  2. Other issues:   -care per medicine    d/w medical team

## 2022-11-02 NOTE — OCCUPATIONAL THERAPY INITIAL EVALUATION ADULT - GENERAL OBSERVATIONS, REHAB EVAL
Patient rec'd/left sitting in bedside chair, chair alarmed, CB/phone/table IR, +HM, +locked IV, agreeable to OT IE, VSS (  ). Patient rec'd/left sitting in bedside chair, chair alarmed, CB/phone/table IR, +HM, +locked IV, agreeable to OT IE, VSS (99/56, 97%, 88).

## 2022-11-02 NOTE — OCCUPATIONAL THERAPY INITIAL EVALUATION ADULT - TRANSFER TRAINING, PT EVAL
Patient will complete toilet/commode transfers using necessary DME with contact guard assistance upon d/c from acute OT program

## 2022-11-02 NOTE — PROGRESS NOTE ADULT - SUBJECTIVE AND OBJECTIVE BOX
HPI:  HPI: The patient is an 89-year-old female who was sent to the emergency room from [facility] because she developed right hand to finger paralysis.  The patient's symptoms started last night and she did not want to come to the ER last night waiting for her symptoms to improve in the morning.  After her symptoms did not improve it was referred to the emergency room. The patient was diagnosed with Covid 19 infection a month ago. The patient was recently diagnosed with right breast mass and biopsy was recommended and scheduled as an outpatient.  The patient is also complaining of a Baker's cyst in the right knee which she wants to be evaluated for by orthopedics.    Following her admission and on further testing, patient found to have enhancement in the posterior cervical spine-etiology unclear. Asked in consult accordingly    22 Patient with mold neck pain with change in position  22 Patient evaluated with Dr. Jean Baptiste and has persistent weakness R wrist/hand-has usual neck pain    PMHx: Recently diagnosed right breast mass,TIA ×4, rheumatoid arthritis, lumbar spinal stenosis, HLD, hypothyroidism,    PSHx: Denies any major surgeries    MEDICATIONS  (STANDING):  aspirin enteric coated 81 milliGRAM(s) Oral daily  calcium carbonate 1250 mG  + Vitamin D (OsCal 500 + D) 1 Tablet(s) Oral daily  heparin   Injectable 5000 Unit(s) SubCutaneous every 8 hours  levothyroxine 100 MICROGram(s) Oral daily  metoprolol succinate ER 25 milliGRAM(s) Oral daily  multivitamin/minerals 1 Tablet(s) Oral daily  pravastatin 40 milliGRAM(s) Oral at bedtime  sucralfate 1 Gram(s) Oral two times a day    MEDICATIONS  (PRN):  acetaminophen     Tablet .. 650 milliGRAM(s) Oral every 6 hours PRN Temp greater or equal to 38C (100.4F), Mild Pain (1 - 3)  aluminum hydroxide/magnesium hydroxide/simethicone Suspension 30 milliLiter(s) Oral every 4 hours PRN Dyspepsia  artificial  tears Solution 1 Drop(s) Both EYES two times a day PRN Dry Eyes  melatonin 3 milliGRAM(s) Oral at bedtime PRN Insomnia  ondansetron Injectable 4 milliGRAM(s) IV Push every 8 hours PRN Nausea and/or Vomiting    Allergies    aspirin (Unknown)  cinnamon (Other)  Flomax (Unknown)  tramadol (Unknown)    Intolerances    Family Hx: Father  from kidney failure at age 99, patient mother had DJD    Social Hx.: not smoking, no alcohol, Moved into [facility] assisted living 6 months ago from [place].    ROS c/w HPI/PMH    PE:    Vital Signs Last 24 Hrs  T(C): 36.4 (2022 08:41), Max: 36.4 (2022 20:20)  T(F): 97.5 (2022 08:41), Max: 97.6 (2022 20:20)  HR: 70 (2022 08:41) (70 - 80)  BP: 130/59 (2022 08:41) (113/60 - 177/56)  BP(mean): 72 (2022 01:40) (72 - 82)  RR: 18 (2022 08:41) (18 - 19)  SpO2: 100% (2022 08:41) (96% - 100%)    Parameters below as of 2022 08:41  Patient On (Oxygen Delivery Method): room air    Patient sitting in bedside chair c/o intermittent neck pain with change in position and R wrist weakness  Neck supple, mild tenderness over SCM bilaterally, no posterior tenderness or masses palpable, flex/ext with mild pain, no adenopathy  Extremities with R knee pain with PROM, no other joint swelling or tenderness, mild edema LEs, no cyanosis or clubbing  T-L spine without tenderness, negative SLR  Neuro-A&OX3, C2-10 intact, (+) weakness RUE: 4/5 deltoid, 4+/5 R biceps, 0/5 R wrist ext and MP ext, 4/5 R finger flexors: no weakness LUE or B/L LEs    LABS                        9.8    5.33  )-----------( 245      ( 31 Oct 2022 06:55 )             30.5     10-    138  |  106  |  22  ----------------------------<  93  3.9   |  26  |  0.60    Ca    8.7      31 Oct 2022 06:55    Sedimentation Rate, Erythrocyte (10.31. @ 19:09)    Sedimentation Rate, Erythrocyte: 47 mm/hr    C-Reactive Protein, Serum (10.31. @ 19:09)    C-Reactive Protein, Serum: <3: Test Repeated mg/L    STUDIES  MRI cervical/thoracic/lumbar spine  FINDINGS:   No prior similar studies are available for review.      1.  Cervical spine:    Cervical vertebral alignment is intact.  Cervical vertebral body heights   are maintained.  Marrow signal intensity within cervical vertebral bodies   and posterior elements is unremarkable.  No osseous expansion, epidural   disease found.    Small rim enhancing collection posterior to the C6   spinous process measuring 1.4 x 1.6 x 1 cm question possible soft tissue   lesion or abscess.    Cervical intervertebral discs demonstrate mild disc degeneration at C6/7   with loss of disc height and signals nucleus pulposus. Mild narrowing of   the RIGHT C4-5, moderate narrowing of the RIGHT C5-6 and marked narrowing   of the BILATERAL C6-7 neural foramina due to uncovertebral spurring and   facet osteophytic hypertrophy. No central canal or foraminal compromise   is recognized.    The cervical cord maintains intact morphology.   No cord signal intensity   change is seen.  No abnormal enhancement occurs within the cervical canal.      2.  Thoracic spine:    Thoracic vertebral alignment is significant for focal kyphosis at T8-9.    Thoracic vertebral body heights are significant for chronic compression   deformities of T8-T9 with loss of 50 and 80% of vertebral body height and   evidence of prior kyphoplasty.  Marrow signal intensity within thoracic   vertebral bodies and posterior elements is heterogeneous at T8 and T9.    There is no abnormal vertebral or paraspinal enhancement.  No osseous   expansion, epidural disease or paraspinal abnormality is found.    Thoracic intervertebral discs maintain intact disc heights and signal   intensity.  No central canal or foraminal compromise is recognized. Tiny   RIGHT paracentral disc herniation at T10-11    The thoracic cord maintains intact morphology.   No cord signal intensity   change is seen.  No abnormal enhancement occurs within the thoracic canal.      3.  Lumbar spine:    Lumbar vertebral alignment is shows grade 1 posterior spondylolisthesis   at L2-3 and grade 1 anterior spondylolisthesis at L4-5 on a degenerative   basis. Grade 1 posterior spondylolisthesis at L5-S1 appears to be due to   BILATERAL L5 spondylolysis.  Lumbar vertebral body heights are   maintained.  Marrow signal intensity within lumbar vertebral bodies and   posterior elements is unremarkable.  No osseous expansion, epidural   disease or paraspinal abnormality is found.  No pathologic vertebral or   soft tissue enhancement is found.    Lumbar intervertebral discs show moderate to severe degenerative disc   disease and spondylosis at L2-3 through L5-S1 with loss of disc height   and associated degenerative endplate changes. Disc bulges at L2-3 through   L5-S1 flatten the ventral thecal sac and narrow the BILATERAL neural   foramina. Moderate to severe central stenosis at L3-4 and L4-5 on a   degenerative basis due to disc bulge, facet osteophytic hypertrophy and  redundancy of ligamentum flavum. Marked facet osteophytic hypertrophy   also noted at L5-S1.    The distal cord maintains intact morphology and signal intensity.  The   conus is normally positioned at L1.  Nerve roots of the cauda equina   appear intact.  No abnormal enhancement occurs within the canal.      CT angio head/neck  No evidence of acute infarction. Bilateral cerebellar   remote infarctions. Ischemic white matter disease lower range typical for   age. Atrophy typical for age. Small pericallosal lipoma.

## 2022-11-02 NOTE — PROGRESS NOTE ADULT - ASSESSMENT
The patient is an 89-year-old female who was sent to the emergency room from [facility] because she developed right hand to finger paralysis.  The patient's symptoms started last night and she did not want to come to the ER last night waiting for her symptoms to improve in the morning.  After her symptoms did not improve it was referred to the emergency room. The patient was diagnosed with Covid 19 infection a month ago. The patient was recently diagnosed with right breast mass and biopsy was recommended and scheduled as an outpatient.  The patient is also complaining of a Baker's cyst in the right knee which she wants to be evaluated for by orthopedics.    Following her admission and on further testing, patient found to have enhancement in the posterior cervical spine-etiology unclear. Asked in consult accordingly    IMP:  Acute RUE weakness-?etiology?-probable radial nerve palsy  Small rim enhancing collection posterior to the C6 spinous process measuring 1.4 x 1.6 x 1 cm question possible soft tissue lesion or abscess.-?etiology?    PLAN:  Patient admitted to Medicine  Medical management  Neurology notes appreciated  Normal ESR/CRP  ID felt that no further studies indicated but recommended biopsy.  Dr. Jean Baptiste feels that biopsy should be obtained percutaneously if warranted  Also recommends Ortho Hand consult for radial nerve palsy  PT

## 2022-11-02 NOTE — CONSULT NOTE ADULT - REASON FOR ADMISSION
Right wrist paralysis r/o CVA

## 2022-11-02 NOTE — OCCUPATIONAL THERAPY INITIAL EVALUATION ADULT - RANGE OF MOTION EXAMINATION
MONAEE AROM: WFL, RUE: WFL at shoulder, elbow, hand, wrist 1/2 range AROM (patient reports improvements since yesterday)

## 2022-11-02 NOTE — OCCUPATIONAL THERAPY INITIAL EVALUATION ADULT - PRECAUTIONS/LIMITATIONS, REHAB EVAL
diet: DASh/TLC- NPO after midnight, notify if SPO2 <92% diet: DASH/TLC- NPO after midnight, notify if SPO2 <92%

## 2022-11-02 NOTE — PROGRESS NOTE ADULT - SUBJECTIVE AND OBJECTIVE BOX
Chief Complaint: R wrist weakness    Interval Hx: Patient seen and examined earlier today. No acute complaints at this time. Patient reports overall improvement in R wrist strength and function since admission, has been working with OT, now able to write better. Etiology remains unclear, suspect peripheral nerve injury. She did undergo cardiac cath today for further evaluation of her abnormal TTE findings, specifically, severe AS and reduced LVEF 40%. Cath today with non occlusive CAD. Recommended medical management. Regarding her severe AS, advised outpatient follow up with Dr. Ramirez to further discuss her structural heart disease, scheduled for 11/16. As for the finding of a small possible collection on C6 spinous process, ID is recommending IR evaluation  for possible tissue biospy for culture and pathology. IR consulted and input pending. Lastly, she has ongoing physical deconditioning and debility. Anticipated that she may need THERESA at discharge and she would be in agreement with such    ROS: All other systems reviewed and found to be negative with the exception of what has been described above.    Vitals:  T(F): 98.5 (02 Nov 2022 19:06), Max: 98.5 (02 Nov 2022 19:06)  HR: 77 (02 Nov 2022 19:06) (66 - 88)  BP: 109/55 (02 Nov 2022 19:06) (97/85 - 177/56)  RR: 18 (02 Nov 2022 19:06) (16 - 18)  SpO2: 93% (02 Nov 2022 19:06) (93% - 100%) on RA    Exam:  Gen: Comfortable appearing  HEENT: NCAT PERRL EOMI MMM clear oropharynx  Neck: Supple, no carotid bruits, no palpable lymph nodes, no thyromegaly  CVS: +S1, +S2, regular, no murmurs or rubs  Chest: Lungs clear to auscultation bilaterally, no wheezing, rales, rhonchi, good air entry bilaterally  Abd: +BS, soft, non-tender, non-distended, no palpable masses  Ext: No edema or calf tenderness, no clubbing or cyanosis, DP pulses intact B/L, R wrist in splint, wiggles fingers, normal cap refill  Skin: Warm dry  Neuro: AAOX3, appropriately interactive, normal affect, speech fluent, no aphasia or paraphasic errors, naming /repetition intact, CN intact, +weakness in R wrist w/ extension, subtle weakness R brachioradialis, able to extend fingers on right, normal finger flexion, wrist flexion. Decreased  on right. Full strength in LUE. No focal weakness in lower extremities, sens intact to light touch, reflexes symmetric and normal, downgoing toes B/L, no FNFA, dysmetria, PAUL intact     Labs:                      9.9    5.61 )-----------( 241             30.8       138  |  107  |  23  ---------------------<  105  4.1   |  25  |  0.67    Ca 8.5    Imaging:  MRI R brachial plexus 11/1: Grossly unremarkable right brachial plexus. Moderate levoscoliosis of the cervical spine. Severe right facet arthropathy at C3-4, C5-6 and C7-T1 with marked associated edema and enhancement at C3-C4, and possible early ankylosis at C5-6 and C7-T1.    MRI complete spine 10/31: No evidence of osseous metastases. Cervical spine w/ small rim enhancing collection posterior to the C6 spinous process measuring 1.4 x 1.6 x 1 cm question possible, soft tissue lesion or abscess. No evidence of osseous metastases. Mild disc degeneration at C6/7 with loss of disc height and signals nucleus pulposus. Mild narrowing of the R C4-5, moderate narrowing of the R C5-6 and marked narrowing of the B/L C6-7 neural foramina due to uncovertebral spurring and facet osteophytic hypertrophy. Thoracic spine w/ focal kyphosis at T8-9. Thoracic vertebral body heights are significant for chronic compression deformities of T8-T9 with loss of 50 and 80% of vertebral body height and evidence of prior kyphoplasty. Tiny R paracentral disc herniation at T9-T10. Lumbar spine  grade 1 posterior spondylolisthesis at L2-3 and grade 1 anterior spondylolisthesis at L4-5 on a degenerative basis. Grade 1 posterior spondylolisthesis at L5-S1 appears to be due to B/L L5 spondylolysis. Moderate to severe degenerative disc disease and spondylosis at L2-3 through L5-S1 with loss of disc height and associated degenerative endplate changes. Disc bulges at L2-3 through L5-S1 flatten the ventral thecal sac and narrow the B/L neural foramina. Moderate to severe central stenosis at L3-4 and L4-5 on a degenerative basis due to disc bulge, facet osteophytic hypertrophy and redundancy of ligamentum flavum. Marked facet osteophytic hypertrophy also noted at L5-S1.    MRA head and neck 10/31: Carotid neck circulation intact. Vertebral neck circulation intact. Anterior intracranial circulation with atherosclerosis cavernous carotid segments of the internal carotid arteries. Posterior intracranial circulation intact.     MRI head w/wo contrast 10/31: No evidence of acute infarction. Bilateral cerebellar remote infarctions. Ischemic white matter disease lower range typical for age. Atrophy typical for age. Small pericallosal lipoma.    CT C spine wo 10/29: No acute cervical spine fracture or traumatic malalignment. Multilevel cervical spondylosis.    CT head wo 10/29: No acute intracranial hemorrhage or mass affect. Chronic small vessel ischemic changes and lacunar infarcts.     CXR 10/29: Question blastic metastatic disease of a lower thoracic body. No acute lung finding.     R hand xray 10/29/22: No fracture of the right hand.    Cardiac Testing:  Echo 11/1/22: Significant fibrocalcific changes noted to the aortic valve leaflets with restriction in leaflet excursion. Calculated LAURA is .85cm2 ; this finding is consistent with severe aortic stenosis. The left atrium is moderately dilated. Mild concentric left ventricular hypertrophy is present. Moderately reduced left ventricular systolic function with inferior wall akinesis. Estimated left ventricular ejection fraction is 40-45 %. The IVC appears normal. Moderate (2+) mitral regurgitation is present. Mild mitral annular calcification is present. Fibrocalcific changes noted to the mitral valve repair-posterior leaflet with reduced leaflet excursion. No evidence of pericardial effusion. No evidence of pleural effusion. Normal appearing pulmonic valve structure and function. Normal appearing right atrium. Normal appearing right ventricle structure and function. Mild (1+) tricuspid valve regurgitation is present.    Tele 11/1: Sinus 60-80, 5 beats aberrancy triplet    Tele 10/31: Sinus 70-90, PVCs, couplets    Tele 10/30: Sinus 60-80, PVCs, couplets    Meds:  MEDICATIONS  (STANDING):  aspirin enteric coated 81 milliGRAM(s) Oral daily  calcium carbonate 1250 mG  + Vitamin D (OsCal 500 + D) 1 Tablet(s) Oral daily  heparin   Injectable 5000 Unit(s) SubCutaneous every 8 hours  levothyroxine 100 MICROGram(s) Oral daily  metoprolol succinate ER 25 milliGRAM(s) Oral daily  multivitamin/minerals 1 Tablet(s) Oral daily  pravastatin 40 milliGRAM(s) Oral at bedtime  sucralfate 1 Gram(s) Oral two times a day    MEDICATIONS  (PRN):  acetaminophen     Tablet .. 650 milliGRAM(s) Oral every 6 hours PRN Temp greater or equal to 38C (100.4F), Mild Pain (1 - 3)  aluminum hydroxide/magnesium hydroxide/simethicone Suspension 30 milliLiter(s) Oral every 4 hours PRN Dyspepsia  artificial  tears Solution 1 Drop(s) Both EYES two times a day PRN Dry Eyes  melatonin 3 milliGRAM(s) Oral at bedtime PRN Insomnia  ondansetron Injectable 4 milliGRAM(s) IV Push every 8 hours PRN Nausea and/or Vomiting

## 2022-11-02 NOTE — CHART NOTE - NSCHARTNOTEFT_GEN_A_CORE
Pt presents for f/u R TM perforation, seems better but still occasional sensation blockage and pressure. No other aggravating or alleviating factors noted.    On exam, R TM perforation healed.    Assessment and Plan: TM normal bilat, + Eustachian tube dysfunction, can try OTC allergy meds with decongestant, f/u prn.   89-year-old female with hx of recent + COVID a month ago, MV repair, PAF (not on anticoagulation due to fall/ ICH), unsteady gait, prior TIAs  fall prior ICH,  right breast mass and scheduled outpt biopsy who was sent to the emergency room from [facility] because she developed right hand to finger paralysis. Noted to have elevated Troponin ~200, severe AS (0.85 cm2) on echo.   Pt brought to cath lab for cardiac cath with possible PCI.   Seen and examined Pt in holding.   VSS, A+O x4, denies chest pain, sob or palpitation at this time.   - Procedure risks, alternatives, benefits and potential complications were discussed in detail. Risks include but not limited to bleeding, infection, allergy, renal failure requiring dialysis, stroke, vascular injury, pericardial tamponade, arrhythmias, MI and even death.   - Informed consent obtained and Pt verbalizes and understands preprocedure instructions.      ASA class: III   Cr: 0.67  GFR: 84  Bleeding risk: 6.0%   Dileep score: 8 points

## 2022-11-02 NOTE — OCCUPATIONAL THERAPY INITIAL EVALUATION ADULT - MD ORDER
MD orders received. Chart reviewed, contents noted, conferred with RN MD orders received. Chart reviewed, contents noted, conferred with ROBERT Clark.

## 2022-11-02 NOTE — PROGRESS NOTE ADULT - ASSESSMENT
Ms. Alvarado is an 89 yo woman with an acute onset of a right wrist drop.  She reports that symptoms started suddenly while she was awake.  The differential diagnosis includes stroke affecting right hand cortical representation, metastatic lesion, radial neuropathy, brachial plexopathy.  There is a question of a metastatic lesion in thoracic spine seen on chest Xray.      -MRI brain does not show any acute stroke.   -Old infarcts seen on MRI brain. Now on aspirin.   -No metastatic disease seen in spine or brachial plexus.  No pathology to suggest wrist/hand weakness.  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  -Ortho and OT evals appreciated  -Aspiration of soft tissue lesion at level of cervical spine.    -Workup of elevated troponin as per cardiology.    -DVT prophylaxis      Will f/u if additional input is needed from neurology.

## 2022-11-02 NOTE — OCCUPATIONAL THERAPY INITIAL EVALUATION ADULT - HEALTH SCREEN CRITERIA
--------------- APPROVED REPORT --------------





Date of service: 08/06/2018



EKG Measurement

Heart [id number]

[id number]

[id number]

[id number]



<Conclusion>

Atrial fibrillation with rapid ventricular response

Abnormal ECG yes

## 2022-11-02 NOTE — PROGRESS NOTE ADULT - SUBJECTIVE AND OBJECTIVE BOX
Nurse Practitioner Progress note:     HPI:89-year-old female with hx of recent + COVID a month ago, MV repair, PAF (not on anticoagulation due to fall/ ICH), unsteady gait, prior TIAs  fall prior ICH,  right breast mass and scheduled outpt biopsy who was sent to the emergency room from [facility] because she developed right hand to finger paralysis. Noted to have elevated Troponin ~200, severe AS (0.85 cm2) on echo.         Vital Signs  T(C): 36.5 (11-02-22 @ 13:30), Max: 36.7 (11-02-22 @ 09:30)  HR: 72 (11-02-22 @ 13:30) (66 - 88)  BP: 114/68 (11-02-22 @ 13:30) (97/85 - 177/56)  RR: 18 (11-02-22 @ 13:30) (18 - 19)  SpO2: 97% (11-02-22 @ 13:30) (96% - 100%)  Wt(kg): --        PHYSICAL EXAM:  NEURO: Non-focal, AxOx3.  No neuro deficits   CHEST/LUNG: Clear to auscultation bilaterally; No wheeze  HEART: s1 s2 Regular rate and rhythm; No murmurs, rubs, or gallops  ABDOMEN: Soft, Nontender, Nondistended; Bowel sounds present X 4 quadrants   EXTREMITIES:  2+ Peripheral Pulses, No clubbing, cyanosis, or edema   VASCULAR: Peripheral pulses palpable 2+ bilaterally  PROCEDURE SITE: RFA accessed. sheath to be removed in RR.  Site is without hematoma or bleeding. Sensation and DEJUAN intact. Distal pulses palpable 2+, capillary refill < 2 seconds. Patient denies pain, numbness, tingling, CP or SOB. Clean dry dressing applied     PROCEDURE RESULTS: full report to follow     ASSESSMENT:  89-year-old female with hx of recent + COVID a month ago, MV repair, PAF (not on anticoagulation due to fall/ ICH), unsteady gait, prior TIAs  fall prior ICH,  right breast mass and scheduled outpt biopsy who was sent to the emergency room from [facility] because she developed right hand to finger paralysis. Noted to have elevated Troponin ~200, severe AS (0.85 cm2) on echo.      (29 Oct 2022 16:56)    s/p LHC revealing non obstructive disease       PLAN:  -VS, diet, activity as per post cath orders  - SIOBHAN excluded   -Encourage PO fluids  -Continue current medications  -Post cath instructions reviewed, patient verbalizes and understands instructions  -Discussed therapeutic lifestyle changes to reduce risk factors such as following a cardiac diet, weight loss, maintaining a healthy weight, exercise, smoking cessation, medication compliance, and regular follow-up  with PCP/Cardioloigst  -patient has follow apt with Dr. Ramirez 11/16/22 @ 1030am for structural heart disease   -Plan of care discussed with patient, RN and Dr. Ramirez  - rest of care per primary team

## 2022-11-02 NOTE — PACU DISCHARGE NOTE - COMMENTS
Verbal report given to Sandi on [address]. Right groin with no s/s of bleeding or hematoma.  Transported back to room.

## 2022-11-02 NOTE — OCCUPATIONAL THERAPY INITIAL EVALUATION ADULT - NSACTIVITYREC_GEN_A_OT
Patient provided with handout going over radial nerve exercises for R wrist, recommend completion of exercises daily as outlined on protocol.

## 2022-11-02 NOTE — CONSULT NOTE ADULT - CONSULT REASON
right wrist drop
Possible infection or lesion cervical spine
Right wrist drop    Consult called ~1030  Pt evaluated ~1115
possible C-spine abscess
elevated troponin

## 2022-11-02 NOTE — PROGRESS NOTE ADULT - ASSESSMENT
89 year old woman with hx of mitral valve repair, paroxysmal atrial fibrillation, hx of TIA, chronic unsteady gait, hx of fall, hx of intracranial hemorrhage, no longer on AC, lumbar degenerative changes, spinal stenosis, and R breast mass for which she declined biopsy (given that she would decline surgery, chemo etc if found to be cancer), presented for further evaluation of R wrist weakness, no other focal complaints. R wrist weakness did start suddenly, while awake. She did on ROS also note chronic, progressively worsening generalized weakness over a period of months. No other complaints. In the ED, CT head was negative for hemorrhage, negative for acute territorial infarction. Placed on tele and admitted to medicine for further management.     R wrist weakness  Symptoms started suddenly while she was awake. DDx included stroke affecting right hand cortical representation, metastatic lesion, radial neuropathy, brachial plexopathy. Appreciate input from Neurology. MRI brain does not show any acute stroke. Old infarcts seen on MRI brain. Now on aspirin. No metastatic disease seen in spine or brachial plexus. No pathology to suggest wrist/hand weakness. 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 Ortho and OT evals appreciated.   - Continue restorative PT/OT sessions  - Continue wrist splint    Possible incidental soft tissue lesion vs collection at the level of the C spine adjacent to C6 spinous process  Appreciate input from ID who suggested IR evaluation for possible aspiration.    -Aspiration of soft tissue lesion at level of cervical spine.        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  -tele  -A1c 5.2, lipids checked  -TSH wnl  -CTH neg  -MRI brain, MRA H/N neg for CVA, mets, vascular abnlities  -MRI brachial plexus w chronic findings  -ASA, statin  -passed dysphagia screen  -f/u neuro recs  -PT/OT eval    #?thoracic spine lesion on CXR, has breast mass (not pursuing bx)  -no osseous met seen there on MRI t spine    #rim enhancing lesion at C6 on CT c spine  -?abscess  -f/u ortho spine recs, ID recs  -CRP nl ESR 47  -?sampling, ID thinks should sample to ensure not abscess, ortho spine eval'ing feasability, otherwise, would need to c/s IR    #Elevated trop, ?demand ischemia  -tele  -trops downtrended  -A1c, 5.2, lipids checked  -echo w SWMA, EF 40% and severe AS  -awaiting cards recs- stress vs cath, NPO pMN in preparation  -ASA, statin, BB  -f/u cards recs    #afib  -BB  -not on full a/c    #DVT ppx- SQH    #from [facility], planned for Yordan Rainey, [phone], updated daily       89 year old woman with hx of mitral valve repair, paroxysmal atrial fibrillation, hx of TIA, chronic unsteady gait, hx of fall, hx of intracranial hemorrhage, no longer on AC, lumbar degenerative changes, spinal stenosis, and R breast mass for which she declined biopsy (given that she would decline surgery, chemo etc if found to be cancer), presented for further evaluation of R wrist weakness, no other focal complaints. R wrist weakness did start suddenly, while awake. She did on ROS also note chronic, progressively worsening generalized weakness over a period of months. No other complaints. In the ED, CT head was negative for hemorrhage, negative for acute territorial infarction. Placed on tele and admitted to medicine for further management.     R wrist weakness  Symptoms started suddenly while she was awake. DDx included stroke affecting right hand cortical representation, metastatic lesion, radial neuropathy, brachial plexopathy. Appreciate input from Neurology. MRI brain does not show any acute stroke. Old infarcts seen on MRI brain. Now on aspirin. No metastatic disease seen in spine or brachial plexus. No pathology to suggest wrist/hand weakness. A1c WNL. TSH WNL. 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 Ortho and OT evals appreciated.   - Continue restorative PT/OT sessions  - Continue wrist splint    Possible incidental soft tissue lesion vs collection at the level of the C spine adjacent to C6 spinous process  Appreciate input from ID who suggested IR evaluation for possible aspiration.    - F/u with IR    Elevated troponin  Likely myocardial demand ischemia, unable to rule out infarction. TTE with moderately reduced left ventricular systolic function with inferior wall akinesis. Estimated left ventricular ejection fraction is 40-45 %. Underwent cardiac cath today, found to have non occlusive coronary artery disease. Interventional Cardiology recommending medical management.   - Continue aspirin, statin, beta blocker    Severe aortic stenosis  As noted on TTE and subsequent cardiac cath.   - Structural heart disease outpatient follow up with Dr. Ramirez on 11/16.     Paroxysmal atrial fibrillation  NSR, rate WNL. On Metoprolol. Not on AC due to hx of past falls, past ICH.   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           DVT ppx: Heparin subcut  Dispo: Anticipate DC to [facility] though she is currently residing at [facility]     89 year old woman with hx of mitral valve repair, paroxysmal atrial fibrillation, hx of TIA, chronic unsteady gait, hx of fall, hx of intracranial hemorrhage, no longer on AC, lumbar degenerative changes, spinal stenosis, and R breast mass for which she declined biopsy (given that she would decline surgery, chemo etc if found to be cancer), presented for further evaluation of R wrist weakness, no other focal complaints. R wrist weakness did start suddenly, while awake. She did on ROS also note chronic, progressively worsening generalized weakness over a period of months. No other complaints. In the ED, CT head was negative for hemorrhage, negative for acute territorial infarction. Placed on tele and admitted to medicine for further management.     R wrist weakness  Symptoms started suddenly while she was awake. DDx included stroke affecting right hand cortical representation, metastatic lesion, radial neuropathy, brachial plexopathy. Appreciate input from Neurology. MRI brain does not show any acute stroke. Old infarcts seen on MRI brain. Now on aspirin. No metastatic disease seen in spine or brachial plexus. No pathology to suggest wrist/hand weakness. A1c WNL. TSH WNL. 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 Ortho and OT evals appreciated.   - Continue restorative PT/OT sessions  - Continue wrist splint    Possible incidental soft tissue lesion vs collection at the level of the C spine adjacent to C6 spinous process  Appreciate input from ID who suggested IR evaluation for possible aspiration.    - F/u with IR    Elevated troponin  Likely myocardial demand ischemia, unable to rule out infarction. TTE with moderately reduced left ventricular systolic function with inferior wall akinesis. Estimated left ventricular ejection fraction is 40-45 %. Underwent cardiac cath today, found to have non occlusive coronary artery disease. Interventional Cardiology recommending medical management.   - Continue aspirin, statin, beta blocker    Severe aortic stenosis  As noted on TTE and subsequent cardiac cath.   - Structural heart disease outpatient follow up with Dr. Ramirez on 11/16.     Paroxysmal atrial fibrillation  NSR, rate WNL. On Metoprolol. Not on AC due to hx of past falls, past ICH.   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     Normocytic anemia  Likely chronic. Likely anemia of chronic disease. No overt bleeding. No signs of hemolysis.   - Will check retic, LDH, haptoglobin, iron studies, ferritin, b12          DVT ppx: Heparin subcut  Dispo: Anticipate DC to [facility] though she is currently residing at [facility]

## 2022-11-02 NOTE — OCCUPATIONAL THERAPY INITIAL EVALUATION ADULT - STRENGTHENING, PT EVAL
Patient will improve R wrist strength by 1/2 grade upon d/c to facilitate increased (I) and safety w/ self-care ADLs

## 2022-11-02 NOTE — PROGRESS NOTE ADULT - TIME BILLING
Case discussed with patient, nursing, Dr. Jean Baptiste, Dr. Waldrop
Case discussed with patient, nursing, Dr. Jean Baptiste, Dr. Elaine
Differential diagnosis and plan of care discussed with patient after the evaluation.   Counseling on diet, exercise, and medication compliance was done.  Counseling on smoking and alcohol cessation was offered when appropriate.  Pain assessed and judicious use of narcotics when appropriate was discussed.  Importance of fall prevention discussed.  Advanced care planning was discussed with patient and family.

## 2022-11-02 NOTE — OCCUPATIONAL THERAPY INITIAL EVALUATION ADULT - MANUAL MUSCLE TESTING RESULTS, REHAB EVAL
LUE: WFL, RUE: 3+/5 shoulder flexion, elbow 3+ to 4-/5, wrist extension: 2-/5 (wrist drop noted, difficulty coming to extension), R grasp decreased compared to L

## 2022-11-02 NOTE — PROGRESS NOTE ADULT - SUBJECTIVE AND OBJECTIVE BOX
Date of service: 11-02-22 @ 12:56    Lying in bed in NAD  No cervical pain  No fever    ROS: no fever or chills; denies dizziness, no HA, no SOB or cough, no abdominal pain, no diarrhea or constipation; no dysuria, no legs pain, no rashes    MEDICATIONS  (STANDING):  aspirin enteric coated 81 milliGRAM(s) Oral daily  calcium carbonate 1250 mG  + Vitamin D (OsCal 500 + D) 1 Tablet(s) Oral daily  heparin   Injectable 5000 Unit(s) SubCutaneous every 8 hours  levothyroxine 100 MICROGram(s) Oral daily  metoprolol succinate ER 25 milliGRAM(s) Oral daily  multivitamin/minerals 1 Tablet(s) Oral daily  pravastatin 40 milliGRAM(s) Oral at bedtime  sucralfate 1 Gram(s) Oral two times a day    Vital Signs Last 24 Hrs  T(C): 36.7 (02 Nov 2022 09:30), Max: 36.7 (02 Nov 2022 09:30)  T(F): 98.1 (02 Nov 2022 09:30), Max: 98.1 (02 Nov 2022 09:30)  HR: 66 (02 Nov 2022 11:00) (66 - 88)  BP: 110/60 (02 Nov 2022 11:00) (97/85 - 177/56)  BP(mean): 72 (02 Nov 2022 01:40) (72 - 82)  RR: 18 (02 Nov 2022 09:30) (18 - 19)  SpO2: 98% (02 Nov 2022 09:30) (96% - 100%)    Parameters below as of 02 Nov 2022 09:30  Patient On (Oxygen Delivery Method): room air     Physical exam:    Constitutional:  No acute distress  HEENT: NC/AT, EOMI, PERRLA, conjunctivae clear; ears and nose atraumatic  Neck: supple; thyroid not palpable  Back: no tenderness; no cervical spine tenderness; full ROM  Respiratory: respiratory effort normal; clear to auscultation  Cardiovascular: S1S2 regular, no murmurs  Abdomen: soft, not tender, not distended, positive BS  Genitourinary: no suprapubic tenderness  Lymphatic: no LN palpable  Musculoskeletal: no muscle tenderness, no joint swelling or tenderness  Extremities: no pedal edema  Neurological/ Psychiatric: AxOx3, moving all extremities  Skin: no rashes; no palpable lesions    Labs: reviewed                        9.9    5.61  )-----------( 241      ( 02 Nov 2022 07:09 )             30.8     11-02    138  |  107  |  23  ----------------------------<  105<H>  4.1   |  25  |  0.67    Ca    8.5      02 Nov 2022 07:09    C-Reactive Protein, Serum: <3 mg/L (10-31-22 @ 19:09)                        9.8    5.33  )-----------( 245      ( 31 Oct 2022 06:55 )             30.5     10-31    138  |  106  |  22  ----------------------------<  93  3.9   |  26  |  0.60    Ca    8.7      31 Oct 2022 06:55      Radiology: all available radiological tests reviewed    < from: MR Angio Neck No Cont (10.31.22 @ 12:09) >  1.  RIGHT CAROTID NECK CIRCULATION:   Intact.  2.  LEFT CAROTID NECK CIRCULATION:    Intact.  3.  VERTEBRAL NECK CIRCULATION:   Intact  4.  ANTERIOR INTRACRANIAL CIRCULATION:     Intracranial atherosclerosis   cavernous carotid segments of the internal carotid arteries, while.  5.  POSTERIOR INTRACRANIAL CIRCULATION:   Intact.  6.  BRAIN:    No evidence of acute infarction. Bilateral cerebellar   remote infarctions. Ischemic white matter disease lower range typical for   age. Atrophytypical for age. Small pericallosal lipoma.  < end of copied text >    < from: MR Cervical Spine w/wo IV Cont (10.31.22 @ 12:08) >      1.   Cervical spine:   Small rim enhancing collection posterior to   the C6 spinous process measuring 1.4 x 1.6 x 1 cm question possiblesoft   tissue lesion or abscess.   No evidence of osseous metastases. Mild disc   degeneration at C6/7 with loss of disc height and signals nucleus   pulposus. Mild narrowing of the RIGHT C4-5, moderate narrowing of the   RIGHT C5-6 and marked narrowing of the BILATERAL C6-7 neural foramina due   to uncovertebral spurring and facet osteophytic hypertrophy.        2.   Thoracic spine:   focal kyphosis at T8-9.  Thoracic vertebral   body heights are significant for chronic compression deformities of T8-T9   with loss of 50 and 80% of vertebral body height and evidence of prior   kyphoplasty.   Tiny RIGHT paracentral disc herniation at T9-T10.        3.   Lumbar spine:   Grade 1 posterior spondylolisthesis at L2-3 and   grade 1 anterior spondylolisthesis at L4-5 on a degenerative basis. Grade   1 posterior spondylolisthesis at L5-S1 appears to be due to BILATERAL L5   spondylolysis.   Moderate to severe degenerative disc disease and   spondylosis at L2-3 through L5-S1 with loss of disc height and associated   degenerative endplate changes. Disc bulges at L2-3 through L5-S1 flatten   the ventral thecal sac and narrow the BILATERAL neural foramina. Moderate   to severe central stenosis at L3-4 and L4-5 on a degenerative basis due   to discbulge, facet osteophytic hypertrophy and redundancy of ligamentum   flavum. Marked facet osteophytic hypertrophy also noted at L5-S1.  < end of copied text >      Advanced directives addressed: full resuscitation

## 2022-11-02 NOTE — OCCUPATIONAL THERAPY INITIAL EVALUATION ADULT - ADDITIONAL COMMENTS
Patient reports he resides in a Patient reports he resides at [facility] which is handicap assessable. Patient states she completed most ADL tasks (MI) + increased time w/ exception of bathing- reports aids completed for her- used a walk in shower + SC and GB. Patient states she used a commode and GB. Patient reports walking using a RW without assistance for short distances and the aids brought her down to the dining room with a w/c.

## 2022-11-02 NOTE — PROGRESS NOTE ADULT - SUBJECTIVE AND OBJECTIVE BOX
Interval History:  11/2/22: no new complaints    MEDICATIONS  (STANDING):  aspirin enteric coated 81 milliGRAM(s) Oral daily  calcium carbonate 1250 mG  + Vitamin D (OsCal 500 + D) 1 Tablet(s) Oral daily  heparin   Injectable 5000 Unit(s) SubCutaneous every 8 hours  levothyroxine 100 MICROGram(s) Oral daily  metoprolol succinate ER 25 milliGRAM(s) Oral daily  multivitamin/minerals 1 Tablet(s) Oral daily  pravastatin 40 milliGRAM(s) Oral at bedtime  sucralfate 1 Gram(s) Oral two times a day    MEDICATIONS  (PRN):  acetaminophen     Tablet .. 650 milliGRAM(s) Oral every 6 hours PRN Temp greater or equal to 38C (100.4F), Mild Pain (1 - 3)  aluminum hydroxide/magnesium hydroxide/simethicone Suspension 30 milliLiter(s) Oral every 4 hours PRN Dyspepsia  artificial  tears Solution 1 Drop(s) Both EYES two times a day PRN Dry Eyes  melatonin 3 milliGRAM(s) Oral at bedtime PRN Insomnia  ondansetron Injectable 4 milliGRAM(s) IV Push every 8 hours PRN Nausea and/or Vomiting      Allergies    aspirin (Unknown)  cinnamon (Other)  Flomax (Unknown)  tramadol (Unknown)    Intolerances        PHYSICAL EXAM:  Vital Signs Last 24 Hrs  T(F): 98.1 (11-02-22 @ 09:30)  HR: 66 (11-02-22 @ 11:00)  BP: 110/60 (11-02-22 @ 11:00)  RR: 18 (11-02-22 @ 09:30)    GENERAL: NAD, well-groomed  HEAD:  Atraumatic, Normocephalic  Neuro:  HF: A x O x 3. Appropriately interactive, normal affect. Speech fluent, No Aphasia or paraphasic errors. Naming /repetition intact   CN: BRIDGETTE, EOMI, VFF, facial sensation normal, no NLFD, tongue midline, Palate moves equally, SCM equal bilaterally  Motor: +weakness 0/5 in right wrist extension, subtle weakness 4+/5 right brachioradialis, able to extend fingers on right, normal finger flexion, wrist flexion, and finger flexion. Decreased  on right. Full strength in LUE. No focal weakness in lower extremities.    Sens: Intact to light touch / VS    Reflexes: Symmetric and normal . BJ 1+, BR 1+, KJ 1+, AJ 1+, downgoing toes b/l  Coord:  No FNFA, dysmetria, PAUL intact         LABS:                        9.9    5.61  )-----------( 241      ( 02 Nov 2022 07:09 )             30.8     11-02    138  |  107  |  23  ----------------------------<  105<H>  4.1   |  25  |  0.67    Ca    8.5      02 Nov 2022 07:09            RADIOLOGY & ADDITIONAL STUDIES:  CT head and cervical spine 10/31/22:  CT HEAD: No acute intracranial hemorrhage or mass affect. Chronic small   vessel ischemic changes and lacunar infarcts.    CT CERVICAL SPINE: No acute cervical spine fracture or traumatic   malalignment. Multilevel cervical spondylosis.      MR head, MRA head and neck 10/31/22:  1.  RIGHT CAROTID NECK CIRCULATION:   Intact.    2.  LEFT CAROTID NECK CIRCULATION:    Intact.    3.  VERTEBRAL NECK CIRCULATION:   Intact    4.  ANTERIOR INTRACRANIAL CIRCULATION:     Intracranial atherosclerosis   cavernous carotid segments of the internal carotid arteries, while.    5.  POSTERIOR INTRACRANIAL CIRCULATION:   Intact.    6.  BRAIN:    No evidence of acute infarction. Bilateral cerebellar   remote infarctions. Ischemic white matter disease lower range typical for   age. Atrophy typical for age. Small pericallosal lipoma.      MR cervical, thoracic and lumbar spine with and without contrast 10/31/22:      1.   Cervical spine:   Small rim enhancing collection posterior to   the C6 spinous process measuring 1.4 x 1.6 x 1 cm question possible soft   tissue lesion or abscess.   No evidence of osseous metastases. Mild disc   degeneration at C6/7 with loss of disc height and signals nucleus   pulposus. Mild narrowing of the RIGHT C4-5, moderate narrowing of the   RIGHT C5-6 and marked narrowing of the BILATERAL C6-7 neural foramina due   to uncovertebral spurring and facet osteophytic hypertrophy.        2.   Thoracic spine:   focal kyphosis at T8-9.  Thoracic vertebral   body heights are significant for chronic compression deformities of T8-T9   with loss of 50 and 80% of vertebral body height and evidence of prior   kyphoplasty.   Tiny RIGHT paracentral disc herniation at T9-T10.        3.   Lumbar spine:   Grade 1 posterior spondylolisthesis at L2-3 and   grade 1 anterior spondylolisthesis at L4-5 on a degenerative basis. Grade   1 posterior spondylolisthesis at L5-S1 appears to be due to BILATERAL L5   spondylolysis.   Moderate to severe degenerative disc disease and   spondylosis at L2-3 through L5-S1 with loss of disc height and associated   degenerative endplate changes. Disc bulges at L2-3 through L5-S1 flatten   the ventral thecal sac and narrow the BILATERAL neural foramina. Moderate   to severe central stenosis at L3-4 and L4-5 on a degenerative basis due   to disc bulge, facet osteophytic hypertrophy and redundancy of ligamentum   flavum. Marked facet osteophytic hypertrophy also noted at L5-S1.    MR brachial plexus 11/1/22:  Grossly unremarkable right brachial plexus.  Moderate levoscoliosis of the cervical spine.  Severe right facet arthropathy at C3-4, C5-6 and C7-T1 with marked   associated edema and enhancement at C3-C4, and possible early ankylosis   at C5-6 and C7-T1.

## 2022-11-02 NOTE — OCCUPATIONAL THERAPY INITIAL EVALUATION ADULT - PERTINENT HX OF CURRENT PROBLEM, REHAB EVAL
Patient is an 89 y/o female who was sent to  ED from [facility] because she developed right hand to finger paralysis. The patient's symptoms started last night and she did not want to come to the ER last night waiting for her symptoms to improve in the morning. After her symptoms did not improve it was referred to the ER. The patient was diagnosed with Covid 19 infection a month ago. The patient was recently diagnosed with right breast mass and biopsy was recommended and scheduled as an outpatient.  The patient is also complaining of a Baker's cyst in the right knee which she wants to be evaluated for by orthopedics. CTH (-), MRI brain, MRA H/N (-) for CVA, mets, vascular abnormalities. MRI brachial plexus w chronic findings. 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 Patient is an 89 y/o female who was sent to  ED from [facility] because she developed right hand to finger paralysis. The patient's symptoms started last night and she did not want to come to the ER last night waiting for her symptoms to improve in the morning. After her symptoms did not improve it was referred to the ER. The patient was diagnosed with Covid 19 infection a month ago. The patient was recently diagnosed with right breast mass and biopsy was recommended and scheduled as an outpatient.  The patient is also complaining of a Baker's cyst in the right knee which she wants to be evaluated for by orthopedics. CTH (-), MRI brain, MRA H/N (-) for CVA, mets, vascular abnormalities. MRI brachial plexus w chronic findings. 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 Patient going for cardiac cath procedure this afternoon. Patient is an 88 y/o female who was sent to  ED from [facility] because she developed right hand to finger paralysis. The patient's symptoms started last night and she did not want to come to the ER last night waiting for her symptoms to improve in the morning. After her symptoms did not improve it was referred to the ER. The patient was diagnosed with Covid 19 infection a month ago. The patient was recently diagnosed with right breast mass and biopsy was recommended and scheduled as an outpatient.  The patient is also complaining of a Baker's cyst in the right knee which she wants to be evaluated for by orthopedics. CTH (-), MRI brain, MRA H/N (-) for CVA, mets, vascular abnormalities. MRI brachial plexus w chronic findings. 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

## 2022-11-02 NOTE — CONSULT NOTE ADULT - SUBJECTIVE AND OBJECTIVE BOX
Orthopedics Consult Note:    This is a 89y Female admitted to medical service with c/o right wrist and hand paralysis. Orthopedics consult placed to evaluate right wrist drop. Pt seen at bedside reporting sudden onset of right wrist and hand/finger weakness and LROM which began Friday night. Pt reports since Friday her wrist and hand/finger strength and ROM largely improved but still weak. Pt denies any injury or trauma. Pt c/o some chronic neck pain which is unchanged. Pt denies any numbness or tingling. Pt is RHD. Pt denies any fever or chills.     Past Medical & Surgical History:  Other specified abnormalities of plasma proteins    No pertinent family history in first degree relatives    Handoff    MEWS Score    Elevated troponin    Weakness of right hand    Elevated troponin    Essential hypertension    PAF (paroxysmal atrial fibrillation)    Valvular heart disease    Neck pain    No significant past surgical history    NUMBNESS IN FINGERS    90+    Right hand weakness    [email or web address]        Allergies:  aspirin (Unknown)  cinnamon (Other)  Flomax (Unknown)  tramadol (Unknown)      Vital Signs:  T(C): 36.7 (11-02-22 @ 09:30), Max: 36.7 (11-02-22 @ 09:30)  HR: 66 (11-02-22 @ 11:00) (66 - 88)  BP: 110/60 (11-02-22 @ 11:00) (97/85 - 177/56)  RR: 18 (11-02-22 @ 09:30) (18 - 19)  SpO2: 98% (11-02-22 @ 09:30) (96% - 100%)    Labs:                        9.9    5.61  )-----------( 241      ( 02 Nov 2022 07:09 )             30.8     11-02    138  |  107  |  23  ----------------------------<  105<H>  4.1   |  25  |  0.67    Ca    8.5      02 Nov 2022 07:09      Imaging:  X-rays of the right hand demonstrate diffuse degenerative changes.   MRI of the Head with no evidence of acute infarction.  MRI of the C/T/L Spine with small rim enhancing collection posterior to the C6 spinous process measuring 1.4 x 1.6 x 1 cm question possible soft tissue lesion or abscess. No evidence of osseous metastases. Degenerative changes noted throughout.  MRI of the Brachial Plexus grossly unremarkable per read.      PE right hand/wrist:  No swelling, no ecchymosis, no erythema, skin intact, normothermic. No TTP. Limited ROM of wrist, unable to dorsiflex past neutral; unable to dorsiflex against resistance. Able to extend the thumb, unable to extend against resistance. Weak Radial nerve/PIN. +AIN/Median nerve/Ulnar nerve/Musculocutaneous. Able to range at all IP and MCPs. SILT throughout. Radial pulse 2+, cap refill <2secs.    Assessment:  89y Female with right wrist drop; improving per patient.     Plan:  No acute orthopedic intervention indicated at this time.  Cock-up splint applied to the right wrist.  NWB Right hand.  Neurology and Ortho spine on board; consult notes appreciated.  Follow-up with Dr. Alicea 1 week after discharge.     Case discussed with Dr. Alicea who agrees with plan. Orthopedics Consult Note:    This is a 89y Female admitted to medical service with c/o right wrist and hand paralysis. Orthopedics consult placed to evaluate right wrist drop. Pt seen at bedside reporting sudden onset of right wrist and hand/finger weakness and LROM which began Friday night. Pt reports since Friday her wrist and hand/finger strength and ROM largely improved but still weak. Pt denies any injury or trauma. Pt c/o some chronic neck pain which is unchanged. Pt denies any numbness or tingling. Pt is RHD. Pt denies any fever or chills.     Past Medical & Surgical History:  Other specified abnormalities of plasma proteins    No pertinent family history in first degree relatives    Handoff    MEWS Score    Elevated troponin    Weakness of right hand    Elevated troponin    Essential hypertension    PAF (paroxysmal atrial fibrillation)    Valvular heart disease    Neck pain    No significant past surgical history    NUMBNESS IN FINGERS    90+    Right hand weakness    [email or web address]        Allergies:  aspirin (Unknown)  cinnamon (Other)  Flomax (Unknown)  tramadol (Unknown)      Vital Signs:  T(C): 36.7 (11-02-22 @ 09:30), Max: 36.7 (11-02-22 @ 09:30)  HR: 66 (11-02-22 @ 11:00) (66 - 88)  BP: 110/60 (11-02-22 @ 11:00) (97/85 - 177/56)  RR: 18 (11-02-22 @ 09:30) (18 - 19)  SpO2: 98% (11-02-22 @ 09:30) (96% - 100%)    Labs:                        9.9    5.61  )-----------( 241      ( 02 Nov 2022 07:09 )             30.8     11-02    138  |  107  |  23  ----------------------------<  105<H>  4.1   |  25  |  0.67    Ca    8.5      02 Nov 2022 07:09      Imaging:  X-rays of the right hand demonstrate diffuse degenerative changes.   MRI of the Head with no evidence of acute infarction.  MRI of the C/T/L Spine with small rim enhancing collection posterior to the C6 spinous process measuring 1.4 x 1.6 x 1 cm question possible soft tissue lesion or abscess. No evidence of osseous metastases. Degenerative changes noted throughout.  MRI of the Brachial Plexus grossly unremarkable per read.      Physical Exam:  General:   No acute distress, AAOx3.  Musculoskeletal:  Right hand/wrist:  No swelling, no ecchymosis, no erythema, skin intact, normothermic. No TTP. Limited ROM of wrist, unable to dorsiflex past neutral; unable to dorsiflex against resistance. Able to extend the thumb, unable to extend against resistance. Weak Radial nerve/PIN. +AIN/Median nerve/Ulnar nerve/Musculocutaneous. Able to range at all IP and MCPs. SILT throughout. Radial pulse 2+, cap refill <2secs.    Assessment:  89y Female with right wrist drop; improving per patient.     Plan:  No acute orthopedic intervention indicated at this time.  Cock-up splint applied to the right wrist.  NWB Right hand.  Neurology and Ortho spine on board; consult notes appreciated.  Follow-up with Dr. Alicea 1 week after discharge.     Case discussed with Dr. Alicea who agrees with plan.

## 2022-11-03 LAB
ANION GAP SERPL CALC-SCNC: 5 MMOL/L — SIGNIFICANT CHANGE UP (ref 5–17)
BASOPHILS # BLD AUTO: 0.03 K/UL — SIGNIFICANT CHANGE UP (ref 0–0.2)
BASOPHILS NFR BLD AUTO: 0.5 % — SIGNIFICANT CHANGE UP (ref 0–2)
BUN SERPL-MCNC: 19 MG/DL — SIGNIFICANT CHANGE UP (ref 7–23)
CALCIUM SERPL-MCNC: 8.6 MG/DL — SIGNIFICANT CHANGE UP (ref 8.5–10.1)
CHLORIDE SERPL-SCNC: 107 MMOL/L — SIGNIFICANT CHANGE UP (ref 96–108)
CO2 SERPL-SCNC: 25 MMOL/L — SIGNIFICANT CHANGE UP (ref 22–31)
CREAT SERPL-MCNC: 0.6 MG/DL — SIGNIFICANT CHANGE UP (ref 0.5–1.3)
EGFR: 86 ML/MIN/1.73M2 — SIGNIFICANT CHANGE UP
EOSINOPHIL # BLD AUTO: 0.32 K/UL — SIGNIFICANT CHANGE UP (ref 0–0.5)
EOSINOPHIL NFR BLD AUTO: 5.7 % — SIGNIFICANT CHANGE UP (ref 0–6)
FERRITIN SERPL-MCNC: 37 NG/ML — SIGNIFICANT CHANGE UP (ref 15–150)
GLUCOSE SERPL-MCNC: 98 MG/DL — SIGNIFICANT CHANGE UP (ref 70–99)
HAPTOGLOB SERPL-MCNC: 193 MG/DL — SIGNIFICANT CHANGE UP (ref 34–200)
HCT VFR BLD CALC: 30.6 % — LOW (ref 34.5–45)
HGB BLD-MCNC: 9.7 G/DL — LOW (ref 11.5–15.5)
IMM GRANULOCYTES NFR BLD AUTO: 0.4 % — SIGNIFICANT CHANGE UP (ref 0–0.9)
IRON SATN MFR SERPL: 11 % — LOW (ref 14–50)
IRON SATN MFR SERPL: 36 UG/DL — SIGNIFICANT CHANGE UP (ref 30–160)
LDH SERPL L TO P-CCNC: 103 U/L — SIGNIFICANT CHANGE UP (ref 84–241)
LYMPHOCYTES # BLD AUTO: 1.02 K/UL — SIGNIFICANT CHANGE UP (ref 1–3.3)
LYMPHOCYTES # BLD AUTO: 18.3 % — SIGNIFICANT CHANGE UP (ref 13–44)
MCHC RBC-ENTMCNC: 29 PG — SIGNIFICANT CHANGE UP (ref 27–34)
MCHC RBC-ENTMCNC: 31.7 GM/DL — LOW (ref 32–36)
MCV RBC AUTO: 91.3 FL — SIGNIFICANT CHANGE UP (ref 80–100)
MONOCYTES # BLD AUTO: 0.57 K/UL — SIGNIFICANT CHANGE UP (ref 0–0.9)
MONOCYTES NFR BLD AUTO: 10.2 % — SIGNIFICANT CHANGE UP (ref 2–14)
NEUTROPHILS # BLD AUTO: 3.61 K/UL — SIGNIFICANT CHANGE UP (ref 1.8–7.4)
NEUTROPHILS NFR BLD AUTO: 64.9 % — SIGNIFICANT CHANGE UP (ref 43–77)
PLATELET # BLD AUTO: 226 K/UL — SIGNIFICANT CHANGE UP (ref 150–400)
POTASSIUM SERPL-MCNC: 4 MMOL/L — SIGNIFICANT CHANGE UP (ref 3.5–5.3)
POTASSIUM SERPL-SCNC: 4 MMOL/L — SIGNIFICANT CHANGE UP (ref 3.5–5.3)
RBC # BLD: 3.35 M/UL — LOW (ref 3.8–5.2)
RBC # BLD: 3.35 M/UL — LOW (ref 3.8–5.2)
RBC # FLD: 12.5 % — SIGNIFICANT CHANGE UP (ref 10.3–14.5)
RETICS #: 66 K/UL — SIGNIFICANT CHANGE UP (ref 25–125)
RETICS/RBC NFR: 2 % — SIGNIFICANT CHANGE UP (ref 0.5–2.5)
SARS-COV-2 RNA SPEC QL NAA+PROBE: SIGNIFICANT CHANGE UP
SODIUM SERPL-SCNC: 137 MMOL/L — SIGNIFICANT CHANGE UP (ref 135–145)
TIBC SERPL-MCNC: 313 UG/DL — SIGNIFICANT CHANGE UP (ref 220–430)
UIBC SERPL-MCNC: 278 UG/DL — SIGNIFICANT CHANGE UP (ref 110–370)
VIT B12 SERPL-MCNC: 1473 PG/ML — HIGH (ref 232–1245)
WBC # BLD: 5.57 K/UL — SIGNIFICANT CHANGE UP (ref 3.8–10.5)
WBC # FLD AUTO: 5.57 K/UL — SIGNIFICANT CHANGE UP (ref 3.8–10.5)

## 2022-11-03 PROCEDURE — 72126 CT NECK SPINE W/DYE: CPT | Mod: 26

## 2022-11-03 PROCEDURE — 99232 SBSQ HOSP IP/OBS MODERATE 35: CPT

## 2022-11-03 PROCEDURE — 76536 US EXAM OF HEAD AND NECK: CPT | Mod: 26

## 2022-11-03 PROCEDURE — 99233 SBSQ HOSP IP/OBS HIGH 50: CPT

## 2022-11-03 RX ORDER — ACETAMINOPHEN 500 MG
325 TABLET ORAL EVERY 4 HOURS
Refills: 0 | Status: DISCONTINUED | OUTPATIENT
Start: 2022-11-03 | End: 2022-11-04

## 2022-11-03 RX ORDER — ACETAMINOPHEN 500 MG
650 TABLET ORAL EVERY 4 HOURS
Refills: 0 | Status: DISCONTINUED | OUTPATIENT
Start: 2022-11-03 | End: 2022-11-04

## 2022-11-03 RX ADMIN — Medication 40 MILLIGRAM(S): at 21:04

## 2022-11-03 RX ADMIN — HEPARIN SODIUM 5000 UNIT(S): 5000 INJECTION INTRAVENOUS; SUBCUTANEOUS at 06:43

## 2022-11-03 RX ADMIN — Medication 100 MICROGRAM(S): at 06:43

## 2022-11-03 RX ADMIN — Medication 1 GRAM(S): at 21:04

## 2022-11-03 RX ADMIN — Medication 81 MILLIGRAM(S): at 09:32

## 2022-11-03 RX ADMIN — Medication 1 TABLET(S): at 09:31

## 2022-11-03 RX ADMIN — HEPARIN SODIUM 5000 UNIT(S): 5000 INJECTION INTRAVENOUS; SUBCUTANEOUS at 14:16

## 2022-11-03 RX ADMIN — Medication 25 MILLIGRAM(S): at 09:32

## 2022-11-03 RX ADMIN — Medication 1 GRAM(S): at 09:32

## 2022-11-03 RX ADMIN — Medication 1 DROP(S): at 18:29

## 2022-11-03 RX ADMIN — HEPARIN SODIUM 5000 UNIT(S): 5000 INJECTION INTRAVENOUS; SUBCUTANEOUS at 21:05

## 2022-11-03 RX ADMIN — Medication 1 TABLET(S): at 09:32

## 2022-11-03 RX ADMIN — Medication 1 DROP(S): at 21:06

## 2022-11-03 NOTE — PROGRESS NOTE ADULT - PROBLEM SELECTOR PLAN 1
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

## 2022-11-03 NOTE — PROGRESS NOTE ADULT - SUBJECTIVE AND OBJECTIVE BOX
Chief Complaint: R wrist weakness    Interval Hx: Patient seen and examined earlier today. No acute complaints at this time. Patient reports overall improvement in R wrist strength and function since admission, has been working with OT, now able to write better. Etiology remains unclear, suspect peripheral nerve injury. She did undergo cardiac cath today for further evaluation of her abnormal TTE findings, specifically, severe AS and reduced LVEF 40%. Cath today with non occlusive CAD. Recommended medical management. Regarding her severe AS, advised outpatient follow up with Dr. Ramirez to further discuss her structural heart disease, scheduled for 11/16. As for the finding of a small possible collection on C6 spinous process, ID is recommending IR evaluation  for possible tissue biospy for culture and pathology. IR consulted and input pending. Lastly, she has ongoing physical deconditioning and debility. Anticipated that she may need THERESA at discharge and she would be in agreement with such    ROS: All other systems reviewed and found to be negative with the exception of what has been described above.    Vitals:  T(F): 98.5 (02 Nov 2022 19:06), Max: 98.5 (02 Nov 2022 19:06)  HR: 77 (02 Nov 2022 19:06) (66 - 88)  BP: 109/55 (02 Nov 2022 19:06) (97/85 - 177/56)  RR: 18 (02 Nov 2022 19:06) (16 - 18)  SpO2: 93% (02 Nov 2022 19:06) (93% - 100%) on RA    Exam:  Gen: Comfortable appearing  HEENT: NCAT PERRL EOMI MMM clear oropharynx  Neck: Supple, no carotid bruits, no palpable lymph nodes, no thyromegaly  CVS: +S1, +S2, regular, no murmurs or rubs  Chest: Lungs clear to auscultation bilaterally, no wheezing, rales, rhonchi, good air entry bilaterally  Abd: +BS, soft, non-tender, non-distended, no palpable masses  Ext: No edema or calf tenderness, no clubbing or cyanosis, DP pulses intact B/L, R wrist in splint, wiggles fingers, normal cap refill  Skin: Warm dry  Neuro: AAOX3, appropriately interactive, normal affect, speech fluent, no aphasia or paraphasic errors, naming /repetition intact, CN intact, +weakness in R wrist w/ extension, subtle weakness R brachioradialis, able to extend fingers on right, normal finger flexion, wrist flexion. Decreased  on right. Full strength in LUE. No focal weakness in lower extremities, sens intact to light touch, reflexes symmetric and normal, downgoing toes B/L, no FNFA, dysmetria, PAUL intact     Labs:                      9.9    5.61 )-----------( 241             30.8       138  |  107  |  23  ---------------------<  105  4.1   |  25  |  0.67    Ca 8.5    Imaging:  MRI R brachial plexus 11/1: Grossly unremarkable right brachial plexus. Moderate levoscoliosis of the cervical spine. Severe right facet arthropathy at C3-4, C5-6 and C7-T1 with marked associated edema and enhancement at C3-C4, and possible early ankylosis at C5-6 and C7-T1.    MRI complete spine 10/31: No evidence of osseous metastases. Cervical spine w/ small rim enhancing collection posterior to the C6 spinous process measuring 1.4 x 1.6 x 1 cm question possible, soft tissue lesion or abscess. No evidence of osseous metastases. Mild disc degeneration at C6/7 with loss of disc height and signals nucleus pulposus. Mild narrowing of the R C4-5, moderate narrowing of the R C5-6 and marked narrowing of the B/L C6-7 neural foramina due to uncovertebral spurring and facet osteophytic hypertrophy. Thoracic spine w/ focal kyphosis at T8-9. Thoracic vertebral body heights are significant for chronic compression deformities of T8-T9 with loss of 50 and 80% of vertebral body height and evidence of prior kyphoplasty. Tiny R paracentral disc herniation at T9-T10. Lumbar spine  grade 1 posterior spondylolisthesis at L2-3 and grade 1 anterior spondylolisthesis at L4-5 on a degenerative basis. Grade 1 posterior spondylolisthesis at L5-S1 appears to be due to B/L L5 spondylolysis. Moderate to severe degenerative disc disease and spondylosis at L2-3 through L5-S1 with loss of disc height and associated degenerative endplate changes. Disc bulges at L2-3 through L5-S1 flatten the ventral thecal sac and narrow the B/L neural foramina. Moderate to severe central stenosis at L3-4 and L4-5 on a degenerative basis due to disc bulge, facet osteophytic hypertrophy and redundancy of ligamentum flavum. Marked facet osteophytic hypertrophy also noted at L5-S1.    MRA head and neck 10/31: Carotid neck circulation intact. Vertebral neck circulation intact. Anterior intracranial circulation with atherosclerosis cavernous carotid segments of the internal carotid arteries. Posterior intracranial circulation intact.     MRI head w/wo contrast 10/31: No evidence of acute infarction. Bilateral cerebellar remote infarctions. Ischemic white matter disease lower range typical for age. Atrophy typical for age. Small pericallosal lipoma.    CT C spine wo 10/29: No acute cervical spine fracture or traumatic malalignment. Multilevel cervical spondylosis.    CT head wo 10/29: No acute intracranial hemorrhage or mass affect. Chronic small vessel ischemic changes and lacunar infarcts.     CXR 10/29: Question blastic metastatic disease of a lower thoracic body. No acute lung finding.     R hand xray 10/29/22: No fracture of the right hand.    Cardiac Testing:  Echo 11/1/22: Significant fibrocalcific changes noted to the aortic valve leaflets with restriction in leaflet excursion. Calculated LAURA is .85cm2 ; this finding is consistent with severe aortic stenosis. The left atrium is moderately dilated. Mild concentric left ventricular hypertrophy is present. Moderately reduced left ventricular systolic function with inferior wall akinesis. Estimated left ventricular ejection fraction is 40-45 %. The IVC appears normal. Moderate (2+) mitral regurgitation is present. Mild mitral annular calcification is present. Fibrocalcific changes noted to the mitral valve repair-posterior leaflet with reduced leaflet excursion. No evidence of pericardial effusion. No evidence of pleural effusion. Normal appearing pulmonic valve structure and function. Normal appearing right atrium. Normal appearing right ventricle structure and function. Mild (1+) tricuspid valve regurgitation is present.    Tele 11/1: Sinus 60-80, 5 beats aberrancy triplet    Tele 10/31: Sinus 70-90, PVCs, couplets    Tele 10/30: Sinus 60-80, PVCs, couplets    Meds:  MEDICATIONS  (STANDING):  aspirin enteric coated 81 milliGRAM(s) Oral daily  calcium carbonate 1250 mG  + Vitamin D (OsCal 500 + D) 1 Tablet(s) Oral daily  heparin   Injectable 5000 Unit(s) SubCutaneous every 8 hours  levothyroxine 100 MICROGram(s) Oral daily  metoprolol succinate ER 25 milliGRAM(s) Oral daily  multivitamin/minerals 1 Tablet(s) Oral daily  pravastatin 40 milliGRAM(s) Oral at bedtime  sucralfate 1 Gram(s) Oral two times a day    MEDICATIONS  (PRN):  acetaminophen     Tablet .. 650 milliGRAM(s) Oral every 6 hours PRN Temp greater or equal to 38C (100.4F), Mild Pain (1 - 3)  aluminum hydroxide/magnesium hydroxide/simethicone Suspension 30 milliLiter(s) Oral every 4 hours PRN Dyspepsia  artificial  tears Solution 1 Drop(s) Both EYES two times a day PRN Dry Eyes  melatonin 3 milliGRAM(s) Oral at bedtime PRN Insomnia  ondansetron Injectable 4 milliGRAM(s) IV Push every 8 hours PRN Nausea and/or Vomiting     Chief Complaint: R wrist weakness    Interval Hx: Patient seen and examined earlier today. No acute complaints at this time. Patient reports overall improvement in R wrist strength and function since admission, has been working with OT, now able to write better. Etiology remains unclear, suspect peripheral nerve injury. She did undergo cardiac cath 11/2 for further evaluation of her abnormal TTE findings, specifically, severe AS and reduced LVEF 40%. Cath showed non-occlusive CAD. Regarding her severe AS, she has been advised outpatient follow up with Dr. Ramirez to further discuss her structural heart disease, scheduled for 11/16. As for the finding of a small possible collection vs solid lesion adjacent to C6 spinous process, ID recommended IR evaluation for possible tissue biopsy for culture and pathology. Today, IR was unable to visualize via ultrasound. CT C-spine w/ IV contrast is now ordered as per IR and Radiology to better establish the finding, in anticipation of possible sampling tomorrow. Lastly, she has ongoing physical deconditioning and debility for which THERESA is planned upon her discharge.     ROS: All other systems reviewed and found to be negative with the exception of what has been described above.    Vitals:  T(F): 98.6 (03 Nov 2022 19:49), Max: 98.6 (03 Nov 2022 19:49)  HR: 79 (03 Nov 2022 19:49) (70 - 79)  BP: 121/74 (03 Nov 2022 19:49) (121/74 - 134/50)  RR: 18 (03 Nov 2022 19:49) (18 - 18)  SpO2: 97% (03 Nov 2022 19:49) (97% - 99%) on RA    Exam:  Gen: Comfortable appearing  HEENT: NCAT PERRL EOMI MMM clear oropharynx  Neck: Supple, no carotid bruits, no palpable lymph nodes, no thyromegaly  CVS: +S1, +S2, regular, no murmurs or rubs  Chest: Lungs clear to auscultation bilaterally, no wheezing, rales, rhonchi, good air entry bilaterally  Abd: +BS, soft, non-tender, non-distended, no palpable masses  Ext: No edema or calf tenderness, no clubbing or cyanosis, DP pulses intact B/L, R wrist in splint, wiggles fingers, normal cap refill  Skin: Warm dry  Neuro: AAOX3, appropriately interactive, normal affect, speech fluent, no aphasia or paraphasic errors, naming /repetition intact, CN intact, +weakness in R wrist w/ extension, subtle weakness R brachioradialis, able to extend fingers on right, normal finger flexion, wrist flexion. Decreased  on right. Full strength in LUE. No focal weakness in lower extremities, sens intact to light touch, reflexes symmetric and normal, downgoing toes B/L, no FNFA, dysmetria, PAUL intact     Labs:                              9.7    5.57 )-----------( 226              30.6     Retic 2%   LDH and haptoglobin WNL   Iron 36 TIBC 313 Iron sat 11% Ferritin 37   B12 1473      137  |  107  |  19  ----------------------<  98  4.0   |   25   |  0.60    Ca 8.6    A1c 5.2    LDL 67    TSH WNL    CRP < 3      ESR 47    Troponin 244    COVID19 PCR negative    Imaging:  MRI R brachial plexus 11/1: Grossly unremarkable right brachial plexus. Moderate levoscoliosis of the cervical spine. Severe right facet arthropathy at C3-4, C5-6 and C7-T1 with marked associated edema and enhancement at C3-C4, and possible early ankylosis at C5-6 and C7-T1.    MRI complete spine 10/31: No evidence of osseous metastases. Cervical spine w/ small rim enhancing collection posterior to the C6 spinous process measuring 1.4 x 1.6 x 1 cm question possible, soft tissue lesion or abscess. No evidence of osseous metastases. Mild disc degeneration at C6/7 with loss of disc height and signals nucleus pulposus. Mild narrowing of the R C4-5, moderate narrowing of the R C5-6 and marked narrowing of the B/L C6-7 neural foramina due to uncovertebral spurring and facet osteophytic hypertrophy. Thoracic spine w/ focal kyphosis at T8-9. Thoracic vertebral body heights are significant for chronic compression deformities of T8-T9 with loss of 50 and 80% of vertebral body height and evidence of prior kyphoplasty. Tiny R paracentral disc herniation at T9-T10. Lumbar spine  grade 1 posterior spondylolisthesis at L2-3 and grade 1 anterior spondylolisthesis at L4-5 on a degenerative basis. Grade 1 posterior spondylolisthesis at L5-S1 appears to be due to B/L L5 spondylolysis. Moderate to severe degenerative disc disease and spondylosis at L2-3 through L5-S1 with loss of disc height and associated degenerative endplate changes. Disc bulges at L2-3 through L5-S1 flatten the ventral thecal sac and narrow the B/L neural foramina. Moderate to severe central stenosis at L3-4 and L4-5 on a degenerative basis due to disc bulge, facet osteophytic hypertrophy and redundancy of ligamentum flavum. Marked facet osteophytic hypertrophy also noted at L5-S1.    MRA head and neck 10/31: Carotid neck circulation intact. Vertebral neck circulation intact. Anterior intracranial circulation with atherosclerosis cavernous carotid segments of the internal carotid arteries. Posterior intracranial circulation intact.     MRI head w/wo contrast 10/31: No evidence of acute infarction. Bilateral cerebellar remote infarctions. Ischemic white matter disease lower range typical for age. Atrophy typical for age. Small pericallosal lipoma.    CT C spine wo 10/29: No acute cervical spine fracture or traumatic malalignment. Multilevel cervical spondylosis.    CT head wo 10/29: No acute intracranial hemorrhage or mass affect. Chronic small vessel ischemic changes and lacunar infarcts.     CXR 10/29: Question blastic metastatic disease of a lower thoracic body. No acute lung finding.     R hand xray 10/29/22: No fracture of the right hand.    Cardiac Testing:  Echo 11/1/22: Significant fibrocalcific changes noted to the aortic valve leaflets with restriction in leaflet excursion. Calculated LAURA is .85cm2 ; this finding is consistent with severe aortic stenosis. The left atrium is moderately dilated. Mild concentric left ventricular hypertrophy is present. Moderately reduced left ventricular systolic function with inferior wall akinesis. Estimated left ventricular ejection fraction is 40-45 %. The IVC appears normal. Moderate (2+) mitral regurgitation is present. Mild mitral annular calcification is present. Fibrocalcific changes noted to the mitral valve repair-posterior leaflet with reduced leaflet excursion. No evidence of pericardial effusion. No evidence of pleural effusion. Normal appearing pulmonic valve structure and function. Normal appearing right atrium. Normal appearing right ventricle structure and function. Mild (1+) tricuspid valve regurgitation is present.    Tele 11/1: Sinus 60-80, 5 beats aberrancy triplet    Tele 10/31: Sinus 70-90, PVCs, couplets    Tele 10/30: Sinus 60-80, PVCs, couplets    Meds:  MEDICATIONS  (STANDING):  aspirin enteric coated 81 milliGRAM(s) Oral daily  calcium carbonate 1250 mG  + Vitamin D (OsCal 500 + D) 1 Tablet(s) Oral daily  heparin   Injectable 5000 Unit(s) SubCutaneous every 8 hours  levothyroxine 100 MICROGram(s) Oral daily  metoprolol succinate ER 25 milliGRAM(s) Oral daily  multivitamin/minerals 1 Tablet(s) Oral daily  pravastatin 40 milliGRAM(s) Oral at bedtime  sucralfate 1 Gram(s) Oral two times a day    MEDICATIONS  (PRN):  acetaminophen     Tablet .. 325 milliGRAM(s) Oral every 4 hours PRN Mild Pain (1 - 3)  acetaminophen     Tablet .. 650 milliGRAM(s) Oral every 4 hours PRN Temp greater or equal to 38C (100.4F), Moderate Pain (4 - 6)  aluminum hydroxide/magnesium hydroxide/simethicone Suspension 30 milliLiter(s) Oral every 4 hours PRN Dyspepsia  artificial  tears Solution 1 Drop(s) Both EYES two times a day PRN Dry Eyes  melatonin 3 milliGRAM(s) Oral at bedtime PRN Insomnia  ondansetron Injectable 4 milliGRAM(s) IV Push every 8 hours PRN Nausea and/or Vomiting   Chief Complaint: R wrist weakness    Interval Hx: Patient seen and examined earlier today. No acute complaints at this time. Patient reports overall improvement in R wrist strength and function since admission, has been working with OT, now able to write better. Etiology remains unclear, suspect peripheral nerve injury. She did undergo cardiac cath 11/2 for further evaluation of her abnormal TTE findings, specifically, severe AS and reduced LVEF 40%. Cath showed non-occlusive CAD. Regarding her severe AS, she has been advised outpatient follow up with Dr. Ramirez to further discuss her structural heart disease, scheduled for 11/16. As for the finding of a small possible collection vs solid lesion adjacent to C6 spinous process, ID recommended IR evaluation for possible tissue biopsy for culture and pathology. Today, IR was unable to visualize via ultrasound. CT C-spine w/ IV contrast is now ordered as per IR and Radiology to better establish the finding, in anticipation of possible sampling tomorrow. Lastly, she has ongoing physical deconditioning and debility for which THERESA is planned upon her discharge.     ROS: All other systems reviewed and found to be negative with the exception of what has been described above.    Vitals:  T(F): 98.6 (03 Nov 2022 19:49), Max: 98.6 (03 Nov 2022 19:49)  HR: 79 (03 Nov 2022 19:49) (70 - 79)  BP: 121/74 (03 Nov 2022 19:49) (121/74 - 134/50)  RR: 18 (03 Nov 2022 19:49) (18 - 18)  SpO2: 97% (03 Nov 2022 19:49) (97% - 99%) on RA    Exam:  Gen: Comfortable appearing  HEENT: NCAT PERRL EOMI MMM clear oropharynx  Neck: Supple, no carotid bruits, no palpable lymph nodes, no thyromegaly  CVS: +S1, +S2, regular, no murmurs or rubs  Chest: Lungs clear to auscultation bilaterally, no wheezing, rales, rhonchi, good air entry bilaterally  Abd: +BS, soft, non-tender, non-distended, no palpable masses  Ext: No edema or calf tenderness, no clubbing or cyanosis, DP pulses intact B/L, R wrist in splint, wiggles fingers, normal cap refill  Skin: Warm dry  Neuro: AAOX3, appropriately interactive, normal affect, speech fluent, no aphasia or paraphasic errors, naming /repetition intact, CN intact, +weakness in R wrist w/ extension, subtle weakness R brachioradialis, able to extend fingers on right, normal finger flexion, wrist flexion. Decreased  on right. Full strength in LUE. No focal weakness in lower extremities, sens intact to light touch, reflexes symmetric and normal, downgoing toes B/L, no FNFA, dysmetria, PAUL intact     Labs:                              9.7    5.57 )--------( 226              30.6     Retic 2%   LDH and haptoglobin WNL   Iron 36 TIBC 313 Iron sat 11% Ferritin 37   B12 1473      137  |  107  |  19  ----------------------<  98  4.0   |   25   |  0.60    Ca 8.6    A1c 5.2    LDL 67    TSH WNL    CRP < 3      ESR 47    Troponin 244    COVID19 PCR negative    Imaging:  CT C spine w/ IV cont pending    MRI R brachial plexus 11/1: Grossly unremarkable right brachial plexus. Moderate levoscoliosis of the cervical spine. Severe right facet arthropathy at C3-4, C5-6 and C7-T1 with marked associated edema and enhancement at C3-C4, and possible early ankylosis at C5-6 and C7-T1.    MRI complete spine 10/31: No evidence of osseous metastases. Cervical spine w/ small rim enhancing collection posterior to the C6 spinous process measuring 1.4 x 1.6 x 1 cm question possible, soft tissue lesion or abscess. No evidence of osseous metastases. Mild disc degeneration at C6/7 with loss of disc height and signals nucleus pulposus. Mild narrowing of the R C4-5, moderate narrowing of the R C5-6 and marked narrowing of the B/L C6-7 neural foramina due to uncovertebral spurring and facet osteophytic hypertrophy. Thoracic spine w/ focal kyphosis at T8-9. Thoracic vertebral body heights are significant for chronic compression deformities of T8-T9 with loss of 50 and 80% of vertebral body height and evidence of prior kyphoplasty. Tiny R paracentral disc herniation at T9-T10. Lumbar spine  grade 1 posterior spondylolisthesis at L2-3 and grade 1 anterior spondylolisthesis at L4-5 on a degenerative basis. Grade 1 posterior spondylolisthesis at L5-S1 appears to be due to B/L L5 spondylolysis. Moderate to severe degenerative disc disease and spondylosis at L2-3 through L5-S1 with loss of disc height and associated degenerative endplate changes. Disc bulges at L2-3 through L5-S1 flatten the ventral thecal sac and narrow the B/L neural foramina. Moderate to severe central stenosis at L3-4 and L4-5 on a degenerative basis due to disc bulge, facet osteophytic hypertrophy and redundancy of ligamentum flavum. Marked facet osteophytic hypertrophy also noted at L5-S1.    MRA head and neck 10/31: Carotid neck circulation intact. Vertebral neck circulation intact. Anterior intracranial circulation with atherosclerosis cavernous carotid segments of the internal carotid arteries. Posterior intracranial circulation intact.     MRI head w/wo contrast 10/31: No evidence of acute infarction. Bilateral cerebellar remote infarctions. Ischemic white matter disease lower range typical for age. Atrophy typical for age. Small pericallosal lipoma.    CT C spine wo 10/29: No acute cervical spine fracture or traumatic malalignment. Multilevel cervical spondylosis.    CT head wo 10/29: No acute intracranial hemorrhage or mass affect. Chronic small vessel ischemic changes and lacunar infarcts.     CXR 10/29: Question blastic metastatic disease of a lower thoracic body. No acute lung finding.     R hand xray 10/29/22: No fracture of the right hand.    Cardiac Testing:  Echo 11/1/22: Significant fibrocalcific changes noted to the aortic valve leaflets with restriction in leaflet excursion. Calculated LAURA is .85cm2 ; this finding is consistent with severe aortic stenosis. The left atrium is moderately dilated. Mild concentric left ventricular hypertrophy is present. Moderately reduced left ventricular systolic function with inferior wall akinesis. Estimated left ventricular ejection fraction is 40-45 %. The IVC appears normal. Moderate (2+) mitral regurgitation is present. Mild mitral annular calcification is present. Fibrocalcific changes noted to the mitral valve repair-posterior leaflet with reduced leaflet excursion. No evidence of pericardial effusion. No evidence of pleural effusion. Normal appearing pulmonic valve structure and function. Normal appearing right atrium. Normal appearing right ventricle structure and function. Mild (1+) tricuspid valve regurgitation is present.    Tele 11/1: Sinus 60-80, 5 beats aberrancy triplet    Tele 10/31: Sinus 70-90, PVCs, couplets    Tele 10/30: Sinus 60-80, PVCs, couplets    Meds:  MEDICATIONS  (STANDING):  aspirin enteric coated 81 milliGRAM(s) Oral daily  calcium carbonate 1250 mG  + Vitamin D (OsCal 500 + D) 1 Tablet(s) Oral daily  heparin   Injectable 5000 Unit(s) SubCutaneous every 8 hours  levothyroxine 100 MICROGram(s) Oral daily  metoprolol succinate ER 25 milliGRAM(s) Oral daily  multivitamin/minerals 1 Tablet(s) Oral daily  pravastatin 40 milliGRAM(s) Oral at bedtime  sucralfate 1 Gram(s) Oral two times a day    MEDICATIONS  (PRN):  acetaminophen     Tablet .. 325 milliGRAM(s) Oral every 4 hours PRN Mild Pain (1 - 3)  acetaminophen     Tablet .. 650 milliGRAM(s) Oral every 4 hours PRN Temp greater or equal to 38C (100.4F), Moderate Pain (4 - 6)  aluminum hydroxide/magnesium hydroxide/simethicone Suspension 30 milliLiter(s) Oral every 4 hours PRN Dyspepsia  artificial  tears Solution 1 Drop(s) Both EYES two times a day PRN Dry Eyes  melatonin 3 milliGRAM(s) Oral at bedtime PRN Insomnia  ondansetron Injectable 4 milliGRAM(s) IV Push every 8 hours PRN Nausea and/or Vomiting

## 2022-11-03 NOTE — PROGRESS NOTE ADULT - ASSESSMENT
88 y/o female with h/o recently diagnosed right breat mass, prior TIA, RA, lumbar spine stenosis, HL, hypothyroidism, recent COVID was admitted on 10/29 from [facility] living [facility] for right hand fingers paralysis. The patient's symptoms started the night PTA. After her symptoms did not improve she was referred to the emergency room. The patient was recently diagnosed with right breast mass and biopsy was recommended and was scheduled as an outpatient. On 10/31 she underwent MRI of cervical spine and was reported with a small rim enhancing collection posterior to the C6 spinous process measuring 1.4 x 1.6 x 1 cm. The question was raised for possible soft tissue lesion or abscess.       1. Possible C6 spinous process collection ?possible abscess. Right breast mass ?malignancy.   -no clinical signs of sepsis  -no C6 spine area tenderness or erythema  -cannot exclude an infectious process in matthew of MRI finding  -case reviewed with Dr. Jean Baptiste - unlikely infectious process  -would consider tissue biopsy for pathology and culture by IR for further diagnosis  -continue to observe off abx at present time  -monitor temps  -f/u CBC  -supportive care  2. Other issues:   -care per medicine    d/w medical team

## 2022-11-03 NOTE — PRE PROCEDURE NOTE - PRE PROCEDURE EVALUATION
88yo F with C6 collection/mass referred to IR for biopsy/culture  - Pt seen at bedside, agreeable to procedure with local anesthesia   - Labs OK, Covid negative  - Reviewed with Dr. Fuentes, plan for IR procedure either today or tomorrow, pending availability

## 2022-11-03 NOTE — PROGRESS NOTE ADULT - PROBLEM SELECTOR PROBLEM 4
PAF (paroxysmal atrial fibrillation)
no pain, swelling or deformity of joints

## 2022-11-03 NOTE — PROGRESS NOTE ADULT - ASSESSMENT
89 year old woman with hx of mitral valve repair, paroxysmal atrial fibrillation, hx of TIA, chronic unsteady gait, hx of fall, hx of intracranial hemorrhage, no longer on AC, lumbar degenerative changes, spinal stenosis, and R breast mass for which she declined biopsy (given that she would decline surgery, chemo etc if found to be cancer), presented for further evaluation of R wrist weakness, no other focal complaints. R wrist weakness did start suddenly, while awake. She did on ROS also note chronic, progressively worsening generalized weakness over a period of months. No other complaints. In the ED, CT head was negative for hemorrhage, negative for acute territorial infarction. Placed on tele and admitted to medicine for further management.     R wrist weakness  Symptoms started suddenly while she was awake. DDx included stroke affecting right hand cortical representation, metastatic lesion, radial neuropathy, brachial plexopathy. Appreciate input from Neurology. MRI brain does not show any acute stroke. Old infarcts seen on MRI brain. Now on aspirin. No metastatic disease seen in spine or brachial plexus. No pathology to suggest wrist/hand weakness. A1c WNL. TSH WNL. 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 Ortho and OT evals appreciated.   - Continue restorative PT/OT sessions  - Continue wrist splint    Possible incidental soft tissue lesion vs collection at the level of the C spine adjacent to C6 spinous process  Appreciate input from ID who suggested IR evaluation for possible aspiration.    - F/u with IR    Elevated troponin  Likely myocardial demand ischemia, unable to rule out infarction. TTE with moderately reduced left ventricular systolic function with inferior wall akinesis. Estimated left ventricular ejection fraction is 40-45 %. Underwent cardiac cath today, found to have non occlusive coronary artery disease. Interventional Cardiology recommending medical management.   - Continue aspirin, statin, beta blocker    Severe aortic stenosis  As noted on TTE and subsequent cardiac cath.   - Structural heart disease outpatient follow up with Dr. Ramirez on 11/16.     Paroxysmal atrial fibrillation  NSR, rate WNL. On Metoprolol. Not on AC due to hx of past falls, past ICH.   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     Normocytic anemia  Likely chronic. Likely anemia of chronic disease. No overt bleeding. No signs of hemolysis.   - Will check retic, LDH, haptoglobin, iron studies, ferritin, b12          DVT ppx: Heparin subcut  Dispo: Anticipate DC to [facility] though she is currently residing at [facility]     89 year old woman with hx of mitral valve repair, paroxysmal atrial fibrillation, hx of TIA, chronic unsteady gait, hx of fall, hx of intracranial hemorrhage, no longer on AC, lumbar degenerative changes, spinal stenosis, and R breast mass for which she declined biopsy (given that she would decline surgery, chemo etc if found to be cancer), presented for further evaluation of R wrist weakness, no other focal complaints. R wrist weakness did start suddenly, while awake. She did on ROS also note chronic, progressively worsening generalized weakness over a period of months. No other complaints. In the ED, CT head was negative for hemorrhage, negative for acute territorial infarction. Placed on tele and admitted to medicine for further management.     R wrist weakness  Symptoms started suddenly while she was awake. DDx included stroke affecting right hand cortical representation, metastatic lesion, radial neuropathy, brachial plexopathy. Appreciate input from Neurology. MRI brain did not show any acute stroke or metastatic disease. MR brachial plexus also without finding to explain her R wrist/hand weakness. A1c WNL. TSH WNL. 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 Ortho and OT evals appreciated. Patient's R wrist weakness has been gradually improving.  - Continue restorative PT/OT sessions  - Continue cock-up wrist splint  - NWB to R hand  - Outpatient follow up with Dr. Alicea 1 week post-discharge    Possible incidental soft tissue lesion vs collection at the level of the C spine adjacent to C6 spinous process  Appreciate input from ID. Advised IR evaluation for possible sampling. Patient in agreement with sampling as this could 1) rule out infection 2) provide prognostic implications if a metastatic focus (she would not want treatment if a malignancy. Today, IR was unable to visualize the finding via ultrasound. CT C spine with IV contrast is now ordered per their recommendation with plan to sample site tomorrow, if possible.   - F/u CT C spine w/ IV contrast  - F/u with IR tomorrow re possible sampling (plan was for local anesthesia, no need for NPO per IR)    Elevated troponin  Likely myocardial demand ischemia, unable to rule out infarction. TTE with moderately reduced left ventricular systolic function with inferior wall akinesis. Estimated left ventricular ejection fraction is 40-45 %. Underwent cardiac cath today, found to have non occlusive coronary artery disease. Interventional Cardiology recommending medical management.   - Continue aspirin, statin, beta blocker    Severe aortic stenosis  As noted on TTE and subsequent cardiac cath.   - Structural heart disease outpatient follow up with Dr. Ramirez on 11/16.     Paroxysmal atrial fibrillation  NSR, rate WNL. On Metoprolol. Not on AC due to hx of past falls, past ICH.   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     Normocytic anemia  Likely anemia of chronic disease. May have some overlap with iron deficiency. No overt bleeding. No signs of hemolysis.   - Will continue to monitor      Diet: DASH  DVT ppx: Heparin subcut  Code Status: Full  Dispo: Anticipate DC to [facility] in 24-48 hrs pending possible sampling of this possible lesion vs collection adjacent to C6 spinous process     89 year-old woman with hx of mitral valve repair, paroxysmal atrial fibrillation, hx of TIA, chronic unsteady gait, hx of fall, hx of intracranial hemorrhage, no longer on AC, lumbar degenerative changes, spinal stenosis, and R breast mass for which she declined biopsy (given that she would decline surgery, chemo etc if found to be cancer), presented here for further evaluation of R wrist weakness, no other focal complaints. R wrist weakness did start suddenly, while awake. She did on ROS also note chronic, progressively worsening generalized weakness over a period of months. No other complaints. In the ED, CT head was negative for hemorrhage, negative for acute territorial infarction. She was placed on tele and admitted to Medicine for further management.     R wrist weakness  Symptoms started suddenly while she was awake. DDx included stroke affecting right hand cortical representation, metastatic lesion, radial nerve neuropathy, brachial plexopathy. Appreciate input from Neurology. MRI brain did not show any acute stroke or metastatic disease. MR brachial plexus also without finding to explain her R wrist/hand weakness. A1c WNL. TSH WNL. 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 Ortho and OT evals appreciated. Patient's R wrist weakness has been gradually improving.  - Continue restorative PT/OT sessions  - Continue cock-up wrist splint  - NWB to R hand  - Outpatient follow up with Dr. Alicea 1 week post-discharge    Possible incidental soft tissue lesion vs collection at the level of the C spine adjacent to C6 spinous process  Appreciate input from ID. Advised IR evaluation for possible sampling. Patient in agreement with sampling as this could 1) rule out infection 2) provide prognostic implications if a metastatic focus (she would not want treatment if a malignancy but would want to know prognosis if possible). Today, IR was unable to visualize the finding via ultrasound. CT C spine with IV contrast is now ordered per their recommendation, with plan to sample site tomorrow, if possible.   - F/u CT C spine w/ IV contrast  - F/u with IR tomorrow re possible sampling (plan was for local anesthesia, no need for NPO per IR)    Elevated troponin  Likely myocardial demand ischemia, unable to rule out infarction. TTE with moderately reduced left ventricular systolic function with inferior wall akinesis. Estimated left ventricular ejection fraction is 40-45 %. Underwent cardiac cath today, found to have non occlusive coronary artery disease. Interventional Cardiology recommending medical management.   - Continue aspirin, statin, beta blocker    Severe aortic stenosis  As noted on TTE and subsequent cardiac cath.   - Structural heart disease outpatient follow up with Dr. Ramirez on 11/16.     Paroxysmal atrial fibrillation  NSR, rate WNL. On Metoprolol. Not on AC due to hx of past falls, past ICH.   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     Normocytic anemia  Likely anemia of chronic disease. May have some overlap with iron deficiency. No overt bleeding. No signs of hemolysis.   - Will continue to monitor      Diet: DASH  DVT ppx: Heparin subcut  Code Status: Full  Dispo: Anticipate DC to [facility] in 24-48 hrs pending possible sampling of this possible lesion vs collection adjacent to C6 spinous process

## 2022-11-03 NOTE — PROGRESS NOTE ADULT - PROBLEM SELECTOR PLAN 3
Controlled
controlled , continue low salt diet , medication.
controlled , continue low salt diet , medication.

## 2022-11-03 NOTE — PROGRESS NOTE ADULT - PROBLEM SELECTOR PLAN 4
Tele currently SR with 5 beat NSVT  AC discontinued over a year ago due to multiple falls with ICH, continue asa, BB
Tele SR No AC 2/2 past multiple falls with prior ICH  Continue ASA/BB
Tele currently SR   Patient S/P multiple falls with ICH was taken off AC by her cardiologist   if her MRI showed evidence of acute infarct will reassess for restarting her on AC   continue metoprolol.

## 2022-11-03 NOTE — PROGRESS NOTE ADULT - PROBLEM SELECTOR PLAN 2
Elevated troponin Echo shows moderately reduced LV FX EF 40-45% with inferior wall akinesis.   S/P CC non obstructive disease; continue with medical management   Continue ASA/Statin/BB     LAURA .85 c/w severe AS; has OPT follow up with Dr Ramirez Structural heart disease on 11/16.

## 2022-11-03 NOTE — PROGRESS NOTE ADULT - PROBLEM SELECTOR PLAN 5
Prior MV repair  with PSM , see above
Prior MV repair  with [facility] , will obtain echo
Echo reviewed see above

## 2022-11-03 NOTE — PROGRESS NOTE ADULT - NUTRITIONAL ASSESSMENT
This patient has been assessed with a concern for Malnutrition and has been determined to have a diagnosis/diagnoses of Severe protein-calorie malnutrition.    This patient is being managed with:   Diet DASH/TLC-  Sodium & Cholesterol Restricted  Entered: Oct 30 2022  8:41AM    
This patient has been assessed with a concern for Malnutrition and has been determined to have a diagnosis/diagnoses of Severe protein-calorie malnutrition.    This patient is being managed with:   Diet DASH/TLC-  Sodium & Cholesterol Restricted  Entered: Oct 30 2022  8:41AM    
This patient has been assessed with a concern for Malnutrition and has been determined to have a diagnosis/diagnoses of Severe protein-calorie malnutrition.    This patient is being managed with:   Diet NPO after Midnight-     NPO Start Date: 01-Nov-2022   NPO Start Time: 23:59  Except Medications  With Ice Chips/Sips of Water  Entered: Nov 1 2022  4:35PM    Diet DASH/TLC-  Sodium & Cholesterol Restricted  Entered: Oct 30 2022  8:41AM    
This patient has been assessed with a concern for Malnutrition and has been determined to have a diagnosis/diagnoses of Severe protein-calorie malnutrition.    This patient is being managed with:   Diet DASH/TLC-  Sodium & Cholesterol Restricted  Entered: Oct 30 2022  8:41AM    
This patient has been assessed with a concern for Malnutrition and has been determined to have a diagnosis/diagnoses of Severe protein-calorie malnutrition.    This patient is being managed with:   Diet DASH/TLC-  Sodium & Cholesterol Restricted  Entered: Oct 30 2022  8:41AM    
This patient has been assessed with a concern for Malnutrition and has been determined to have a diagnosis/diagnoses of Severe protein-calorie malnutrition.    This patient is being managed with:   Diet NPO after Midnight-     NPO Start Date: 01-Nov-2022   NPO Start Time: 23:59  Except Medications  With Ice Chips/Sips of Water  Entered: Nov 1 2022  4:35PM    Diet DASH/TLC-  Sodium & Cholesterol Restricted  Entered: Oct 30 2022  8:41AM    
This patient has been assessed with a concern for Malnutrition and has been determined to have a diagnosis/diagnoses of Severe protein-calorie malnutrition.    This patient is being managed with:   Diet DASH/TLC-  Sodium & Cholesterol Restricted  Entered: Oct 30 2022  8:41AM

## 2022-11-03 NOTE — PROGRESS NOTE ADULT - SUBJECTIVE AND OBJECTIVE BOX
Patient seen and examined this morning, no acute events overnight. Reports she feels he wrist has regained some of its strength. She had removed the wrist splint overnight but reports she will wear it today as recommended     Vital Signs Last 24 Hrs  T(C): 36.9 (02 Nov 2022 19:06), Max: 36.9 (02 Nov 2022 19:06)  T(F): 98.5 (02 Nov 2022 19:06), Max: 98.5 (02 Nov 2022 19:06)  HR: 77 (02 Nov 2022 19:06) (66 - 88)  BP: 109/55 (02 Nov 2022 19:06) (97/85 - 144/58)  BP(mean): --  RR: 18 (02 Nov 2022 19:06) (16 - 18)  SpO2: 93% (02 Nov 2022 19:06) (93% - 100%)    Parameters below as of 02 Nov 2022 19:06  Patient On (Oxygen Delivery Method): room air    LABS:      Ca    8.5        02 Nov 2022 07:09      Physical Exam:  General:   No acute distress, AAOx3.  Musculoskeletal:  Right hand/wrist:  No swelling, no ecchymosis, no erythema, skin intact, normothermic. No TTP. Limited ROM of wrist, able to dorsiflex  just past neutral; unable to dorsiflex against resistance. Able to extend the thumb, unable to extend against resistance. Weak Radial nerve/PIN. +AIN/Median nerve/Ulnar nerve/Musculocutaneous. Able to range at all IP and MCPs. SILT throughout. Radial pulse 2+, cap refill <2secs.    Assessment:  89y Female with right wrist drop; improving.     Plan:  Overall wrist extension is improving compared to prior examination  possible radial nerve palsy given improvement   No acute orthopedic intervention indicated at this time.  Cock-up splint applied to the right wrist.  NWB Right hand.  Neurology and Ortho spine on board; consult notes appreciated.  Follow-up with Dr. Alicea 1 week after discharge.     Case discussed with Dr. Alicea who agrees with plan. Patient seen and examined this morning, no acute events overnight. Reports she feels he wrist has regained some of its strength. She had removed the wrist splint overnight but reports she will wear it today as recommended     Vital Signs Last 24 Hrs  T(C): 36.9 (02 Nov 2022 19:06), Max: 36.9 (02 Nov 2022 19:06)  T(F): 98.5 (02 Nov 2022 19:06), Max: 98.5 (02 Nov 2022 19:06)  HR: 77 (02 Nov 2022 19:06) (66 - 88)  BP: 109/55 (02 Nov 2022 19:06) (97/85 - 144/58)  BP(mean): --  RR: 18 (02 Nov 2022 19:06) (16 - 18)  SpO2: 93% (02 Nov 2022 19:06) (93% - 100%)    Parameters below as of 02 Nov 2022 19:06  Patient On (Oxygen Delivery Method): room air    LABS:      Ca    8.5        02 Nov 2022 07:09      Physical Exam:  General:   No acute distress, AAOx3.  Musculoskeletal:  Right hand/wrist:  No swelling, no ecchymosis, no erythema, skin intact, normothermic. No TTP. Limited ROM of wrist, able to dorsiflex  just past neutral; unable to dorsiflex against resistance. Able to extend the thumb, unable to extend against resistance. Weak Radial nerve/PIN. +AIN/Median nerve/Ulnar nerve/Musculocutaneous. Able to range at all IP and MCPs. SILT throughout. Radial pulse 2+, cap refill <2secs.    Assessment:  89y Female with right wrist drop; improving.     Plan:  Overall wrist extension is improving compared to prior examination  possible radial nerve palsy given improvement   No acute orthopedic intervention indicated at this time.  Cock-up splint applied to the right wrist.  WBAT Right hand.  Neurology and Ortho spine on board; consult notes appreciated.  Follow-up with Dr. Alicea 1 week after discharge.     Case discussed with Dr. Alicea who agrees with plan.

## 2022-11-03 NOTE — PROGRESS NOTE ADULT - SUBJECTIVE AND OBJECTIVE BOX
CHIEF COMPLAINT:    HPI:  HPI: The patient is an 89-year-old female with hx of  MV repair . PAF  unsteady gait ,prior TIAs  fall prior ICH  no on anticoagulation , who was sent to the emergency room from [facility] because she developed right hand to finger paralysis.  The patient's symptoms started last night and she did not want to come to the ER last night waiting for her symptoms to improve in the morning.  After her symptoms did not improve it was referred to the emergency room. The patient was diagnosed with Covid 19 infection a month ago.    Patient  denies any chest pain or shortness of breath or dizziness , patient blood work showed elevated troponin  without uptrending ,   her blood pressure was normal          . The patient was recently diagnosed with right breast mass and biopsy was recommended and scheduled as an outpatient.  The patient is also complaining of a Baker's cyst in the right knee which she wants to be evaluated for by orthopedics.    PMHx: Mitral valve repair  PAF  off anticoagulation due to fall with ICH hemorrhage more than one year ago , Recently diagnosed right breast mass, TIA ×4, rheumatoid arthritis, lumbar spinal stenosis, HLD, hypothyroidism,    PSHx: Mitral valve repair     Family Hx: Father  from kidney failure at age 99, patient mother had DJD    Social Hx.: not smoking, no alcohol, Moved into [facility] 6 months ago from [place].    10/31/22 awake alert feeling well Tele SR   22: Sitting in chair at bedside, offers no complaints of cp or sob.  Unable to move fingers in right hand; Tele: sinus rhythm with 5 beat VT overnight  11/3/22 Seated in bedside chair no complaints Tele SR    Allergies    cinnamon (Other)  Drug Allergies Not Recorded    Intolerances      MEDICATIONS  (STANDING):  aspirin enteric coated 81 milliGRAM(s) Oral daily  calcium carbonate 1250 mG  + Vitamin D (OsCal 500 + D) 1 Tablet(s) Oral daily  heparin   Injectable 5000 Unit(s) SubCutaneous every 8 hours  levothyroxine 100 MICROGram(s) Oral daily  metoprolol succinate ER 25 milliGRAM(s) Oral daily  multivitamin/minerals 1 Tablet(s) Oral daily  pravastatin 40 milliGRAM(s) Oral at bedtime  sucralfate 1 Gram(s) Oral two times a day    MEDICATIONS  (PRN):  acetaminophen     Tablet .. 325 milliGRAM(s) Oral every 4 hours PRN Mild Pain (1 - 3)  acetaminophen     Tablet .. 650 milliGRAM(s) Oral every 4 hours PRN Temp greater or equal to 38C (100.4F), Moderate Pain (4 - 6)  aluminum hydroxide/magnesium hydroxide/simethicone Suspension 30 milliLiter(s) Oral every 4 hours PRN Dyspepsia  artificial  tears Solution 1 Drop(s) Both EYES two times a day PRN Dry Eyes  melatonin 3 milliGRAM(s) Oral at bedtime PRN Insomnia  ondansetron Injectable 4 milliGRAM(s) IV Push every 8 hours PRN Nausea and/or Vomiting      Vital Signs Last 24 Hrs  T(C): 36.4 (2022 08:10), Max: 36.9 (2022 19:06)  T(F): 97.6 (2022 08:10), Max: 98.5 (2022 19:06)  HR: 70 (2022 08:10) (70 - 79)  BP: 134/50 (2022 08:10) (109/55 - 144/58)  BP(mean): --  RR: 18 (2022 08:10) (16 - 18)  SpO2: 99% (2022 08:10) (93% - 99%)    Parameters below as of 2022 08:10  Patient On (Oxygen Delivery Method): room air                I&O's Summary      PHYSICAL EXAM:    Constitutional: NAD, awake and alert, well-developed  HEENT: PERR, EOMI,  No oral cyananosis.  Neck:  supple,  No JVD  Respiratory: Breath sounds are clear bilaterally  Cardiovascular: S1 and S2, RRR + Sys Murmur   Gastrointestinal: Abd soft, nontender.   Extremities: No LE Edema  Neurological: A/O x 3, unsteady gait   Musculoskeletal: arthritic changes , unsteady gait  no calf tenderness.  Skin: No rashes.      LABS: All Labs Reviewed:                          9.8    5.33  )-----------( 245      ( 31 Oct 2022 06:55 )             30.5                           9.9    4.69  )-----------( 230      ( 30 Oct 2022 06:52 )             32.0                         10.6   6.39  )-----------( 281      ( 29 Oct 2022 13:23 )             32.5     10-31    138  |  106  |  22  ----------------------------<  93  3.9   |  26  |  0.60    Ca    8.7      31 Oct 2022 06:55        30 Oct 2022 06:52    136    |  104    |  24     ----------------------------<  100    4.2     |  27     |  0.65   29 Oct 2022 13:23    135    |  104    |  27     ----------------------------<  80     4.3     |  26     |  0.67     Ca    8.9        30 Oct 2022 06:52  Ca    9.2        29 Oct 2022 13:23    TPro  7.8    /  Alb  3.3    /  TBili  0.3    /  DBili  x      /  AST  18     /  ALT  23     /  AlkPhos  56     29 Oct 2022 13:23    PT/INR - ( 29 Oct 2022 13:23 )   PT: 11.3 sec;   INR: 0.97 ratio         PTT - ( 29 Oct 2022 13:23 )  PTT:27.5 sec    - TroponinI hsT: <-245.03, <-256.37    Blood Culture:     10-30 @ 06:52  TSH: 2.33      RADIOLOGY/EKG:  < from: 12 Lead ECG (10.29.22 @ 14:07) >  inus rhythm with occasional Premature ventricular complexes  Non-specific intra-ventricular conduction block  Abnormal ECG  No previous ECGs available  Confirmed by MD ARJUN, SARAN ([id number]) on 10/30/2022 8:47:28 AM    < end of copied text >    Monitor sinus rhythm PVCS    < from: TTE Echo Complete w/o Contrast w/ Doppler (22 @ 08:21) >   Impression     Summary     Significant fibrocalcific changes noted to the aortic valve leaflets with   restriction in leaflet excursion. Calculated LAURA is .85cm2 ; this finding   is consistent with severe aortic stenosis.   The left atrium is moderately dilated.   Mild concentric left ventricular hypertrophy is present.   Moderately reduced left ventricular systolic function with inferior wall   akinesis.   Estimated left ventricular ejection fraction is 40-45 %.   The IVC appears normal.   Moderate (2+) mitral regurgitation is present.   Mild mitral annular calcification is present.   Fibrocalcific changes noted to the mitral valve repair-posterior leaflet   with reduced leaflet excursion.   No evidence of pericardial effusion.   No evidence of pleural effusion.  Normal appearing pulmonic valve structure and function.   Normal appearing right atrium.   Normal appearing right ventricle structure and function.   Mild (1+) tricuspid valve regurgitation is present.      Electronically signed by Radha Romero MD(Interpreting   physician) on 2022 10:05 AM              Cardiac Cath Diagnostic Conclusions     Non-obstructive coronary artery disease. EF 45% with LVEDP 9. LV-Ao   gradient 5.     Recommendations     Recommend aggressive medical management for non-obstructive CAD. Patient   to follow up in structural heart office at [facility] regarding   further investigation of low gradient aortic stenosis. Findings relayed   to   patient and patient's cardiologist.    Estimated Blood Loss:5ml.    Complications:  No complication.     Signatures     ----------------------------------------------------------------   Electronically signed by MD Daniel Ramirez(Performing Physician)   on 2022 16:22           REASON FOR VISIT: AS    HPI: 89-year-old woman with a history of MV repair, PAF (not anticoagulated due to unsteady gait / fall / past ICH), ,prior TIAs admitted with R hand finger weakness; found to have cervical spine lesion. Cardiology called for elevated troponin.    10/31/22 awake alert feeling well Tele SR   11/1/22: Sitting in chair at bedside, offers no complaints of cp or sob.  Unable to move fingers in right hand; Tele: sinus rhythm with 5 beat VT overnight  11/3/22 Seated in bedside chair no complaints Tele SR    MEDICATIONS  (STANDING):  aspirin enteric coated 81 milliGRAM(s) Oral daily  calcium carbonate 1250 mG  + Vitamin D (OsCal 500 + D) 1 Tablet(s) Oral daily  heparin   Injectable 5000 Unit(s) SubCutaneous every 8 hours  levothyroxine 100 MICROGram(s) Oral daily  metoprolol succinate ER 25 milliGRAM(s) Oral daily  multivitamin/minerals 1 Tablet(s) Oral daily  pravastatin 40 milliGRAM(s) Oral at bedtime  sucralfate 1 Gram(s) Oral two times a day    MEDICATIONS  (PRN):  acetaminophen     Tablet .. 325 milliGRAM(s) Oral every 4 hours PRN Mild Pain (1 - 3)  acetaminophen     Tablet .. 650 milliGRAM(s) Oral every 4 hours PRN Temp greater or equal to 38C (100.4F), Moderate Pain (4 - 6)  aluminum hydroxide/magnesium hydroxide/simethicone Suspension 30 milliLiter(s) Oral every 4 hours PRN Dyspepsia  artificial  tears Solution 1 Drop(s) Both EYES two times a day PRN Dry Eyes  melatonin 3 milliGRAM(s) Oral at bedtime PRN Insomnia  ondansetron Injectable 4 milliGRAM(s) IV Push every 8 hours PRN Nausea and/or Vomiting    Vital Signs Last 24 Hrs  T(C): 36.4 (03 Nov 2022 08:10), Max: 36.9 (02 Nov 2022 19:06)  T(F): 97.6 (03 Nov 2022 08:10), Max: 98.5 (02 Nov 2022 19:06)  HR: 70 (03 Nov 2022 08:10) (70 - 79)  BP: 134/50 (03 Nov 2022 08:10) (109/55 - 144/58)  RR: 18 (03 Nov 2022 08:10) (16 - 18)  SpO2: 99% (03 Nov 2022 08:10) (93% - 99%)    PHYSICAL EXAM:  Constitutional: NAD, awake and alert, seated in chair eating lunch  Respiratory: Breath sounds are clear bilaterally  Cardiovascular: S1 and S2, RRR + Sys Murmur   Gastrointestinal: Abd soft, nontender.   Extremities: No LE Edema  Skin: No rashes.    LABS:               9.7    5.57  )-----------( 226      ( 03 Nov 2022 07:04 )             30.6       137  |  107  |  19  ----------------------------<  98  4.0   |  25  |  0.60    Ca    8.6      03 Nov 2022 07:04    TroponinI hsT: <-243.98, <-245.03, <-256.37           12 Lead ECG (10.29.22 @ 14:07):  Sinus rhythm with occasional Premature ventricular complexes  Non-specific intra-ventricular conduction block  Abnormal ECG  No previous ECGs available    TTE Echo Complete w/o Contrast w/ Doppler (11.01.22 @ 08:21) >   Significant fibrocalcific changes noted to the aortic valve leaflets with restriction in leaflet excursion. Calculated LAURA is .85cm2 ; this finding is consistent with severe aortic stenosis.   The left atrium is moderately dilated.   Mild concentric left ventricular hypertrophy is present. Moderately reduced left ventricular systolic function with inferior wall akinesis.   Estimated left ventricular ejection fraction is 40-45 %.   Moderate (2+) mitral regurgitation is present.    Mild (1+) tricuspid valve regurgitation is present.    Cardiac Cath Diagnostic Conclusions:   Non-obstructive coronary artery disease. EF 45% with LVEDP 9. LV-Ao gradient 5.   Recommendations:   Recommend aggressive medical management for non-obstructive CAD. Patient to follow up in structural heart office at [facility] regarding further investigation of low gradient aortic stenosis.

## 2022-11-03 NOTE — PROGRESS NOTE ADULT - NS ATTEND AMEND GEN_ALL_CORE FT
Agree with above. Non-obstructive coronary artery disease with low gradient AS. Patient to follow up in structural heart office at [facility]. Findings relayed to patient and patient's cardiologist.     Thank you for allowing me to participate in the care of your patient. Feel free to contact me if any clinical issues arise.    Daniel Ramirez MD, FAC, [facility]   Director, Interventional Cardiology, [facility]  Faculty Interventional Cardiologist, [facility] Office: [phone]  [place] Office: [phone]  Email: [email or web address]
patient seen and examined -- she has outpatient f/u with Dr Ramirez to manage low-gradient AS; will f/u as needed for remainder of hospitalization; stable from cardiac standpoint for tissue biopsy.

## 2022-11-03 NOTE — PROGRESS NOTE ADULT - SUBJECTIVE AND OBJECTIVE BOX
Date of service: 11-03-22 @ 12:22    Lying in bed in NAD  Alert and verbal  Denies neck pain  No fever    ROS: no fever or chills; denies dizziness, no HA, no SOB or cough, no abdominal pain, no diarrhea or constipation; no dysuria, no legs pain, no rashes    MEDICATIONS  (STANDING):  aspirin enteric coated 81 milliGRAM(s) Oral daily  calcium carbonate 1250 mG  + Vitamin D (OsCal 500 + D) 1 Tablet(s) Oral daily  heparin   Injectable 5000 Unit(s) SubCutaneous every 8 hours  levothyroxine 100 MICROGram(s) Oral daily  metoprolol succinate ER 25 milliGRAM(s) Oral daily  multivitamin/minerals 1 Tablet(s) Oral daily  pravastatin 40 milliGRAM(s) Oral at bedtime  sucralfate 1 Gram(s) Oral two times a day    Vital Signs Last 24 Hrs  T(C): 36.4 (03 Nov 2022 08:10), Max: 36.9 (02 Nov 2022 19:06)  T(F): 97.6 (03 Nov 2022 08:10), Max: 98.5 (02 Nov 2022 19:06)  HR: 70 (03 Nov 2022 08:10) (70 - 79)  BP: 134/50 (03 Nov 2022 08:10) (109/55 - 144/58)  BP(mean): --  RR: 18 (03 Nov 2022 08:10) (16 - 18)  SpO2: 99% (03 Nov 2022 08:10) (93% - 99%)    Parameters below as of 03 Nov 2022 08:10  Patient On (Oxygen Delivery Method): room air         Physical exam:    Constitutional:  No acute distress  HEENT: NC/AT, EOMI, PERRLA, conjunctivae clear; ears and nose atraumatic  Neck: supple; thyroid not palpable  Back: no tenderness; no cervical spine tenderness; full ROM  Respiratory: respiratory effort normal; clear to auscultation  Cardiovascular: S1S2 regular, no murmurs  Abdomen: soft, not tender, not distended, positive BS  Genitourinary: no suprapubic tenderness  Lymphatic: no LN palpable  Musculoskeletal: no muscle tenderness, no joint swelling or tenderness  Extremities: no pedal edema  Neurological/ Psychiatric: AxOx3, moving all extremities  Skin: no rashes; no palpable lesions    Labs: reviewed                        9.9    5.61  )-----------( 241      ( 02 Nov 2022 07:09 )             30.8     11-02    138  |  107  |  23  ----------------------------<  105<H>  4.1   |  25  |  0.67    Ca    8.5      02 Nov 2022 07:09    C-Reactive Protein, Serum: <3 mg/L (10-31-22 @ 19:09)                        9.8    5.33  )-----------( 245      ( 31 Oct 2022 06:55 )             30.5     10-31    138  |  106  |  22  ----------------------------<  93  3.9   |  26  |  0.60    Ca    8.7      31 Oct 2022 06:55      Radiology: all available radiological tests reviewed    < from: MR Angio Neck No Cont (10.31.22 @ 12:09) >  1.  RIGHT CAROTID NECK CIRCULATION:   Intact.  2.  LEFT CAROTID NECK CIRCULATION:    Intact.  3.  VERTEBRAL NECK CIRCULATION:   Intact  4.  ANTERIOR INTRACRANIAL CIRCULATION:     Intracranial atherosclerosis   cavernous carotid segments of the internal carotid arteries, while.  5.  POSTERIOR INTRACRANIAL CIRCULATION:   Intact.  6.  BRAIN:    No evidence of acute infarction. Bilateral cerebellar   remote infarctions. Ischemic white matter disease lower range typical for   age. Atrophytypical for age. Small pericallosal lipoma.  < end of copied text >    < from: MR Cervical Spine w/wo IV Cont (10.31.22 @ 12:08) >      1.   Cervical spine:   Small rim enhancing collection posterior to   the C6 spinous process measuring 1.4 x 1.6 x 1 cm question possiblesoft   tissue lesion or abscess.   No evidence of osseous metastases. Mild disc   degeneration at C6/7 with loss of disc height and signals nucleus   pulposus. Mild narrowing of the RIGHT C4-5, moderate narrowing of the   RIGHT C5-6 and marked narrowing of the BILATERAL C6-7 neural foramina due   to uncovertebral spurring and facet osteophytic hypertrophy.        2.   Thoracic spine:   focal kyphosis at T8-9.  Thoracic vertebral   body heights are significant for chronic compression deformities of T8-T9   with loss of 50 and 80% of vertebral body height and evidence of prior   kyphoplasty.   Tiny RIGHT paracentral disc herniation at T9-T10.        3.   Lumbar spine:   Grade 1 posterior spondylolisthesis at L2-3 and   grade 1 anterior spondylolisthesis at L4-5 on a degenerative basis. Grade   1 posterior spondylolisthesis at L5-S1 appears to be due to BILATERAL L5   spondylolysis.   Moderate to severe degenerative disc disease and   spondylosis at L2-3 through L5-S1 with loss of disc height and associated   degenerative endplate changes. Disc bulges at L2-3 through L5-S1 flatten   the ventral thecal sac and narrow the BILATERAL neural foramina. Moderate   to severe central stenosis at L3-4 and L4-5 on a degenerative basis due   to discbulge, facet osteophytic hypertrophy and redundancy of ligamentum   flavum. Marked facet osteophytic hypertrophy also noted at L5-S1.  < end of copied text >      Advanced directives addressed: full resuscitation

## 2022-11-04 ENCOUNTER — TRANSCRIPTION ENCOUNTER (OUTPATIENT)
Age: 87
End: 2022-11-04

## 2022-11-04 VITALS
HEART RATE: 71 BPM | SYSTOLIC BLOOD PRESSURE: 140 MMHG | DIASTOLIC BLOOD PRESSURE: 59 MMHG | RESPIRATION RATE: 18 BRPM | TEMPERATURE: 98 F | OXYGEN SATURATION: 97 %

## 2022-11-04 PROCEDURE — 99239 HOSP IP/OBS DSCHRG MGMT >30: CPT

## 2022-11-04 RX ADMIN — Medication 81 MILLIGRAM(S): at 10:02

## 2022-11-04 RX ADMIN — HEPARIN SODIUM 5000 UNIT(S): 5000 INJECTION INTRAVENOUS; SUBCUTANEOUS at 06:14

## 2022-11-04 RX ADMIN — HEPARIN SODIUM 5000 UNIT(S): 5000 INJECTION INTRAVENOUS; SUBCUTANEOUS at 15:07

## 2022-11-04 RX ADMIN — Medication 1 DROP(S): at 10:00

## 2022-11-04 RX ADMIN — Medication 1 TABLET(S): at 10:02

## 2022-11-04 RX ADMIN — Medication 1 GRAM(S): at 10:03

## 2022-11-04 RX ADMIN — Medication 100 MICROGRAM(S): at 06:13

## 2022-11-04 RX ADMIN — Medication 25 MILLIGRAM(S): at 10:01

## 2022-11-04 RX ADMIN — Medication 1 DROP(S): at 15:07

## 2022-11-04 RX ADMIN — Medication 1 TABLET(S): at 10:01

## 2022-11-04 NOTE — PROGRESS NOTE ADULT - ASSESSMENT
89 y/o female with h/o recently diagnosed right breat mass, prior TIA, RA, lumbar spine stenosis, HL, hypothyroidism, recent COVID was admitted on 10/29 from [facility] living [facility] for right hand fingers paralysis. The patient's symptoms started the night PTA. After her symptoms did not improve she was referred to the emergency room. The patient was recently diagnosed with right breast mass and biopsy was recommended and was scheduled as an outpatient. On 10/31 she underwent MRI of cervical spine and was reported with a small rim enhancing collection posterior to the C6 spinous process measuring 1.4 x 1.6 x 1 cm. The question was raised for possible soft tissue lesion or abscess.       1. Possible C6 spinous process likely fibrotic process. Right breast mass ?malignancy.   -no clinical signs of sepsis  -no C6 spine area tenderness or erythema  -ne CT cervical spine is suggestive of C6 fibrotic process  -case reviewed with Dr. Jean Baptiste - unlikely infectious process  -continue to observe off abx at present time  -monitor temps  -f/u CBC  -supportive care  2. Other issues:   -care per medicine    d/w medical team and Dr. Yarbrough

## 2022-11-04 NOTE — PROGRESS NOTE ADULT - REASON FOR ADMISSION
Right wrist paralysis r/o CVA
R wrist weakness
Right wrist paralysis r/o CVA

## 2022-11-04 NOTE — DISCHARGE NOTE PROVIDER - NSDCCPCAREPLAN_GEN_ALL_CORE_FT
PRINCIPAL DISCHARGE DIAGNOSIS  Diagnosis: Right wrist drop  Assessment and Plan of Treatment: Use brace, follow up with Dr. Alicea in one week.      SECONDARY DISCHARGE DIAGNOSES  Diagnosis: Severe aortic stenosis  Assessment and Plan of Treatment: Follow up with Dr. Ramirez

## 2022-11-04 NOTE — DISCHARGE NOTE PROVIDER - CARE PROVIDER_API CALL
Ralph Alicea)  Orthopaedic Surgery; Surgery of the Hand  [address] [place] [address]  Phone: [phone]  Fax: [phone]  Follow Up Time: 1 week    Angelina Figueroa)  Internal Medicine  [address] [place] [address]  Phone: [phone]  Fax: [phone]  Follow Up Time: 1 week

## 2022-11-04 NOTE — DISCHARGE NOTE PROVIDER - PROVIDER TOKENS
PROVIDER:[TOKEN:[[id number]],FOLLOWUP:[1 week]],PROVIDER:[TOKEN:[[id number]],FOLLOWUP:[1 week]]

## 2022-11-04 NOTE — PROGRESS NOTE ADULT - SUBJECTIVE AND OBJECTIVE BOX
Date of service: 11-04-22 @ 14:01    Sitting in bed in NAD  No posterior neck pain  No fever    ROS: no fever or chills; denies dizziness, no HA, no SOB or cough, no abdominal pain, no diarrhea or constipation; no dysuria, no legs pain, no rashes    MEDICATIONS  (STANDING):  aspirin enteric coated 81 milliGRAM(s) Oral daily  calcium carbonate 1250 mG  + Vitamin D (OsCal 500 + D) 1 Tablet(s) Oral daily  heparin   Injectable 5000 Unit(s) SubCutaneous every 8 hours  levothyroxine 100 MICROGram(s) Oral daily  metoprolol succinate ER 25 milliGRAM(s) Oral daily  multivitamin/minerals 1 Tablet(s) Oral daily  pravastatin 40 milliGRAM(s) Oral at bedtime  sucralfate 1 Gram(s) Oral two times a day    Vital Signs Last 24 Hrs  T(C): 36.5 (04 Nov 2022 07:24), Max: 37 (03 Nov 2022 19:49)  T(F): 97.7 (04 Nov 2022 07:24), Max: 98.6 (03 Nov 2022 19:49)  HR: 71 (04 Nov 2022 07:24) (71 - 79)  BP: 140/59 (04 Nov 2022 07:24) (121/74 - 140/59)  BP(mean): --  RR: 18 (04 Nov 2022 07:24) (18 - 18)  SpO2: 97% (04 Nov 2022 07:24) (97% - 97%)    Parameters below as of 04 Nov 2022 07:24  Patient On (Oxygen Delivery Method): room air         Physical exam:    Constitutional:  No acute distress  HEENT: NC/AT, EOMI, PERRLA, conjunctivae clear; ears and nose atraumatic  Neck: supple; thyroid not palpable  Back: no tenderness; no cervical spine tenderness; full ROM  Respiratory: respiratory effort normal; clear to auscultation  Cardiovascular: S1S2 regular, no murmurs  Abdomen: soft, not tender, not distended, positive BS  Genitourinary: no suprapubic tenderness  Lymphatic: no LN palpable  Musculoskeletal: no muscle tenderness, no joint swelling or tenderness  Extremities: no pedal edema  Neurological/ Psychiatric: AxOx3, moving all extremities  Skin: no rashes; no palpable lesions    Labs: reviewed                        9.9    5.61  )-----------( 241      ( 02 Nov 2022 07:09 )             30.8     11-02    138  |  107  |  23  ----------------------------<  105<H>  4.1   |  25  |  0.67    Ca    8.5      02 Nov 2022 07:09    C-Reactive Protein, Serum: <3 mg/L (10-31-22 @ 19:09)                        9.8    5.33  )-----------( 245      ( 31 Oct 2022 06:55 )             30.5     10-31    138  |  106  |  22  ----------------------------<  93  3.9   |  26  |  0.60    Ca    8.7      31 Oct 2022 06:55      Radiology: all available radiological tests reviewed    < from: MR Angio Neck No Cont (10.31.22 @ 12:09) >  1.  RIGHT CAROTID NECK CIRCULATION:   Intact.  2.  LEFT CAROTID NECK CIRCULATION:    Intact.  3.  VERTEBRAL NECK CIRCULATION:   Intact  4.  ANTERIOR INTRACRANIAL CIRCULATION:     Intracranial atherosclerosis   cavernous carotid segments of the internal carotid arteries, while.  5.  POSTERIOR INTRACRANIAL CIRCULATION:   Intact.  6.  BRAIN:    No evidence of acute infarction. Bilateral cerebellar   remote infarctions. Ischemic white matter disease lower range typical for   age. Atrophytypical for age. Small pericallosal lipoma.  < end of copied text >    < from: MR Cervical Spine w/wo IV Cont (10.31.22 @ 12:08) >      1.   Cervical spine:   Small rim enhancing collection posterior to   the C6 spinous process measuring 1.4 x 1.6 x 1 cm question possiblesoft   tissue lesion or abscess.   No evidence of osseous metastases. Mild disc   degeneration at C6/7 with loss of disc height and signals nucleus   pulposus. Mild narrowing of the RIGHT C4-5, moderate narrowing of the   RIGHT C5-6 and marked narrowing of the BILATERAL C6-7 neural foramina due   to uncovertebral spurring and facet osteophytic hypertrophy.        2.   Thoracic spine:   focal kyphosis at T8-9.  Thoracic vertebral   body heights are significant for chronic compression deformities of T8-T9   with loss of 50 and 80% of vertebral body height and evidence of prior   kyphoplasty.   Tiny RIGHT paracentral disc herniation at T9-T10.        3.   Lumbar spine:   Grade 1 posterior spondylolisthesis at L2-3 and   grade 1 anterior spondylolisthesis at L4-5 on a degenerative basis. Grade   1 posterior spondylolisthesis at L5-S1 appears to be due to BILATERAL L5   spondylolysis.   Moderate to severe degenerative disc disease and   spondylosis at L2-3 through L5-S1 with loss of disc height and associated   degenerative endplate changes. Disc bulges at L2-3 through L5-S1 flatten   the ventral thecal sac and narrow the BILATERAL neural foramina. Moderate   to severe central stenosis at L3-4 and L4-5 on a degenerative basis due   to discbulge, facet osteophytic hypertrophy and redundancy of ligamentum   flavum. Marked facet osteophytic hypertrophy also noted at L5-S1.  < end of copied text >    < from: CT Cervical Spine w/ IV Cont (11.03.22 @ 20:20) >  IMPRESSION:   Degenerative changes as above.   Enhancing soft tissue   density again noted posterior to the C5-C7 spinous processes measuring   1.8 cm cc x 1.7 x 1.1 cm in axial dimension. A small ossicle is seen at   the superior aspect of this lesion. The margins are well-circumscribed   and this is felt to likely represent a benign process such as connective   tissue inflammation/fibrosis rather than a neoplastic process. Recommend   3-6 month follow-up CT scan with IV contrast to confirm stability.    < end of copied text >      Advanced directives addressed: full resuscitation

## 2022-11-04 NOTE — DISCHARGE NOTE PROVIDER - NSDCCAREPROVSEEN_GEN_ALL_CORE_FT
Skye Waldrop (hospital medicine)  Angela Thompson (neurology)  Esvin Sunshine (cardiology)  Matthew Henry (ID)

## 2022-11-04 NOTE — DISCHARGE NOTE PROVIDER - HOSPITAL COURSE
CC: R wrist paralysis  HPI and Hospital Course: 89 year-old woman with hx of mitral valve repair, paroxysmal atrial fibrillation, hx of TIA, chronic unsteady gait, hx of fall, hx of intracranial hemorrhage, no longer on AC, lumbar degenerative changes, spinal stenosis, and R breast mass for which she declined biopsy (given that she would decline surgery, chemo etc if found to be cancer), presented here for further evaluation of R wrist weakness, no other focal complaints. R wrist weakness did start suddenly, while awake. She did on ROS also note chronic, progressively worsening generalized weakness over a period of months. No other complaints. In the ED, CT head was negative for hemorrhage, negative for acute territorial infarction. She was placed on tele and admitted to Medicine for further management.     R wrist weakness  Symptoms started suddenly while she was awake. DDx included stroke affecting right hand cortical representation, metastatic lesion, radial nerve neuropathy, brachial plexopathy. Appreciate input from Neurology. MRI brain did not show any acute stroke or metastatic disease. MR brachial plexus also without finding to explain her R wrist/hand weakness. A1c WNL. TSH WNL. 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 Ortho and OT evals appreciated. Patient's R wrist weakness has been gradually improving.  - Continue restorative PT/OT sessions  - Continue cock-up wrist splint  - NWB to R hand  - Outpatient follow up with Dr. Alicea 1 week post-discharge    C6 adjacent fibrous tissue  -not c/f abscess on f/u imaging  -appreciate NSG, ID and IR input  -repeat CT cspine with contrast in 3-6mo    Elevated troponin  Likely myocardial demand ischemia, unable to rule out infarction. TTE with moderately reduced left ventricular systolic function with inferior wall akinesis. Estimated left ventricular ejection fraction is 40-45 %. Underwent cardiac cath today, found to have non occlusive coronary artery disease. Interventional Cardiology recommending medical management.   - Continue aspirin, statin, beta blocker    Severe aortic stenosis  As noted on TTE and subsequent cardiac cath.   - Structural heart disease outpatient follow up with Dr. Ramirez on 11/16.     Paroxysmal atrial fibrillation  NSR, rate WNL. On Metoprolol. Not on AC due to hx of past falls, past ICH.   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     Normocytic anemia  Likely anemia of chronic disease. May have some overlap with iron deficiency. No overt bleeding. No signs of hemolysis.   - Will continue to monitor      Updated family at bedside on day of d/c.  I have spent 35 min on day of d/c coordinating care. To MIREYA

## 2022-11-04 NOTE — DISCHARGE NOTE PROVIDER - NSDCFUSCHEDAPPT_GEN_ALL_CORE_FT
Daniel Ramirez  [facility] Physician [facility]  CARDIOLOGY [address]  Scheduled Appointment: 11/16/2022

## 2022-11-04 NOTE — DISCHARGE NOTE PROVIDER - NSDCCPTREATMENT_GEN_ALL_CORE_FT
PRINCIPAL PROCEDURE  Procedure: CT neck  Findings and Treatment: Get a repeat CT cervical spine with contrast in 3-6months to monitor the fibrous tissue seen adjacent to C6.

## 2022-11-04 NOTE — PROGRESS NOTE ADULT - PROVIDER SPECIALTY LIST ADULT
Hospitalist
Infectious Disease
Orthopedics
Hospitalist
Hospitalist
Infectious Disease
Neurology
Hospitalist
Intervent Cardiology
Neurology
Orthopedics
Hospitalist
Infectious Disease
Neurology
Orthopedics
Cardiology
Cardiology
Orthopedics
Cardiology

## 2022-11-04 NOTE — DISCHARGE NOTE PROVIDER - DETAILS OF MALNUTRITION DIAGNOSIS/DIAGNOSES
This patient has been assessed with a concern for Malnutrition and was treated during this hospitalization for the following Nutrition diagnosis/diagnoses:     -  10/31/2022: Severe protein-calorie malnutrition

## 2022-11-04 NOTE — DISCHARGE NOTE PROVIDER - NSDCMRMEDTOKEN_GEN_ALL_CORE_FT
acetaminophen 500 mg oral tablet: 2 tab(s) orally every 8 hours, As Needed  Aspirin Enteric Coated 81 mg oral delayed release tablet: 2 tab(s) orally once a day  betamethasone dipropionate 0.05% topical ointment: Apply topically to affected area 2 times a day, As Needed  Caltrate 600 + D oral tablet: 1 tab(s) orally 2 times a day  ICaps AREDS oral capsule: 2 cap(s) orally once a day  levothyroxine 100 mcg (0.1 mg) oral tablet: 1 tab(s) orally once a day  metoprolol succinate 25 mg oral tablet, extended release: 1 tab(s) orally once a day  Multiple Vitamins oral tablet: 1 tab(s) orally once a day  ocular lubricant ophthalmic solution: 1 drop(s) to each affected eye 2 times a day, As needed, Dry Eyes  pravastatin 40 mg oral tablet: 1 tab(s) orally once a day  sucralfate 1 g oral tablet: 1 tab(s) orally 2 times a day

## 2022-11-11 DIAGNOSIS — D50.9 IRON DEFICIENCY ANEMIA, UNSPECIFIED: ICD-10-CM

## 2022-11-11 DIAGNOSIS — M48.061 SPINAL STENOSIS, LUMBAR REGION WITHOUT NEUROGENIC CLAUDICATION: ICD-10-CM

## 2022-11-11 DIAGNOSIS — R29.6 REPEATED FALLS: ICD-10-CM

## 2022-11-11 DIAGNOSIS — I48.0 PAROXYSMAL ATRIAL FIBRILLATION: ICD-10-CM

## 2022-11-11 DIAGNOSIS — E78.5 HYPERLIPIDEMIA, UNSPECIFIED: ICD-10-CM

## 2022-11-11 DIAGNOSIS — Z86.16 PERSONAL HISTORY OF COVID-19: ICD-10-CM

## 2022-11-11 DIAGNOSIS — I10 ESSENTIAL (PRIMARY) HYPERTENSION: ICD-10-CM

## 2022-11-11 DIAGNOSIS — E03.9 HYPOTHYROIDISM, UNSPECIFIED: ICD-10-CM

## 2022-11-11 DIAGNOSIS — I24.8 OTHER FORMS OF ACUTE ISCHEMIC HEART DISEASE: ICD-10-CM

## 2022-11-11 DIAGNOSIS — D63.8 ANEMIA IN OTHER CHRONIC DISEASES CLASSIFIED ELSEWHERE: ICD-10-CM

## 2022-11-11 DIAGNOSIS — I25.10 ATHEROSCLEROTIC HEART DISEASE OF NATIVE CORONARY ARTERY WITHOUT ANGINA PECTORIS: ICD-10-CM

## 2022-11-11 DIAGNOSIS — Z79.82 LONG TERM (CURRENT) USE OF ASPIRIN: ICD-10-CM

## 2022-11-11 DIAGNOSIS — N63.10 UNSPECIFIED LUMP IN THE RIGHT BREAST, UNSPECIFIED QUADRANT: ICD-10-CM

## 2022-11-11 DIAGNOSIS — Z86.73 PERSONAL HISTORY OF TRANSIENT ISCHEMIC ATTACK (TIA), AND CEREBRAL INFARCTION WITHOUT RESIDUAL DEFICITS: ICD-10-CM

## 2022-11-11 DIAGNOSIS — Z91.81 HISTORY OF FALLING: ICD-10-CM

## 2022-11-11 DIAGNOSIS — M71.21 SYNOVIAL CYST OF POPLITEAL SPACE [BAKER], RIGHT KNEE: ICD-10-CM

## 2022-11-11 DIAGNOSIS — I35.0 NONRHEUMATIC AORTIC (VALVE) STENOSIS: ICD-10-CM

## 2022-11-11 DIAGNOSIS — R26.81 UNSTEADINESS ON FEET: ICD-10-CM

## 2022-11-11 DIAGNOSIS — G56.31 LESION OF RADIAL NERVE, RIGHT UPPER LIMB: ICD-10-CM

## 2022-11-11 DIAGNOSIS — E43 UNSPECIFIED SEVERE PROTEIN-CALORIE MALNUTRITION: ICD-10-CM

## 2022-11-11 DIAGNOSIS — M06.9 RHEUMATOID ARTHRITIS, UNSPECIFIED: ICD-10-CM

## 2022-11-11 DIAGNOSIS — M50.30 OTHER CERVICAL DISC DEGENERATION, UNSPECIFIED CERVICAL REGION: ICD-10-CM

## 2022-11-14 ENCOUNTER — NON-APPOINTMENT (OUTPATIENT)
Age: 87
End: 2022-11-14

## 2022-11-14 DIAGNOSIS — R53.1 WEAKNESS: ICD-10-CM

## 2022-11-14 DIAGNOSIS — Z87.898 PERSONAL HISTORY OF OTHER SPECIFIED CONDITIONS: ICD-10-CM

## 2022-11-14 DIAGNOSIS — E03.9 HYPOTHYROIDISM, UNSPECIFIED: ICD-10-CM

## 2022-11-14 DIAGNOSIS — I35.0 NONRHEUMATIC AORTIC (VALVE) STENOSIS: ICD-10-CM

## 2022-11-14 DIAGNOSIS — Z86.73 PERSONAL HISTORY OF TRANSIENT ISCHEMIC ATTACK (TIA), AND CEREBRAL INFARCTION W/OUT RESIDUAL DEFICITS: ICD-10-CM

## 2022-11-14 DIAGNOSIS — I48.0 PAROXYSMAL ATRIAL FIBRILLATION: ICD-10-CM

## 2022-11-14 DIAGNOSIS — I25.10 ATHEROSCLEROTIC HEART DISEASE OF NATIVE CORONARY ARTERY W/OUT ANGINA PECTORIS: ICD-10-CM

## 2022-11-14 DIAGNOSIS — M25.331 OTHER INSTABILITY, RIGHT WRIST: ICD-10-CM

## 2022-11-14 DIAGNOSIS — Z91.81 HISTORY OF FALLING: ICD-10-CM

## 2022-11-14 DIAGNOSIS — Z86.79 PERSONAL HISTORY OF OTHER DISEASES OF THE CIRCULATORY SYSTEM: ICD-10-CM

## 2022-11-14 DIAGNOSIS — M51.37 OTHER INTERVERTEBRAL DISC DEGENERATION, LUMBOSACRAL REGION: ICD-10-CM

## 2022-11-14 RX ORDER — METOPROLOL SUCCINATE 25 MG/1
25 TABLET, EXTENDED RELEASE ORAL
Qty: 90 | Refills: 1 | Status: ACTIVE | COMMUNITY
Start: 2022-11-14

## 2022-11-14 RX ORDER — ACETAMINOPHEN 500 MG/1
500 TABLET ORAL
Refills: 0 | Status: ACTIVE | COMMUNITY
Start: 2022-11-14

## 2022-11-14 RX ORDER — ASPIRIN 81 MG/1
81 TABLET ORAL
Refills: 0 | Status: ACTIVE | COMMUNITY
Start: 2022-11-14

## 2022-11-14 RX ORDER — LEVOTHYROXINE SODIUM 0.1 MG/1
100 TABLET ORAL DAILY
Refills: 0 | Status: ACTIVE | COMMUNITY
Start: 2022-11-14

## 2022-11-14 RX ORDER — BETAMETHASONE DIPROPIONATE 0.5 MG/G
0.05 CREAM TOPICAL
Refills: 0 | Status: ACTIVE | COMMUNITY
Start: 2022-11-14

## 2022-11-14 RX ORDER — PRAVASTATIN SODIUM 40 MG/1
40 TABLET ORAL DAILY
Refills: 0 | Status: ACTIVE | COMMUNITY
Start: 2022-11-14

## 2022-11-14 RX ORDER — VIT C/E/ZN/COPPR/LUTEIN/ZEAXAN 250MG-90MG
CAPSULE ORAL
Refills: 0 | Status: ACTIVE | COMMUNITY
Start: 2022-11-14

## 2022-11-14 RX ORDER — SUCRALFATE 1 G/1
1 TABLET ORAL
Refills: 0 | Status: ACTIVE | COMMUNITY
Start: 2022-11-14

## 2022-11-16 ENCOUNTER — APPOINTMENT (OUTPATIENT)
Dept: CARDIOLOGY | Facility: CLINIC | Age: 87
End: 2022-11-16
Payer: MEDICARE

## 2022-11-16 ENCOUNTER — NON-APPOINTMENT (OUTPATIENT)
Age: 87
End: 2022-11-16

## 2022-11-16 VITALS
BODY MASS INDEX: 26.24 KG/M2 | OXYGEN SATURATION: 95 % | HEIGHT: 58 IN | HEART RATE: 75 BPM | SYSTOLIC BLOOD PRESSURE: 104 MMHG | WEIGHT: 125 LBS | DIASTOLIC BLOOD PRESSURE: 67 MMHG

## 2022-11-16 PROCEDURE — 93000 ELECTROCARDIOGRAM COMPLETE: CPT

## 2022-11-16 PROCEDURE — 93010 ELECTROCARDIOGRAM REPORT: CPT

## 2022-11-16 PROCEDURE — 99215 OFFICE O/P EST HI 40 MIN: CPT

## 2022-11-16 RX ORDER — MULTIVITAMIN
TABLET ORAL DAILY
Refills: 0 | Status: DISCONTINUED | COMMUNITY
Start: 2022-11-14 | End: 2022-11-16

## 2022-11-25 NOTE — HISTORY OF PRESENT ILLNESS
[FreeTextEntry1] : 89 year-old woman with hx of aortic stenosis (low gradient on TTE, cath), mitral valve repair, paroxysmal atrial fibrillation, hx of TIA, chronic unsteady gait, hx of fall, hx of intracranial hemorrhage, no longer on AC, lumbar degenerative changes, spinal stenosis, and R breast mass who presents today for follow up of aortic stenosis. \par \par She was hospitalized for evaluation of R wrist weakness, no other focal complaints. R wrist weakness did start suddenly, while awake. She did on ROS also note chronic, progressively worsening generalized weakness over a period of months. No other complaints. In the ED, CT head was negative for hemorrhage, negative for acute territorial infarction.\par \par She was admitted and underwent left heart cath as part of severe aortic stenosis work up. Low gradients on cath and echo in setting of low EF. She denies any shortness of breath, syncope, chest pain. \par \sunitha Lives in [place], [place], and is looking for following up locally. She is reluctant to travel either to [facility] or [place]. \par

## 2022-11-25 NOTE — ED PROVIDER NOTE - RATE
1)  To call Physician for any signs of wound infection: Fever greater than 101 degrees F, bleeding, separation of incision, pus like drainage, or excessive swelling      2)  To call Physician for difficulty breathing, vomiting, inability to tolerate oral intake      3)  To call Physician for any pain that is not controlled by pain medication or anything worrisome      4)  To avoid driving while taking pain medications or experiencing pain      5)  To contact physician's office for post-operative appointment    1)  Call Physician for any signs of wound infection: Fever greater than 101 degrees F, bleeding, separation of incision, pus like drainage, or excessive swelling      2)  Call Physician for difficulty breathing, vomiting, inability to tolerate oral intake      3)  Call Physician for any pain that is not controlled by pain medication or anything worrisome      4)  Avoid driving while taking pain medications or experiencing pain      5)  Contact physician's office for post-operative appointment  6) If taking an oral birth control, may need to use a back up method for the next 7-10 days.      Included in your discharge:  When to call your doctor  Nausea/Vomiting  Constipation  Crutches  Exercises  
82
good balance

## 2022-11-25 NOTE — DISCUSSION/SUMMARY
[FreeTextEntry1] : 89 year-old woman with hx of aortic stenosis (low gradient on TTE, cath), mitral valve repair, paroxysmal atrial fibrillation, hx of TIA, chronic unsteady gait, hx of fall, hx of intracranial hemorrhage, no longer on AC, lumbar degenerative changes, spinal stenosis, and R breast mass who presents today for follow up of aortic stenosis. Her EF is 40-45% with quite low gradients on cath as well as TTE, thus I suspect potential LVOT measurement error. Either way, she completely asymptomatic and reluctant to any further work up at this time. Plan for patient to have TTE and CT TAVR protocol in [place], [place] likely in 6 months. Will follow up with patient with teleheatlh visit after resulting testing to evaluate any further invasive management. \par \par Differential diagnosis and plan of care discussed with patient after the evaluation. \par Counseling on diet, exercise, and medication compliance was done.\par Counseling on smoking and alcohol cessation was offered when appropriate.\par Pain assessed and judicious use of narcotics when appropriate was discussed.\par Importance of fall prevention discussed.\par Advanced care planning was discussed with patient and family.\par

## 2022-11-25 NOTE — CARDIOLOGY SUMMARY
[de-identified] : TTE Echo Complete w/o Contrast w/ Doppler (11.01.22 @ 08:21) >\par  Significant fibrocalcific changes noted to the aortic valve leaflets with restriction in leaflet excursion. Calculated LAURA is .85cm2 ; this finding is consistent with severe aortic stenosis.\par  The left atrium is moderately dilated.\par  Mild concentric left ventricular hypertrophy is present. Moderately reduced left ventricular systolic function with inferior wall akinesis.   Estimated left ventricular ejection fraction is 40-45 %.\par  Moderate (2+) mitral regurgitation is present.\par   Mild (1+) tricuspid valve regurgitation is present.\par Peak 29, mean 18 [de-identified] : Cardiac Cath Diagnostic Conclusions:\par  Non-obstructive coronary artery disease. EF 45% with LVEDP 9. LV-Ao gradient 5.\par  Recommendations:   Recommend aggressive medical management for non-obstructive CAD. Patient to follow up in structural heart office at [facility] regarding further investigation of low gradient aortic stenosis.

## 2023-01-01 ENCOUNTER — EMERGENCY (EMERGENCY)
Facility: HOSPITAL | Age: 88
LOS: 0 days | Discharge: HOME | End: 2023-01-01
Attending: EMERGENCY MEDICINE
Payer: MEDICARE

## 2023-01-01 VITALS
SYSTOLIC BLOOD PRESSURE: 160 MMHG | OXYGEN SATURATION: 100 % | TEMPERATURE: 98 F | DIASTOLIC BLOOD PRESSURE: 79 MMHG | WEIGHT: 110.01 LBS | HEART RATE: 79 BPM | RESPIRATION RATE: 18 BRPM

## 2023-01-01 VITALS
OXYGEN SATURATION: 100 % | HEART RATE: 76 BPM | TEMPERATURE: 98 F | RESPIRATION RATE: 18 BRPM | DIASTOLIC BLOOD PRESSURE: 77 MMHG | SYSTOLIC BLOOD PRESSURE: 150 MMHG

## 2023-01-01 DIAGNOSIS — I48.0 PAROXYSMAL ATRIAL FIBRILLATION: ICD-10-CM
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
## 2023-01-01 DIAGNOSIS — Z79.82 LONG TERM (CURRENT) USE OF ASPIRIN: ICD-10-CM

## 2023-01-01 DIAGNOSIS — S00.93XA CONTUSION OF UNSPECIFIED PART OF HEAD, INITIAL ENCOUNTER: ICD-10-CM
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
## 2023-01-01 DIAGNOSIS — R26.81 UNSTEADINESS ON FEET: ICD-10-CM

## 2023-01-01 DIAGNOSIS — Z91.018 ALLERGY TO OTHER FOODS: ICD-10-CM

## 2023-01-01 DIAGNOSIS — Z95.2 PRESENCE OF PROSTHETIC HEART VALVE: ICD-10-CM

## 2023-01-01 DIAGNOSIS — Z88.6 ALLERGY STATUS TO ANALGESIC AGENT: ICD-10-CM

## 2023-01-01 DIAGNOSIS — Z88.5 ALLERGY STATUS TO NARCOTIC AGENT: ICD-10-CM

## 2023-01-01 DIAGNOSIS — Z88.1 ALLERGY STATUS TO OTHER ANTIBIOTIC AGENTS STATUS: ICD-10-CM

## 2023-01-01 DIAGNOSIS — Z86.73 PERSONAL HISTORY OF TRANSIENT ISCHEMIC ATTACK (TIA), AND CEREBRAL INFARCTION WITHOUT RESIDUAL DEFICITS: ICD-10-CM

## 2023-01-01 PROCEDURE — 99284 EMERGENCY DEPT VISIT MOD MDM: CPT | Mod: 25

## 2023-01-01 PROCEDURE — 70450 CT HEAD/BRAIN W/O DYE: CPT | Mod: MA

## 2023-01-01 PROCEDURE — 72125 CT NECK SPINE W/O DYE: CPT | Mod: 26,MA

## 2023-01-01 PROCEDURE — 70450 CT HEAD/BRAIN W/O DYE: CPT | Mod: 26,MA

## 2023-01-01 PROCEDURE — 72125 CT NECK SPINE W/O DYE: CPT | Mod: MA

## 2023-01-01 PROCEDURE — 99284 EMERGENCY DEPT VISIT MOD MDM: CPT

## 2023-01-01 NOTE — ED ADULT TRIAGE NOTE - CHIEF COMPLAINT QUOTE
Fell forward out of chair. Hit head. No LOC. On ASA. No obvious injuries. MD Kemp made aware. Neuro alert called.

## 2023-01-01 NOTE — ED PROVIDER NOTE - PATIENT PORTAL LINK FT
You can access the FollowMyHealth Patient Portal offered by [facility] by registering at the following website: [email or web address]. By joining [employer]’s FollowMyHealth portal, you will also be able to view your health information using other applications (apps) compatible with our system.

## 2023-01-01 NOTE — ED ADULT NURSE NOTE - NSIMPLEMENTINTERV_GEN_ALL_ED
Implemented All Fall with Harm Risk Interventions:  [place] to call system. Call bell, personal items and telephone within reach. Instruct patient to call for assistance. Room bathroom lighting operational. Non-slip footwear when patient is off stretcher. Physically safe environment: no spills, clutter or unnecessary equipment. Stretcher in lowest position, wheels locked, appropriate side rails in place. Provide visual cue, wrist band, yellow gown, etc. Monitor gait and stability. Monitor for mental status changes and reorient to person, place, and time. Review medications for side effects contributing to fall risk. Reinforce activity limits and safety measures with patient and family. Provide visual clues: red socks.

## 2023-01-01 NOTE — ED PROVIDER NOTE - CLINICAL SUMMARY MEDICAL DECISION MAKING FREE TEXT BOX
Neuro alert called to triage: CT head to rule out intracranial hemorrhage, CT cervical spine to rule out fracture

## 2023-01-01 NOTE — ED ADULT TRIAGE NOTE - NS ED NURSE BANDS TYPE
Called pt back in regards to this message.    Attempted to call pt previously in regards to echo results    No answer again  Left detailed voice message as well as call back number    ND   Name band;

## 2023-01-01 NOTE — ED PROVIDER NOTE - OBJECTIVE STATEMENT
89 year-old woman with hx of mitral valve repair, paroxysmal atrial fibrillation, hx of TIA, chronic unsteady gait, hx of fall, hx of intracranial hemorrhage, no longer on ACBrought in by ambulance for evaluation of head injury status post fall.  The patient states that she fell asleep in a recliner watching TV and when she woke up she did not realize that she was in the recliner.  Patient that she was in her bed and she sat up to walk to the bathroom but the recliner was not in the close position causing her to lose her balance and fall forward.  Patient does not remember if she hit her head but then noticed swelling in the right occipital region of her head.  Patient denies any other complaints at this time.

## 2023-01-01 NOTE — ED ADULT NURSE NOTE - OBJECTIVE STATEMENT
Fell forward out of chair. Hit head. No LOC. On ASA. No obvious injuries. MD Kemp made aware. Neuro alert called. Pt denies any new pain.

## 2023-01-12 ENCOUNTER — APPOINTMENT (OUTPATIENT)
Dept: ORTHOPEDIC SURGERY | Facility: CLINIC | Age: 88
End: 2023-01-12

## 2023-01-13 ENCOUNTER — APPOINTMENT (OUTPATIENT)
Dept: ORTHOPEDIC SURGERY | Facility: CLINIC | Age: 88
End: 2023-01-13

## 2023-02-09 ENCOUNTER — APPOINTMENT (OUTPATIENT)
Dept: ORTHOPEDIC SURGERY | Facility: CLINIC | Age: 88
End: 2023-02-09

## 2023-03-15 ENCOUNTER — INPATIENT (INPATIENT)
Facility: HOSPITAL | Age: 88
LOS: 0 days | Discharge: NURSING, REHAB OR LONG-TERM CARE | DRG: 314 | End: 2023-03-16
Attending: INTERNAL MEDICINE | Admitting: STUDENT IN AN ORGANIZED HEALTH CARE EDUCATION/TRAINING PROGRAM
Payer: MEDICARE

## 2023-03-15 VITALS
DIASTOLIC BLOOD PRESSURE: 60 MMHG | OXYGEN SATURATION: 96 % | HEART RATE: 79 BPM | SYSTOLIC BLOOD PRESSURE: 154 MMHG | RESPIRATION RATE: 18 BRPM | HEIGHT: 59 IN | WEIGHT: 125 LBS | TEMPERATURE: 98 F

## 2023-03-15 DIAGNOSIS — I35.0 NONRHEUMATIC AORTIC (VALVE) STENOSIS: ICD-10-CM

## 2023-03-15 DIAGNOSIS — R77.8 OTHER SPECIFIED ABNORMALITIES OF PLASMA PROTEINS: ICD-10-CM

## 2023-03-15 DIAGNOSIS — I48.0 PAROXYSMAL ATRIAL FIBRILLATION: ICD-10-CM

## 2023-03-15 DIAGNOSIS — Z95.1 PRESENCE OF AORTOCORONARY BYPASS GRAFT: Chronic | ICD-10-CM

## 2023-03-15 LAB
ALBUMIN SERPL ELPH-MCNC: 3.2 G/DL — LOW (ref 3.3–5)
ALP SERPL-CCNC: 55 U/L — SIGNIFICANT CHANGE UP (ref 40–120)
ALT FLD-CCNC: 21 U/L — SIGNIFICANT CHANGE UP (ref 12–78)
ANION GAP SERPL CALC-SCNC: 5 MMOL/L — SIGNIFICANT CHANGE UP (ref 5–17)
APPEARANCE UR: CLEAR — SIGNIFICANT CHANGE UP
AST SERPL-CCNC: 18 U/L — SIGNIFICANT CHANGE UP (ref 15–37)
BASOPHILS # BLD AUTO: 0.04 K/UL — SIGNIFICANT CHANGE UP (ref 0–0.2)
BASOPHILS NFR BLD AUTO: 0.6 % — SIGNIFICANT CHANGE UP (ref 0–2)
BILIRUB SERPL-MCNC: 0.4 MG/DL — SIGNIFICANT CHANGE UP (ref 0.2–1.2)
BILIRUB UR-MCNC: NEGATIVE — SIGNIFICANT CHANGE UP
BUN SERPL-MCNC: 25 MG/DL — HIGH (ref 7–23)
CALCIUM SERPL-MCNC: 9.3 MG/DL — SIGNIFICANT CHANGE UP (ref 8.5–10.1)
CHLORIDE SERPL-SCNC: 105 MMOL/L — SIGNIFICANT CHANGE UP (ref 96–108)
CO2 SERPL-SCNC: 26 MMOL/L — SIGNIFICANT CHANGE UP (ref 22–31)
COLOR SPEC: YELLOW — SIGNIFICANT CHANGE UP
CREAT SERPL-MCNC: 0.72 MG/DL — SIGNIFICANT CHANGE UP (ref 0.5–1.3)
DIFF PNL FLD: NEGATIVE — SIGNIFICANT CHANGE UP
EGFR: 80 ML/MIN/1.73M2 — SIGNIFICANT CHANGE UP
EOSINOPHIL # BLD AUTO: 0.35 K/UL — SIGNIFICANT CHANGE UP (ref 0–0.5)
EOSINOPHIL NFR BLD AUTO: 5.1 % — SIGNIFICANT CHANGE UP (ref 0–6)
FLUAV AG NPH QL: SIGNIFICANT CHANGE UP
FLUBV AG NPH QL: SIGNIFICANT CHANGE UP
GLUCOSE SERPL-MCNC: 104 MG/DL — HIGH (ref 70–99)
GLUCOSE UR QL: NEGATIVE — SIGNIFICANT CHANGE UP
HCT VFR BLD CALC: 32.8 % — LOW (ref 34.5–45)
HGB BLD-MCNC: 10.6 G/DL — LOW (ref 11.5–15.5)
IMM GRANULOCYTES NFR BLD AUTO: 0.1 % — SIGNIFICANT CHANGE UP (ref 0–0.9)
KETONES UR-MCNC: NEGATIVE — SIGNIFICANT CHANGE UP
LEUKOCYTE ESTERASE UR-ACNC: NEGATIVE — SIGNIFICANT CHANGE UP
LYMPHOCYTES # BLD AUTO: 1.29 K/UL — SIGNIFICANT CHANGE UP (ref 1–3.3)
LYMPHOCYTES # BLD AUTO: 18.8 % — SIGNIFICANT CHANGE UP (ref 13–44)
MCHC RBC-ENTMCNC: 29.4 PG — SIGNIFICANT CHANGE UP (ref 27–34)
MCHC RBC-ENTMCNC: 32.3 GM/DL — SIGNIFICANT CHANGE UP (ref 32–36)
MCV RBC AUTO: 90.9 FL — SIGNIFICANT CHANGE UP (ref 80–100)
MONOCYTES # BLD AUTO: 0.53 K/UL — SIGNIFICANT CHANGE UP (ref 0–0.9)
MONOCYTES NFR BLD AUTO: 7.7 % — SIGNIFICANT CHANGE UP (ref 2–14)
NEUTROPHILS # BLD AUTO: 4.66 K/UL — SIGNIFICANT CHANGE UP (ref 1.8–7.4)
NEUTROPHILS NFR BLD AUTO: 67.7 % — SIGNIFICANT CHANGE UP (ref 43–77)
NITRITE UR-MCNC: NEGATIVE — SIGNIFICANT CHANGE UP
PH UR: 7 — SIGNIFICANT CHANGE UP (ref 5–8)
PLATELET # BLD AUTO: 251 K/UL — SIGNIFICANT CHANGE UP (ref 150–400)
POTASSIUM SERPL-MCNC: 3.9 MMOL/L — SIGNIFICANT CHANGE UP (ref 3.5–5.3)
POTASSIUM SERPL-SCNC: 3.9 MMOL/L — SIGNIFICANT CHANGE UP (ref 3.5–5.3)
PROT SERPL-MCNC: 7.1 GM/DL — SIGNIFICANT CHANGE UP (ref 6–8.3)
PROT UR-MCNC: NEGATIVE — SIGNIFICANT CHANGE UP
RBC # BLD: 3.61 M/UL — LOW (ref 3.8–5.2)
RBC # FLD: 12.7 % — SIGNIFICANT CHANGE UP (ref 10.3–14.5)
RSV RNA NPH QL NAA+NON-PROBE: SIGNIFICANT CHANGE UP
SARS-COV-2 RNA SPEC QL NAA+PROBE: SIGNIFICANT CHANGE UP
SODIUM SERPL-SCNC: 136 MMOL/L — SIGNIFICANT CHANGE UP (ref 135–145)
SP GR SPEC: 1 — LOW (ref 1.01–1.02)
TROPONIN I, HIGH SENSITIVITY RESULT: 354.1 NG/L — HIGH
TROPONIN I, HIGH SENSITIVITY RESULT: 360.4 NG/L — HIGH
UROBILINOGEN FLD QL: NEGATIVE — SIGNIFICANT CHANGE UP
WBC # BLD: 6.88 K/UL — SIGNIFICANT CHANGE UP (ref 3.8–10.5)
WBC # FLD AUTO: 6.88 K/UL — SIGNIFICANT CHANGE UP (ref 3.8–10.5)

## 2023-03-15 PROCEDURE — 71045 X-RAY EXAM CHEST 1 VIEW: CPT

## 2023-03-15 PROCEDURE — 97116 GAIT TRAINING THERAPY: CPT | Mod: GP

## 2023-03-15 PROCEDURE — 99285 EMERGENCY DEPT VISIT HI MDM: CPT

## 2023-03-15 PROCEDURE — 83036 HEMOGLOBIN GLYCOSYLATED A1C: CPT

## 2023-03-15 PROCEDURE — 73700 CT LOWER EXTREMITY W/O DYE: CPT | Mod: 26,RT,MG

## 2023-03-15 PROCEDURE — 36415 COLL VENOUS BLD VENIPUNCTURE: CPT

## 2023-03-15 PROCEDURE — 93010 ELECTROCARDIOGRAM REPORT: CPT

## 2023-03-15 PROCEDURE — 76376 3D RENDER W/INTRP POSTPROCES: CPT | Mod: 26

## 2023-03-15 PROCEDURE — G1004: CPT

## 2023-03-15 PROCEDURE — 99222 1ST HOSP IP/OBS MODERATE 55: CPT

## 2023-03-15 PROCEDURE — 97162 PT EVAL MOD COMPLEX 30 MIN: CPT | Mod: GP

## 2023-03-15 PROCEDURE — 71045 X-RAY EXAM CHEST 1 VIEW: CPT | Mod: 26

## 2023-03-15 PROCEDURE — 70450 CT HEAD/BRAIN W/O DYE: CPT | Mod: 26,MG

## 2023-03-15 PROCEDURE — 99223 1ST HOSP IP/OBS HIGH 75: CPT

## 2023-03-15 PROCEDURE — 84484 ASSAY OF TROPONIN QUANT: CPT

## 2023-03-15 PROCEDURE — 97530 THERAPEUTIC ACTIVITIES: CPT | Mod: GP

## 2023-03-15 PROCEDURE — 85027 COMPLETE CBC AUTOMATED: CPT

## 2023-03-15 PROCEDURE — 80048 BASIC METABOLIC PNL TOTAL CA: CPT

## 2023-03-15 PROCEDURE — 80061 LIPID PANEL: CPT

## 2023-03-15 RX ORDER — METOPROLOL TARTRATE 50 MG
25 TABLET ORAL DAILY
Refills: 0 | Status: DISCONTINUED | OUTPATIENT
Start: 2023-03-15 | End: 2023-03-16

## 2023-03-15 RX ORDER — LEVOTHYROXINE SODIUM 125 MCG
100 TABLET ORAL DAILY
Refills: 0 | Status: DISCONTINUED | OUTPATIENT
Start: 2023-03-15 | End: 2023-03-16

## 2023-03-15 RX ORDER — ATORVASTATIN CALCIUM 80 MG/1
10 TABLET, FILM COATED ORAL AT BEDTIME
Refills: 0 | Status: DISCONTINUED | OUTPATIENT
Start: 2023-03-15 | End: 2023-03-15

## 2023-03-15 RX ORDER — ONDANSETRON 8 MG/1
4 TABLET, FILM COATED ORAL ONCE
Refills: 0 | Status: COMPLETED | OUTPATIENT
Start: 2023-03-15 | End: 2023-03-15

## 2023-03-15 RX ORDER — ENOXAPARIN SODIUM 100 MG/ML
40 INJECTION SUBCUTANEOUS EVERY 24 HOURS
Refills: 0 | Status: DISCONTINUED | OUTPATIENT
Start: 2023-03-15 | End: 2023-03-16

## 2023-03-15 RX ORDER — ASPIRIN/CALCIUM CARB/MAGNESIUM 324 MG
81 TABLET ORAL DAILY
Refills: 0 | Status: DISCONTINUED | OUTPATIENT
Start: 2023-03-15 | End: 2023-03-16

## 2023-03-15 RX ORDER — ACETAMINOPHEN 500 MG
1000 TABLET ORAL ONCE
Refills: 0 | Status: COMPLETED | OUTPATIENT
Start: 2023-03-15 | End: 2023-03-15

## 2023-03-15 RX ORDER — LIDOCAINE 4 G/100G
1 CREAM TOPICAL DAILY
Refills: 0 | Status: DISCONTINUED | OUTPATIENT
Start: 2023-03-15 | End: 2023-03-16

## 2023-03-15 RX ORDER — ATORVASTATIN CALCIUM 80 MG/1
40 TABLET, FILM COATED ORAL AT BEDTIME
Refills: 0 | Status: DISCONTINUED | OUTPATIENT
Start: 2023-03-15 | End: 2023-03-16

## 2023-03-15 RX ORDER — SUCRALFATE 1 G
1 TABLET ORAL
Refills: 0 | Status: DISCONTINUED | OUTPATIENT
Start: 2023-03-15 | End: 2023-03-16

## 2023-03-15 RX ORDER — LANOLIN ALCOHOL/MO/W.PET/CERES
3 CREAM (GRAM) TOPICAL AT BEDTIME
Refills: 0 | Status: DISCONTINUED | OUTPATIENT
Start: 2023-03-15 | End: 2023-03-16

## 2023-03-15 RX ORDER — ONDANSETRON 8 MG/1
4 TABLET, FILM COATED ORAL EVERY 8 HOURS
Refills: 0 | Status: DISCONTINUED | OUTPATIENT
Start: 2023-03-15 | End: 2023-03-16

## 2023-03-15 RX ORDER — ACETAMINOPHEN 500 MG
650 TABLET ORAL EVERY 6 HOURS
Refills: 0 | Status: DISCONTINUED | OUTPATIENT
Start: 2023-03-15 | End: 2023-03-16

## 2023-03-15 RX ORDER — SODIUM CHLORIDE 9 MG/ML
1000 INJECTION INTRAMUSCULAR; INTRAVENOUS; SUBCUTANEOUS
Refills: 0 | Status: DISCONTINUED | OUTPATIENT
Start: 2023-03-15 | End: 2023-03-16

## 2023-03-15 RX ORDER — ACETAMINOPHEN 500 MG
2 TABLET ORAL
Qty: 0 | Refills: 0 | DISCHARGE

## 2023-03-15 RX ADMIN — LIDOCAINE 1 PATCH: 4 CREAM TOPICAL at 17:00

## 2023-03-15 RX ADMIN — Medication 400 MILLIGRAM(S): at 06:38

## 2023-03-15 RX ADMIN — Medication 1000 MILLIGRAM(S): at 06:58

## 2023-03-15 RX ADMIN — Medication 25 MILLIGRAM(S): at 16:57

## 2023-03-15 RX ADMIN — Medication 1000 MILLIGRAM(S): at 06:53

## 2023-03-15 RX ADMIN — ONDANSETRON 4 MILLIGRAM(S): 8 TABLET, FILM COATED ORAL at 06:27

## 2023-03-15 RX ADMIN — SODIUM CHLORIDE 50 MILLILITER(S): 9 INJECTION INTRAMUSCULAR; INTRAVENOUS; SUBCUTANEOUS at 11:58

## 2023-03-15 RX ADMIN — Medication 81 MILLIGRAM(S): at 21:15

## 2023-03-15 RX ADMIN — ATORVASTATIN CALCIUM 40 MILLIGRAM(S): 80 TABLET, FILM COATED ORAL at 21:15

## 2023-03-15 RX ADMIN — LIDOCAINE 1 PATCH: 4 CREAM TOPICAL at 20:00

## 2023-03-15 RX ADMIN — ENOXAPARIN SODIUM 40 MILLIGRAM(S): 100 INJECTION SUBCUTANEOUS at 16:57

## 2023-03-15 NOTE — ED PROVIDER NOTE - CLINICAL SUMMARY MEDICAL DECISION MAKING FREE TEXT BOX
78 y/o female with h/o HTN in ED c/o n/v/d/abd pain since 2AM this morning.   spouse states they both had the same turket sandwich for dinner last night and he has no issues.   states pt attempted to take Pepto Bismo but was unable to keep down.   states not tolerating any PO.   pt denies any fever, HA, cp, sob,.   states h/o abd surgery but unable to give what type of surgery.   pt denies any urinary symptoms.   no sick contacts    PE:   well appearing with no distress.   visible trauma to right knee.   audible heart murmur.   pt with episode of dizziness and nausea with h/o CAD and CABG concerning for cardiac issues.   will check CTs, EKG, CXR, labs, UA, meds and then admit for further cardiac w/u. 78 y/o female with h/o HTN in ED c/o n/v/d/abd pain since 2AM this morning.   spouse states they both had the same turket sandwich for dinner last night and he has no issues.   states pt attempted to take Pepto Bismo but was unable to keep down.   states not tolerating any PO.   pt denies any fever, HA, cp, sob,.   states h/o abd surgery but unable to give what type of surgery.   pt denies any urinary symptoms.   no sick contacts    PE:   well appearing with no distress.   visible trauma to right knee.   audible heart murmur.   pt with episode of dizziness and nausea with h/o CAD and CABG concerning for cardiac issues.   will check CTs, EKG, CXR, labs, UA, meds and then admit for further cardiac w/u.    0630:   trop noted.   pt with no cp.   allergic to ASA.   will consult cardiology and then admit 78 y/o female with h/o HTN in ED c/o n/v/d/abd pain since 2AM this morning.   spouse states they both had the same turket sandwich for dinner last night and he has no issues.   states pt attempted to take Pepto Bismo but was unable to keep down.   states not tolerating any PO.   pt denies any fever, HA, cp, sob,.   states h/o abd surgery but unable to give what type of surgery.   pt denies any urinary symptoms.   no sick contacts    PE:   well appearing with no distress.   visible trauma to right knee.   audible heart murmur.   pt with episode of dizziness and nausea with h/o CAD and CABG concerning for cardiac issues.   will check CTs, EKG, CXR, labs, UA, meds and then admit for further cardiac w/u.    0630:   trop noted.   pt with no cp.   allergic to ASA.   will consult cardiology and then admit  0655:  case d/w Jong cards and states to not give any meds until he evaluates her shortly.   message left on hospitalist phone at [phone] about admit and card consult and will admit with LON

## 2023-03-15 NOTE — PHYSICAL THERAPY INITIAL EVALUATION ADULT - MODALITIES TREATMENT COMMENTS
Patient returned to bed by request, call bell in reach and bed alarm active. PrimaFit in use. Set up with breakfast tray. JEROMY, RN informed of session/status.

## 2023-03-15 NOTE — ED ADULT NURSE NOTE - NSIMPLEMENTINTERV_GEN_ALL_ED
Implemented All Fall Risk Interventions:  [place] to call system. Call bell, personal items and telephone within reach. Instruct patient to call for assistance. Room bathroom lighting operational. Non-slip footwear when patient is off stretcher. Physically safe environment: no spills, clutter or unnecessary equipment. Stretcher in lowest position, wheels locked, appropriate side rails in place. Provide visual cue, wrist band, yellow gown, etc. Monitor gait and stability. Monitor for mental status changes and reorient to person, place, and time. Review medications for side effects contributing to fall risk. Reinforce activity limits and safety measures with patient and family.

## 2023-03-15 NOTE — CONSULT NOTE ADULT - PROBLEM SELECTOR RECOMMENDATION 2
Low-gradient, severe AS -- patient met with Dr Ramirez in late November 2022 and she was reluctant to pursue intervention; additional imaging was planned for Spring 2023.    Mrs. Alvarado can follow-up with Dr Ramirez in outpatient setting.

## 2023-03-15 NOTE — ED PROVIDER NOTE - OBJECTIVE STATEMENT
89 y/o female with h/o CABG, CAD in ED from [facility] c/o dizziness with nausea this morning leading to fall onto right knee.   pt denies any LOC, HA, head trauma, neck pain, cp, v/d/abd pain.   pt states did have sob after fall.   pt states moved from [place] in 5/2022 and has not followed up with cardiology since.   no sick contacts.   pt states was walking to bathroom when episode occurred .  states allergic to ASA

## 2023-03-15 NOTE — PHYSICAL THERAPY INITIAL EVALUATION ADULT - ADDITIONAL COMMENTS
Patient reported had decreased endurance, must stop and rest frequently on her way to the dining mandel.

## 2023-03-15 NOTE — ED ADULT NURSE NOTE - OBJECTIVE STATEMENT
Pt  bibems from [facility] s/p unwitnessed fall in the bedroom. Pt is A&Ox4. Pt reports hx of CABG in 1999 and CAD. Pt states "I didn't hit my head or have LOC". Pt c/o right elbow and knee pain currently and also states she's nauseous and dizzy. Pt states she felt dizzy at time of fall and also felt SOB after the fall. As per ems, pt was on blood thinners for afib but got taken off of them. Pt had EKG completed and has IV access. Pt at this time denies CP, SOB and fever.

## 2023-03-15 NOTE — ED ADULT NURSE REASSESSMENT NOTE - NS ED NURSE REASSESS COMMENT FT1
Patient placed in fall risk gown, fall risk band applied to left wrist, and safety socks on. Placed placed on bedpan and then on purewick with a clean diaper.

## 2023-03-15 NOTE — H&P ADULT - NSHPLABSRESULTS_GEN_ALL_CORE
CBC:            10.6   6.88  )-----------( 251      ( 03-15-23 @ 05:46 )             32.8         Chem:         ( 03-15-23 @ 05:46 )    136  |  105  |  25<H>  ----------------------------<  104<H>  3.9   |  26  |  0.72        Liver Functions: ( 03-15-23 @ 05:46 )  Alb: 3.2 g/dL / Pro: 7.1 gm/dL / ALK PHOS: 55 U/L / ALT: 21 U/L / AST: 18 U/L / GGT: x              Type & Screen:     < from: CT 3D Reconstruct w/o Workstation (03.15.23 @ 06:15) >    IMPRESSION:  1.  No displaced fracture. Moderate edema surrounding the knee within   hematoma within the subcutaneous tissues of the anterior knee.  2.  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 Small to moderate knee joint effusion and Baker's cyst.  3.  There is 1.5 cm of anterior translation of the tibia in relation to   the femur likely from chronic anterior cruciate ligament injury.    --- End of Report ---    < end of copied text >

## 2023-03-15 NOTE — ED ADULT NURSE NOTE - CHIEF COMPLAINT QUOTE
pt bibems from [facility] s/p unwitnessed fall. pt A&ox4, and denies any LOC or head strike. Pt c/o right elbow and knee pain. Pt states she felt dizzy at time of fall. As per ems, pt was on blood thinners for afib but got taken off of them. No NA per MD Bell. Pt taken for to room for EKG. VS stable.

## 2023-03-15 NOTE — CONSULT NOTE ADULT - PROBLEM SELECTOR RECOMMENDATION 9
Elevated troponin -- found incidentally following a fall; no associated ischemic symptoms and with recent LHC demonstrating minimal CAD.  Troponin was similarly elevated during 11/2022 hospitalization (256) -- may be related to cardiomyopathy / severe aortic stenosis.  Repeat troponin.

## 2023-03-15 NOTE — H&P ADULT - ASSESSMENT
#Fall vs syncope r/o ACS  - admit to tele  - EKG: w/o clear dynamic changes  - serial troponins  - 2D echo  - cardiology following  - pt on ASA at home (unclear why listed as allergy in her charts)  - Statin  - Stat trop now  - Most recent cath 11/22 revealed low gradient AS with minimal CAD  - patient met with Dr Ramirez in late November 2022 and she was reluctant to pursue intervention; additional imaging was planned for Spring 2023.  - pain control  - PT consult    #PAF  - in NSR now  - not on A/C due to h/o ICH  - cont ASA for now    #Knee hematoma/edema  - H/H stable (anemia with BL 9)  - local pain control  - tylenol  - elevate  - ice compress  - cont home dose of ASA      DVT px: LMWH

## 2023-03-15 NOTE — PHYSICAL THERAPY INITIAL EVALUATION ADULT - PATIENT PROFILE REVIEW, REHAB EVAL
Problem: Venous Thromboembolism (VTW)/Deep Vein Thrombosis (DVT) Prevention:  Goal: Patient will participate in Venous Thrombosis (VTE)/Deep Vein Thrombosis (DVT)Prevention Measures  Outcome: PROGRESSING AS EXPECTED  Patient refusing lovenox and SCD however is independent in the room and educated to walk frequently.          PMH:  minimal CAD (cath 11/2022), severe aortic stenosis (low-gradient), mitral valve repair, paroxysmal atrial fibrillation (no chronic anticoagulation due to falls and past ICH), TIA/yes

## 2023-03-15 NOTE — H&P ADULT - NSHPPHYSICALEXAM_GEN_ALL_CORE
ICU Vital Signs Last 24 Hrs  T(C): 36.3 (15 Mar 2023 09:28), Max: 36.6 (15 Mar 2023 07:07)  T(F): 97.3 (15 Mar 2023 09:28), Max: 97.8 (15 Mar 2023 07:07)  HR: 65 (15 Mar 2023 09:28) (65 - 79)  BP: 144/63 (15 Mar 2023 09:28) (128/62 - 161/84)  BP(mean): 81 (15 Mar 2023 08:43) (81 - 105)  ABP: --  ABP(mean): --  RR: 18 (15 Mar 2023 09:28) (15 - 18)  SpO2: 100% (15 Mar 2023 09:28) (94% - 100%)    O2 Parameters below as of 15 Mar 2023 09:28  Patient On (Oxygen Delivery Method): room air            PHYSICAL EXAM:    Constitutional: NAD, awake and alert  HEENT: PERR, EOMI, Normal Hearing, MMM  Neck: Soft and supple, No LAD, No JVD  Respiratory: Breath sounds are clear bilaterally, No wheezing, rales or rhonchi  Cardiovascular: S1 and S2, regular rate and rhythm, no Murmurs, gallops or rubs  Gastrointestinal: Bowel Sounds present, soft, nontender, nondistended, no guarding, no rebound  Extremities: No peripheral edema  Vascular: 2+ peripheral pulses  Neurological: A/O x 3, no focal deficits  Musculoskeletal: 5/5 strength b/l upper and lower extremities  Skin: No rashes

## 2023-03-15 NOTE — PHARMACOTHERAPY INTERVENTION NOTE - COMMENTS
Med history complete, reviewed medications and allergies with patient med list from [facility] and confirmed medication list with doctor first med profile

## 2023-03-15 NOTE — PATIENT PROFILE ADULT - FALL HARM RISK - HARM RISK INTERVENTIONS

## 2023-03-15 NOTE — PHYSICAL THERAPY INITIAL EVALUATION ADULT - PERTINENT HX OF CURRENT PROBLEM, REHAB EVAL
[place] birth 88 yo F admitted post a fall at home. She reports feeling dizzy and losing her balance during an early AM walk to bathroom -- subsequently fell; does not think she lost consciousness and only complains of knee pain.  CT R knee: No displaced fracture. Moderate edema surrounding the knee within hematoma within the subcutaneous tissues of the anterior knee. Tricompartmental osteoarthritis which is moderate at the medial component. Small to moderate knee joint effusion and Baker's cyst.

## 2023-03-15 NOTE — CONSULT NOTE ADULT - SUBJECTIVE AND OBJECTIVE BOX
CHIEF COMPLAINT: Patient is a 89y old  Female who presents with a chief complaint of knee pain    HPI: 89 year old woman with a history of minimal CAD (cath 11/2022), severe aortic stenosis (low-gradient), mitral valve repair, paroxysmal atrial fibrillation (no chronic anticoagulation due to falls and past ICH), TIA who presented to the ER following a fall.  She reports feeling dizzy and losing her balance during an early AM walk to bathroom -- subsequently fell; does not think she lost consciousness and only complains of knee pain.  She denies chest pain, palpitations.  She describes mild dyspnea when she "bends over" but otherwise not SOB.  She is tired and doesn't want to answer all of my questions -- tells me to "check my records."      PAST MEDICAL & SURGICAL HISTORY:  CAD (coronary artery disease)  Aortic stenosis (low-gradient)  S/p mitral valve repair  Paroxysmal atrial fibrillatio  ICH    SOCIAL HISTORY:   Alcohol: Denied  Smoking: Nonsmoker  Assisted living facility resident    FAMILY HISTORY: No pertinent family history in first degree relatives    Home Medications:  acetaminophen 500 mg oral tablet: 2 tab(s) orally every 8 hours, As Needed (29 Oct 2022 17:02)  Aspirin Enteric Coated 81 mg oral delayed release tablet: 2 tab(s) orally once a day (29 Oct 2022 17:02)  betamethasone dipropionate 0.05% topical ointment: Apply topically to affected area 2 times a day, As Needed (29 Oct 2022 17:02)  Caltrate 600 + D oral tablet: 1 tab(s) orally 2 times a day (29 Oct 2022 17:02)  ICaps AREDS oral capsule: 2 cap(s) orally once a day (29 Oct 2022 17:02)  levothyroxine 100 mcg (0.1 mg) oral tablet: 1 tab(s) orally once a day (29 Oct 2022 17:02)  metoprolol succinate 25 mg oral tablet, extended release: 1 tab(s) orally once a day (29 Oct 2022 17:02)  Multiple Vitamins oral tablet: 1 tab(s) orally once a day (29 Oct 2022 17:02)  ocular lubricant ophthalmic solution: 1 drop(s) to each affected eye 2 times a day, As needed, Dry Eyes (04 Nov 2022 13:53)  pravastatin 40 mg oral tablet: 1 tab(s) orally once a day (29 Oct 2022 17:02)  sucralfate 1 g oral tablet: 1 tab(s) orally 2 times a day (29 Oct 2022 17:02)    Allergies  aspirin (Unknown) -- patient reports an allergy to "adult aspirin" but takes "2 baby aspirins nightly" with no adverse reaction  cinnamon (Other)  Flomax (Unknown)  tramadol (Unknown)    REVIEW OF SYSTEMS:  CONSTITUTIONAL: No weakness, fevers or chills  Eyes: No visual changes  NECK: No pain  RESPIRATORY: No cough, wheezing, hemoptysis  CARDIOVASCULAR: No chest pain or palpitations  GASTROINTESTINAL: No abdominal pain. No nausea, vomiting  GENITOURINARY: No dysuria  NEUROLOGICAL: No numbness.  SKIN: No itching or rash  All other review of systems is negative unless indicated above    VITAL SIGNS:   Vital Signs Last 24 Hrs  T(C): 36.6 (15 Mar 2023 07:07), Max: 36.6 (15 Mar 2023 07:07)  T(F): 97.8 (15 Mar 2023 07:07), Max: 97.8 (15 Mar 2023 07:07)  HR: 71 (15 Mar 2023 06:53) (69 - 79)  BP: 139/62 (15 Mar 2023 06:53) (139/62 - 161/84)  BP(mean): 85 (15 Mar 2023 06:53) (85 - 105)  RR: 15 (15 Mar 2023 06:53) (15 - 18)  SpO2: 94% (15 Mar 2023 06:53) (94% - 96%)    PHYSICAL EXAM:  Constitutional: NAD, awake and alert, appears fatigued - talks to me with eyes closed  HEENT:   No oral cyanosis.  Pulmonary: Non-labored, breath sounds are clear bilaterally, No wheezing, rales or rhonchi  Cardiovascular: S1 and S2, regular rate and rhythm, III/VI systolic murmur  Gastrointestinal: Bowel Sounds present, soft, nontender.   Lymph: Mill bilateral pedal edema.   Neurological: Alert, moves all extremities, forgetful  Skin: No rashes.  Psych:  Mood & affect appropriate    LABS:                     10.6   6.88  )-----------( 251      ( 15 Mar 2023 05:46 )             32.8     136    |  105    |  25     ----------------------------<  104    3.9     |  26     |  0.72     Ca    9.3        15 Mar 2023 05:46    TPro  7.1    /  Alb  3.2    /  TBili  0.4    /  DBili  x      /  AST  18     /  ALT  21     /  AlkPhos  55     15 Mar 2023 05:46    TroponinI hsT: 354.10    CT Head No Cont (03.15.23 @ 06:14):  No acute intracranial hemorrhage, mass effect, or evidence of acute vascular territorial infarction.     Cardiac Catheterization (11.02.22 @ 15:04):  LMCA: Mild diffuse atherosclerosis  LAD: Mild diffuse, calcified disease  LCx: Mild diffuse disease  RCA: Mild diffuse atherosclerosis  Ejection Fraction 45%  Low gradient aortic stenosis.    TTE Echo Complete w/o Contrast w/ Doppler (11.01.22 @ 08:21):   Significant fibrocalcific changes noted to the aortic valve leaflets with restriction in leaflet excursion. Calculated LAURA is .85cm2 ; this finding is consistent with severe aortic stenosis.   The left atrium is moderately dilated.   Mild concentric left ventricular hypertrophy is present. Moderately reduced left ventricular systolic function with inferior wall akinesis. Estimated left ventricular ejection fraction is 40-45 %.   The IVC appears normal.   Moderate (2+) mitral regurgitation is present.   Mild mitral annular calcification is present. Fibrocalcific changes noted to the mitral valve repair-posterior leaflet with reduced leaflet excursion.   No evidence of pericardial effusion.   No evidence of pleural effusion.   Normal appearing pulmonic valve structure and function.   Normal appearing right atrium.   Normal appearing right ventricle structure and function.   Mild (1+) tricuspid valve regurgitation is present.    ECG: SR, LVH, nonspecific ST/T abnormality

## 2023-03-15 NOTE — ED PROVIDER NOTE - CARE PLAN
1 Principal Discharge DX:	Dizziness   Principal Discharge DX:	Elevated troponin level  Secondary Diagnosis:	Dizziness

## 2023-03-15 NOTE — H&P ADULT - HISTORY OF PRESENT ILLNESS
89 year old woman with a history of minimal CAD (cath 11/2022), severe aortic stenosis (low-gradient), mitral valve repair, paroxysmal atrial fibrillation (no chronic anticoagulation due to falls and past ICH), TIA who presented to the ER following a fall.  She reports feeling dizzy and losing her balance during an early AM walk to bathroom -- subsequently fell; does not think she lost consciousness and only complains of knee pain.  She denies chest pain, palpitations.  She describes mild dyspnea when she "bends over" but otherwise not SOB      ED course: trop was 354. Cardiology recc repeat troponin. No tele events thus far.           PAST MEDICAL & SURGICAL HISTORY:  CAD (coronary artery disease)  Aortic stenosis (low-gradient)  S/p mitral valve repair  Paroxysmal atrial fibrillatio  ICH    SOCIAL HISTORY:   Alcohol: Denied  Smoking: Nonsmoker  Assisted living facility resident    FAMILY HISTORY: No pertinent family history in first degree relatives    EKG: NSR, no dynamic stt changes, flat t waves in V1-3  TNI : 354  VSS  CTH: negative  CT right knee: small hematoma with moderate surround ing edema

## 2023-03-15 NOTE — ED ADULT NURSE REASSESSMENT NOTE - NS ED NURSE REASSESS COMMENT FT1
Report received from ROBERT Garcia. Pt A&Ox4, resting comfortably. Denies pain. VSS, respirations equal and unlabored. Pending repeat Troponin and bed assignment.

## 2023-03-16 ENCOUNTER — TRANSCRIPTION ENCOUNTER (OUTPATIENT)
Age: 88
End: 2023-03-16

## 2023-03-16 VITALS — WEIGHT: 123.9 LBS

## 2023-03-16 LAB
A1C WITH ESTIMATED AVERAGE GLUCOSE RESULT: 5.4 % — SIGNIFICANT CHANGE UP (ref 4–5.6)
ANION GAP SERPL CALC-SCNC: 6 MMOL/L — SIGNIFICANT CHANGE UP (ref 5–17)
BUN SERPL-MCNC: 18 MG/DL — SIGNIFICANT CHANGE UP (ref 7–23)
CALCIUM SERPL-MCNC: 8.4 MG/DL — LOW (ref 8.5–10.1)
CHLORIDE SERPL-SCNC: 110 MMOL/L — HIGH (ref 96–108)
CHOLEST SERPL-MCNC: 148 MG/DL — SIGNIFICANT CHANGE UP
CO2 SERPL-SCNC: 27 MMOL/L — SIGNIFICANT CHANGE UP (ref 22–31)
CREAT SERPL-MCNC: 0.68 MG/DL — SIGNIFICANT CHANGE UP (ref 0.5–1.3)
CULTURE RESULTS: SIGNIFICANT CHANGE UP
EGFR: 83 ML/MIN/1.73M2 — SIGNIFICANT CHANGE UP
ESTIMATED AVERAGE GLUCOSE: 108 MG/DL — SIGNIFICANT CHANGE UP (ref 68–114)
GLUCOSE SERPL-MCNC: 90 MG/DL — SIGNIFICANT CHANGE UP (ref 70–99)
HCT VFR BLD CALC: 30.8 % — LOW (ref 34.5–45)
HDLC SERPL-MCNC: 55 MG/DL — SIGNIFICANT CHANGE UP
HGB BLD-MCNC: 9.7 G/DL — LOW (ref 11.5–15.5)
LIPID PNL WITH DIRECT LDL SERPL: 76 MG/DL — SIGNIFICANT CHANGE UP
MCHC RBC-ENTMCNC: 29.1 PG — SIGNIFICANT CHANGE UP (ref 27–34)
MCHC RBC-ENTMCNC: 31.5 GM/DL — LOW (ref 32–36)
MCV RBC AUTO: 92.5 FL — SIGNIFICANT CHANGE UP (ref 80–100)
NON HDL CHOLESTEROL: 94 MG/DL — SIGNIFICANT CHANGE UP
PLATELET # BLD AUTO: 243 K/UL — SIGNIFICANT CHANGE UP (ref 150–400)
POTASSIUM SERPL-MCNC: 4 MMOL/L — SIGNIFICANT CHANGE UP (ref 3.5–5.3)
POTASSIUM SERPL-SCNC: 4 MMOL/L — SIGNIFICANT CHANGE UP (ref 3.5–5.3)
RBC # BLD: 3.33 M/UL — LOW (ref 3.8–5.2)
RBC # FLD: 12.7 % — SIGNIFICANT CHANGE UP (ref 10.3–14.5)
SODIUM SERPL-SCNC: 143 MMOL/L — SIGNIFICANT CHANGE UP (ref 135–145)
SPECIMEN SOURCE: SIGNIFICANT CHANGE UP
TRIGL SERPL-MCNC: 88 MG/DL — SIGNIFICANT CHANGE UP
TROPONIN I, HIGH SENSITIVITY RESULT: 321.07 NG/L — HIGH
WBC # BLD: 5.96 K/UL — SIGNIFICANT CHANGE UP (ref 3.8–10.5)
WBC # FLD AUTO: 5.96 K/UL — SIGNIFICANT CHANGE UP (ref 3.8–10.5)

## 2023-03-16 PROCEDURE — 99232 SBSQ HOSP IP/OBS MODERATE 35: CPT

## 2023-03-16 PROCEDURE — 99239 HOSP IP/OBS DSCHRG MGMT >30: CPT

## 2023-03-16 RX ADMIN — SODIUM CHLORIDE 50 MILLILITER(S): 9 INJECTION INTRAMUSCULAR; INTRAVENOUS; SUBCUTANEOUS at 10:30

## 2023-03-16 RX ADMIN — LIDOCAINE 1 PATCH: 4 CREAM TOPICAL at 10:28

## 2023-03-16 RX ADMIN — Medication 1 GRAM(S): at 10:27

## 2023-03-16 RX ADMIN — Medication 81 MILLIGRAM(S): at 10:28

## 2023-03-16 RX ADMIN — Medication 1 TABLET(S): at 10:28

## 2023-03-16 RX ADMIN — LIDOCAINE 1 PATCH: 4 CREAM TOPICAL at 05:39

## 2023-03-16 RX ADMIN — Medication 25 MILLIGRAM(S): at 10:28

## 2023-03-16 RX ADMIN — Medication 100 MICROGRAM(S): at 08:05

## 2023-03-16 NOTE — DIETITIAN INITIAL EVALUATION ADULT - OTHER INFO
89 year old woman with a history of minimal CAD (cath 11/2022), severe aortic stenosis (low-gradient), mitral valve repair, paroxysmal atrial fibrillation (no chronic anticoagulation due to falls and past ICH), TIA who presented to the ER following a fall.  She reports feeling dizzy and losing her balance during an early AM walk to bathroom - subsequently fell; does not think she lost consciousness and only complains of knee pain.   Admit for fall vs syncope r/o ACS    Known to nutr services and dx'd w/ mal on 10/31/22; still meets criteria. EKG -  w/o clear dynamic changes. Reports consuming >75% of trays since admit (x 1 day); ate 2 pancakes, prunes for break fast. Reports UBW of 126-127# x ? time frame; bed scale wt of 124# taken by RD on 3/16/23. Wt hx: 119.7# (admit wt on 10/31/22) - no pertinent wt changes at this time. NFPE reveals moderate-severe muscle/ fat wasting - pt appears thin and frail. Liberalize diet to regular to maximize caloric and nutrient intake. Pt denies all nutr supp; RD educated pt on high protein / kcal foods - pt is receptive. See below for other recommendations.

## 2023-03-16 NOTE — DIETITIAN INITIAL EVALUATION ADULT - PERTINENT LABORATORY DATA
03-16    143  |  110<H>  |  18  ----------------------------<  90  4.0   |  27  |  0.68    Ca    8.4<L>      16 Mar 2023 07:49    TPro  7.1  /  Alb  3.2<L>  /  TBili  0.4  /  DBili  x   /  AST  18  /  ALT  21  /  AlkPhos  55  03-15  A1C with Estimated Average Glucose Result: 5.2 % (10-30-22 @ 06:52)

## 2023-03-16 NOTE — DIETITIAN INITIAL EVALUATION ADULT - CALCULATED TO (CAL/KG)
Current Mental Health Symptoms: anxiety with tremors, perseverative thoughts, attempts to keep pt in present and put perseverative thoughts in background. On verge of tears, but has not cried this shift. Has not needed to suggest relaxing in her room. Reports tightness in shoulders- warm blanket has help. Attending groups.     Interventions: Tremors are new accompanied with wobbly gait- walker given until tremors and gait improved.      PRNs: [id number] Risperdal 0.25 mg for anxiety- improved with decrease in tremors.     Self Care: afraid of shower    Follow Up Recommendations: cognitive testing will be done tomorrow.         1967

## 2023-03-16 NOTE — DISCHARGE NOTE PROVIDER - DETAILS OF MALNUTRITION DIAGNOSIS/DIAGNOSES
This patient has been assessed with a concern for Malnutrition and was treated during this hospitalization for the following Nutrition diagnosis/diagnoses:     -  03/16/2023: Severe protein-calorie malnutrition

## 2023-03-16 NOTE — PROGRESS NOTE ADULT - PROBLEM SELECTOR PLAN 2
·  Problem: Aortic stenosis.   ·  Recommendation: Low-gradient, severe AS -- patient met with Dr Ramirez in late November 2022 and she was reluctant to pursue intervention; additional imaging was planned for Spring 2023

## 2023-03-16 NOTE — PROGRESS NOTE ADULT - SUBJECTIVE AND OBJECTIVE BOX
CHIEF COMPLAINT: Patient is a 89y old  Female who presents with a chief complaint of knee pain    HPI: 89 year old woman with a history of minimal CAD (cath 11/2022), severe aortic stenosis (low-gradient), mitral valve repair, paroxysmal atrial fibrillation (no chronic anticoagulation due to falls and past ICH), TIA who presented to the ER following a fall.  She reports feeling dizzy and losing her balance during an early AM walk to bathroom -- subsequently fell; does not think she lost consciousness and only complains of knee pain.  She denies chest pain, palpitations.  She describes mild dyspnea when she "bends over" but otherwise not SOB.  She is tired and doesn't want to answer all of my questions -- tells me to "check my records."    Cardiology consulted for elevated troponin.    3/16/23: denies chest pain/SOB/palpitations, c/o hoarse voice; Telke: NSR 70s PVCs, 42 sec AIVR     Home Medications:  acetaminophen 500 mg oral tablet: 2 tab(s) orally every 8 hours, As Needed (29 Oct 2022 17:02)  Aspirin Enteric Coated 81 mg oral delayed release tablet: 2 tab(s) orally once a day (29 Oct 2022 17:02)  betamethasone dipropionate 0.05% topical ointment: Apply topically to affected area 2 times a day, As Needed (29 Oct 2022 17:02)  Caltrate 600 + D oral tablet: 1 tab(s) orally 2 times a day (29 Oct 2022 17:02)  ICaps AREDS oral capsule: 2 cap(s) orally once a day (29 Oct 2022 17:02)  levothyroxine 100 mcg (0.1 mg) oral tablet: 1 tab(s) orally once a day (29 Oct 2022 17:02)  metoprolol succinate 25 mg oral tablet, extended release: 1 tab(s) orally once a day (29 Oct 2022 17:02)  Multiple Vitamins oral tablet: 1 tab(s) orally once a day (29 Oct 2022 17:02)  ocular lubricant ophthalmic solution: 1 drop(s) to each affected eye 2 times a day, As needed, Dry Eyes (04 Nov 2022 13:53)  pravastatin 40 mg oral tablet: 1 tab(s) orally once a day (29 Oct 2022 17:02)  sucralfate 1 g oral tablet: 1 tab(s) orally 2 times a day (29 Oct 2022 17:02)    MEDICATIONS  (STANDING):  aspirin enteric coated 81 milliGRAM(s) Oral daily  atorvastatin 40 milliGRAM(s) Oral at bedtime  enoxaparin Injectable 40 milliGRAM(s) SubCutaneous every 24 hours  levothyroxine 100 MICROGram(s) Oral daily  lidocaine   4% Patch 1 Patch Transdermal daily  metoprolol succinate ER 25 milliGRAM(s) Oral daily  multivitamin 1 Tablet(s) Oral daily  sodium chloride 0.9%. 1000 milliLiter(s) (50 mL/Hr) IV Continuous <Continuous>  sucralfate 1 Gram(s) Oral two times a day      Vital Signs Last 24 Hrs  T(C): 36.8 (16 Mar 2023 09:10), Max: 36.8 (16 Mar 2023 09:10)  T(F): 98.2 (16 Mar 2023 09:10), Max: 98.2 (16 Mar 2023 09:10)  HR: 68 (16 Mar 2023 09:10) (65 - 71)  BP: 135/59 (16 Mar 2023 09:10) (102/54 - 154/92)  BP(mean): --  RR: 18 (16 Mar 2023 09:10) (18 - 18)  SpO2: 97% (16 Mar 2023 09:10) (95% - 100%)    Parameters below as of 16 Mar 2023 09:10  Patient On (Oxygen Delivery Method): room air      PHYSICAL EXAM:  Constitutional: NAD, awake and alert, appears fatigued - talks to me with eyes closed  HEENT:   No oral cyanosis.  Pulmonary: Non-labored, breath sounds are clear bilaterally, No wheezing, rales or rhonchi  Cardiovascular: S1 and S2, regular rate and rhythm, III/VI systolic murmur  Gastrointestinal: Bowel Sounds present, soft, nontender.   Lymph: Mill bilateral pedal edema.   Neurological: Alert, moves all extremities, forgetful  Skin: No rashes.  Psych:  Mood & affect appropriate    LABS:                     10.6   6.88  )-----------( 251      ( 15 Mar 2023 05:46 )             32.8     136    |  105    |  25     ----------------------------<  104    3.9     |  26     |  0.72     Ca    9.3        15 Mar 2023 05:46    TPro  7.1    /  Alb  3.2    /  TBili  0.4    /  DBili  x      /  AST  18     /  ALT  21     /  AlkPhos  55     15 Mar 2023 05:46    TroponinI hsT: 354.10    CT Head No Cont (03.15.23 @ 06:14):  No acute intracranial hemorrhage, mass effect, or evidence of acute vascular territorial infarction.     Cardiac Catheterization (11.02.22 @ 15:04):  LMCA: Mild diffuse atherosclerosis  LAD: Mild diffuse, calcified disease  LCx: Mild diffuse disease  RCA: Mild diffuse atherosclerosis  Ejection Fraction 45%  Low gradient aortic stenosis.    TTE Echo Complete w/o Contrast w/ Doppler (11.01.22 @ 08:21):   Significant fibrocalcific changes noted to the aortic valve leaflets with restriction in leaflet excursion. Calculated LAURA is .85cm2 ; this finding is consistent with severe aortic stenosis.   The left atrium is moderately dilated.   Mild concentric left ventricular hypertrophy is present. Moderately reduced left ventricular systolic function with inferior wall akinesis. Estimated left ventricular ejection fraction is 40-45 %.   The IVC appears normal.   Moderate (2+) mitral regurgitation is present.   Mild mitral annular calcification is present. Fibrocalcific changes noted to the mitral valve repair-posterior leaflet with reduced leaflet excursion.   No evidence of pericardial effusion.   No evidence of pleural effusion.   Normal appearing pulmonic valve structure and function.   Normal appearing right atrium.   Normal appearing right ventricle structure and function.   Mild (1+) tricuspid valve regurgitation is present.    ECG: SR, LVH, nonspecific ST/T abnormality       REASON FOR VISIT:  AS    HPI: 89 year old woman with a history of minimal CAD (cath 11/2022), severe aortic stenosis (low-gradient), mitral valve repair, paroxysmal atrial fibrillation (no chronic anticoagulation due to falls and past ICH), TIA who presented to the ER following a fall.  She reports feeling dizzy and losing her balance during an early AM walk to bathroom -- subsequently fell; does not think she lost consciousness and only complains of knee pain.  She denies chest pain, palpitations.  She describes mild dyspnea when she "bends over" but otherwise not SOB.  She is tired and doesn't want to answer all of my questions -- tells me to "check my records."    Cardiology consulted for elevated troponin.    3/16/23: denies chest pain/SOB/palpitations, c/o hoarse voice; Telke: NSR 70s PVCs, 42 sec AIVR     Home Medications:  acetaminophen 500 mg oral tablet: 2 tab(s) orally every 8 hours, As Needed (29 Oct 2022 17:02)  Aspirin Enteric Coated 81 mg oral delayed release tablet: 2 tab(s) orally once a day (29 Oct 2022 17:02)  betamethasone dipropionate 0.05% topical ointment: Apply topically to affected area 2 times a day, As Needed (29 Oct 2022 17:02)  Caltrate 600 + D oral tablet: 1 tab(s) orally 2 times a day (29 Oct 2022 17:02)  ICaps AREDS oral capsule: 2 cap(s) orally once a day (29 Oct 2022 17:02)  levothyroxine 100 mcg (0.1 mg) oral tablet: 1 tab(s) orally once a day (29 Oct 2022 17:02)  metoprolol succinate 25 mg oral tablet, extended release: 1 tab(s) orally once a day (29 Oct 2022 17:02)  Multiple Vitamins oral tablet: 1 tab(s) orally once a day (29 Oct 2022 17:02)  ocular lubricant ophthalmic solution: 1 drop(s) to each affected eye 2 times a day, As needed, Dry Eyes (04 Nov 2022 13:53)  pravastatin 40 mg oral tablet: 1 tab(s) orally once a day (29 Oct 2022 17:02)  sucralfate 1 g oral tablet: 1 tab(s) orally 2 times a day (29 Oct 2022 17:02)    MEDICATIONS  (STANDING):  aspirin enteric coated 81 milliGRAM(s) Oral daily  atorvastatin 40 milliGRAM(s) Oral at bedtime  enoxaparin Injectable 40 milliGRAM(s) SubCutaneous every 24 hours  levothyroxine 100 MICROGram(s) Oral daily  lidocaine   4% Patch 1 Patch Transdermal daily  metoprolol succinate ER 25 milliGRAM(s) Oral daily  multivitamin 1 Tablet(s) Oral daily  sodium chloride 0.9%. 1000 milliLiter(s) (50 mL/Hr) IV Continuous <Continuous>  sucralfate 1 Gram(s) Oral two times a day    Vital Signs Last 24 Hrs  T(C): 36.8 (16 Mar 2023 09:10), Max: 36.8 (16 Mar 2023 09:10)  T(F): 98.2 (16 Mar 2023 09:10), Max: 98.2 (16 Mar 2023 09:10)  HR: 68 (16 Mar 2023 09:10) (65 - 71)  BP: 135/59 (16 Mar 2023 09:10) (102/54 - 154/92)  RR: 18 (16 Mar 2023 09:10) (18 - 18)  SpO2: 97% (16 Mar 2023 09:10) (95% - 100%)    PHYSICAL EXAM:  Constitutional: NAD  HEENT:   No oral cyanosis.  Pulmonary: Non-labored, breath sounds are clear bilaterally  Cardiovascular: S1 and S2, regular rate and rhythm, III/VI systolic murmur  Gastrointestinal: Bowel Sounds present, soft, nontender.   Lymph: Mill bilateral pedal edema.   Neurological: Alert, moves all extremities, forgetful    LABS:                            9.7    5.96  )-----------( 243      ( 16 Mar 2023 07:49 )             30.8     143  |  110<H>  |  18  ----------------------------<  90  4.0   |  27  |  0.68    Ca    8.4<L>      16 Mar 2023 07:49    TPro  7.1  /  Alb  3.2<L>  /  TBili  0.4  /  DBili  x   /  AST  18  /  ALT  21  /  AlkPhos  55  03-15    TroponinI hsT: <-321.07, <-360.40, <-354.10    CT Head No Cont (03.15.23 @ 06:14):  No acute intracranial hemorrhage, mass effect, or evidence of acute vascular territorial infarction.     Cardiac Catheterization (11.02.22 @ 15:04):  LMCA: Mild diffuse atherosclerosis  LAD: Mild diffuse, calcified disease  LCx: Mild diffuse disease  RCA: Mild diffuse atherosclerosis  Ejection Fraction 45%  Low gradient aortic stenosis.    TTE Echo Complete w/o Contrast w/ Doppler (11.01.22 @ 08:21):   Significant fibrocalcific changes noted to the aortic valve leaflets with restriction in leaflet excursion. Calculated LAURA is .85cm2 ; this finding is consistent with severe aortic stenosis.   The left atrium is moderately dilated.   Mild concentric left ventricular hypertrophy is present. Moderately reduced left ventricular systolic function with inferior wall akinesis. Estimated left ventricular ejection fraction is 40-45 %.   The IVC appears normal.   Moderate (2+) mitral regurgitation is present.   Mild mitral annular calcification is present. Fibrocalcific changes noted to the mitral valve repair-posterior leaflet with reduced leaflet excursion.   No evidence of pericardial effusion.   No evidence of pleural effusion.   Normal appearing pulmonic valve structure and function.   Normal appearing right atrium.   Normal appearing right ventricle structure and function.   Mild (1+) tricuspid valve regurgitation is present.    ECG: SR, LVH, nonspecific ST/T abnormality

## 2023-03-16 NOTE — DIETITIAN INITIAL EVALUATION ADULT - ENTER FROM (ML/KG)
Date & Time: 3/5/2024, 8:15 PM  Patient: Hunter Torres  Encounter Provider(s):    Bill Hubbard DO       To Whom It May Concern:    Hunter Torres was seen and treated in our department on 3/5/2024. He can return to work on 3/6/2024 with no restrictions.    If you have any questions or concerns, please do not hesitate to call.        _____________________________  Physician/APC Signature           
25

## 2023-03-16 NOTE — DISCHARGE NOTE PROVIDER - NSDCMRMEDTOKEN_GEN_ALL_CORE_FT
Aspirin Enteric Coated 81 mg oral delayed release tablet: 2 tab(s) orally once a day  Caltrate 600 + D oral tablet: 1 tab(s) orally 2 times a day  ICaps AREDS oral capsule: 2 cap(s) orally once a day  levothyroxine 100 mcg (0.1 mg) oral tablet: 1 tab(s) orally once a day  metoprolol succinate 25 mg oral tablet, extended release: 1 tab(s) orally once a day  Multiple Vitamins oral tablet: 1 tab(s) orally once a day  ocular lubricant ophthalmic solution: 1 drop(s) to each affected eye 2 times a day, As needed, Dry Eyes  pravastatin 40 mg oral tablet: 1 tab(s) orally once a day  sucralfate 1 g oral tablet: 1 tab(s) orally 2 times a day

## 2023-03-16 NOTE — DIETITIAN INITIAL EVALUATION ADULT - ADD RECOMMEND
1) Liberalize diet to regular to maximize caloric and nutrient intake.  2) Encourage protein-rich foods, maximize food preferences  3) Monitor bowel movements, if no BM for >3 days, consider implementing bowel regimen.  4) MVI w/ minerals daily to ensure 100% RDA met  5) Consider adding thiamine 200 mg daily 2/2 poor PO intake/ malnutrition  6) Encourage protein-rich foods, maximize food preferences  7) Confirm goals of care regarding nutrition support  RD will continue to monitor PO intake, labs, hydration, and wt prn.

## 2023-03-16 NOTE — PROGRESS NOTE ADULT - NS ATTEND AMEND GEN_ALL_CORE FT
No apparent cardiac decompensation -- she should f/u with Dr Ramirez upon hospital discharge for structural heart f/u (severe low-gradient AS).

## 2023-03-16 NOTE — DIETITIAN INITIAL EVALUATION ADULT - NSFNSGIIOFT_GEN_A_CORE
I&O's Detail    15 Mar 2023 07:01  -  16 Mar 2023 07:00  --------------------------------------------------------  IN:  Total IN: 0 mL    OUT:    Voided (mL): 350 mL  Total OUT: 350 mL    Total NET: -350 mL

## 2023-03-16 NOTE — DISCHARGE NOTE PROVIDER - NSDCCPCAREPLAN_GEN_ALL_CORE_FT
PRINCIPAL DISCHARGE DIAGNOSIS  Diagnosis: Elevated troponin level  Assessment and Plan of Treatment: Follow up outpatient with Dr. Ramirez      SECONDARY DISCHARGE DIAGNOSES  Diagnosis: Aortic stenosis  Assessment and Plan of Treatment: Reccomend outpatient follow up with Dr. Ramirez.    Diagnosis: Dizziness  Assessment and Plan of Treatment: May be possibly secondary to your severe aortic stenosis.   Reccomend outpatient follow up with Dr. Ramirez.

## 2023-03-16 NOTE — PROGRESS NOTE ADULT - PROBLEM SELECTOR PLAN 3
·  Problem: PAF (paroxysmal atrial fibrillation).   ·  Recommendation: Currently in SR - not chronically anticoagulated due to falls.

## 2023-03-16 NOTE — DISCHARGE NOTE NURSING/CASE MANAGEMENT/SOCIAL WORK - NSDCPEFALRISK_GEN_ALL_CORE
For information on Fall & Injury Prevention, visit: [email or web address] OR  [email or web address] OR  https://www.cdc.gov/steadi/patient.html

## 2023-03-16 NOTE — DIETITIAN INITIAL EVALUATION ADULT - PERTINENT MEDS FT
MEDICATIONS  (STANDING):  aspirin enteric coated 81 milliGRAM(s) Oral daily  atorvastatin 40 milliGRAM(s) Oral at bedtime  enoxaparin Injectable 40 milliGRAM(s) SubCutaneous every 24 hours  levothyroxine 100 MICROGram(s) Oral daily  lidocaine   4% Patch 1 Patch Transdermal daily  metoprolol succinate ER 25 milliGRAM(s) Oral daily  multivitamin 1 Tablet(s) Oral daily  sodium chloride 0.9%. 1000 milliLiter(s) (50 mL/Hr) IV Continuous <Continuous>  sucralfate 1 Gram(s) Oral two times a day    MEDICATIONS  (PRN):  acetaminophen     Tablet .. 650 milliGRAM(s) Oral every 6 hours PRN Temp greater or equal to 38C (100.4F), Mild Pain (1 - 3)  aluminum hydroxide/magnesium hydroxide/simethicone Suspension 30 milliLiter(s) Oral every 4 hours PRN Dyspepsia  artificial  tears Solution 1 Drop(s) Both EYES two times a day PRN Dry Eyes  melatonin 3 milliGRAM(s) Oral at bedtime PRN Insomnia  ondansetron Injectable 4 milliGRAM(s) IV Push every 8 hours PRN Nausea and/or Vomiting

## 2023-03-16 NOTE — PROGRESS NOTE ADULT - PROBLEM SELECTOR PLAN 1
·  Problem: Troponin level elevated.   ·  Recommendation: Elevated troponin -- found incidentally following a fall; no associated ischemic symptoms and with recent LHC demonstrating minimal CAD.  Troponin was similarly elevated during 11/2022 hospitalization (256) -- may be related to cardiomyopathy / severe aortic stenosis.  Repeat troponin - trended down, denies chest pain/SOB/palpitations    pt. stable, will sign off, consult prn

## 2023-03-16 NOTE — DISCHARGE NOTE PROVIDER - CARE PROVIDER_API CALL
Dnaiel Ramirez)  Cardiology; Internal Medicine; Interventional Cardiology  [address], [address]  [place], [place] [address]  Phone: [phone]  Fax: [phone]  Follow Up Time:

## 2023-03-16 NOTE — DIETITIAN INITIAL EVALUATION ADULT - ORAL INTAKE PTA/DIET HISTORY
Pt lives at [facility]; reports "good" appetite, consumes 3 meals/day, likely w/ fair po intake 2/2 pain, nausea x 3 days

## 2023-03-16 NOTE — DISCHARGE NOTE NURSING/CASE MANAGEMENT/SOCIAL WORK - PATIENT PORTAL LINK FT
You can access the FollowMyHealth Patient Portal offered by [facility] by registering at the following website: [email or web address]. By joining [employer]’s FollowMyHealth portal, you will also be able to view your health information using other applications (apps) compatible with our system.

## 2023-03-16 NOTE — DISCHARGE NOTE PROVIDER - NSDCFUSCHEDAPPT_GEN_ALL_CORE_FT
Daniel Ramirez  [facility]  CARDIOLOGY [address]  Scheduled Appointment: 05/03/2023

## 2023-03-16 NOTE — DISCHARGE NOTE PROVIDER - HOSPITAL COURSE
Vital Signs Last 24 Hrs  T(C): 36.8 (16 Mar 2023 09:10), Max: 36.8 (16 Mar 2023 09:10)  T(F): 98.2 (16 Mar 2023 09:10), Max: 98.2 (16 Mar 2023 09:10)  HR: 68 (16 Mar 2023 09:10) (67 - 71)  BP: 135/59 (16 Mar 2023 09:10) (102/54 - 154/92)  BP(mean): --  RR: 18 (16 Mar 2023 09:10) (18 - 18)  SpO2: 97% (16 Mar 2023 09:10) (95% - 99%)    Parameters below as of 16 Mar 2023 09:10  Patient On (Oxygen Delivery Method): room air        HEENT:   pupils equal and reactive, EOMI, no oropharyngeal lesions, erythema, exudates, oral thrush    NECK:   supple, no carotid bruits, no palpable lymph nodes, no thyromegaly    CV:  +S1, +S2, regular, no murmurs or rubs    RESP:   lungs clear to auscultation bilaterally, no wheezing, rales, rhonchi, good air entry bilaterally    BREAST:  not examined    GI:  abdomen soft, non-tender, non-distended, normal BS, no bruits, no abdominal masses, no palpable masses    RECTAL:  not examined    :  not examined    MSK:   normal muscle tone, no atrophy, no rigidity, no contractions    EXT:   no clubbing, no cyanosis, no edema, no calf pain, swelling or erythema    VASCULAR:  pulses equal and symmetric in the upper and lower extremities    NEURO:  AAOX3, no focal neurological deficits, follows all commands, able to move extremities spontaneously    SKIN:  no ulcers, lesions or rashes    Urinalysis Basic - ( 15 Mar 2023 06:23 )    Color: Yellow / Appearance: Clear / S.005 / pH: x  Gluc: x / Ketone: Negative  / Bili: Negative / Urobili: Negative   Blood: x / Protein: Negative / Nitrite: Negative   Leuk Esterase: Negative / RBC: x / WBC x   Sq Epi: x / Non Sq Epi: x / Bacteria: x    16 Mar 2023 07:49    143    |  110    |  18     ----------------------------<  90     4.0     |  27     |  0.68     Ca    8.4        16 Mar 2023 07:49    TPro  7.1    /  Alb  3.2    /  TBili  0.4    /  DBili  x      /  AST  18     /  ALT  21     /  AlkPhos  55     15 Mar 2023 05:46  LIVER FUNCTIONS - ( 15 Mar 2023 05:46 )  Alb: 3.2 g/dL / Pro: 7.1 gm/dL / ALK PHOS: 55 U/L / ALT: 21 U/L / AST: 18 U/L / GGT: x         CBC Full  -  ( 16 Mar 2023 07:49 )  WBC Count : 5.96 K/uL  Hemoglobin : 9.7 g/dL  Hematocrit : 30.8 %  Platelet Count - Automated : 243 K/uL  Mean Cell Volume : 92.5 fl  Mean Cell Hemoglobin : 29.1 pg  Mean Cell Hemoglobin Concentration : 31.5 gm/dL          Hospital Course:     89 year old woman with a history of minimal CAD (cath 2022), severe aortic stenosis (low-gradient), mitral valve repair, paroxysmal atrial fibrillation (no chronic anticoagulation due to falls and past ICH), TIA who presented to the ER following a fall.      1- Elevated troponin -- found incidentally following a fall; no associated ischemic symptoms and with recent LHC demonstrating minimal CAD.  Troponin was similarly elevated during 2022 hospitalization (256) -- may be related to cardiomyopathy / severe aortic stenosis.  Repeat troponin - trended down, denies chest pain/SOB/palpitations    2-Aortic stenosis.   ·  Recommendation: Low-gradient, severe AS -- patient met with Dr Ramirez in late 2022 and she was reluctant to pursue intervention; additional imaging was planned for Spring 2023.  -No apparent cardiac decompensation -- she should f/u with Dr Ramirez upon hospital discharge for structural heart f/u (severe low-gradient AS).    3- PAF (paroxysmal atrial fibrillation).    Currently in SR - not chronically anticoagulated due to falls.

## 2023-03-18 NOTE — ED ADULT TRIAGE NOTE - BRAND OF SECOND COVID-19 BOOSTER
Attempted contact x3 w/ NP ADAMS Guzman, patient's daughter, patient [employer] JERONIMO Guzman, ADAMS Conley, patient's daughter, patient

## 2023-03-21 DIAGNOSIS — M17.0 BILATERAL PRIMARY OSTEOARTHRITIS OF KNEE: ICD-10-CM

## 2023-03-21 DIAGNOSIS — Z91.09 OTHER ALLERGY STATUS, OTHER THAN TO DRUGS AND BIOLOGICAL SUBSTANCES: ICD-10-CM

## 2023-03-21 DIAGNOSIS — I42.9 CARDIOMYOPATHY, UNSPECIFIED: ICD-10-CM

## 2023-03-21 DIAGNOSIS — Z79.890 HORMONE REPLACEMENT THERAPY: ICD-10-CM

## 2023-03-21 DIAGNOSIS — Z79.899 OTHER LONG TERM (CURRENT) DRUG THERAPY: ICD-10-CM

## 2023-03-21 DIAGNOSIS — Z86.73 PERSONAL HISTORY OF TRANSIENT ISCHEMIC ATTACK (TIA), AND CEREBRAL INFARCTION WITHOUT RESIDUAL DEFICITS: ICD-10-CM

## 2023-03-21 DIAGNOSIS — Z95.2 PRESENCE OF PROSTHETIC HEART VALVE: ICD-10-CM

## 2023-03-21 DIAGNOSIS — Z79.01 LONG TERM (CURRENT) USE OF ANTICOAGULANTS: ICD-10-CM

## 2023-03-21 DIAGNOSIS — I34.0 NONRHEUMATIC MITRAL (VALVE) INSUFFICIENCY: ICD-10-CM

## 2023-03-21 DIAGNOSIS — I25.10 ATHEROSCLEROTIC HEART DISEASE OF NATIVE CORONARY ARTERY WITHOUT ANGINA PECTORIS: ICD-10-CM

## 2023-03-21 DIAGNOSIS — Z95.1 PRESENCE OF AORTOCORONARY BYPASS GRAFT: ICD-10-CM

## 2023-03-21 DIAGNOSIS — R42 DIZZINESS AND GIDDINESS: ICD-10-CM

## 2023-03-21 DIAGNOSIS — I48.0 PAROXYSMAL ATRIAL FIBRILLATION: ICD-10-CM

## 2023-03-21 DIAGNOSIS — M71.21 SYNOVIAL CYST OF POPLITEAL SPACE [BAKER], RIGHT KNEE: ICD-10-CM

## 2023-03-21 DIAGNOSIS — R77.8 OTHER SPECIFIED ABNORMALITIES OF PLASMA PROTEINS: ICD-10-CM

## 2023-03-21 DIAGNOSIS — E43 UNSPECIFIED SEVERE PROTEIN-CALORIE MALNUTRITION: ICD-10-CM

## 2023-03-21 DIAGNOSIS — Z79.82 LONG TERM (CURRENT) USE OF ASPIRIN: ICD-10-CM

## 2023-03-21 DIAGNOSIS — Z91.018 ALLERGY TO OTHER FOODS: ICD-10-CM

## 2023-03-21 DIAGNOSIS — R01.1 CARDIAC MURMUR, UNSPECIFIED: ICD-10-CM

## 2023-03-21 DIAGNOSIS — M71.22 SYNOVIAL CYST OF POPLITEAL SPACE [BAKER], LEFT KNEE: ICD-10-CM

## 2023-03-21 DIAGNOSIS — I35.0 NONRHEUMATIC AORTIC (VALVE) STENOSIS: ICD-10-CM

## 2023-04-01 NOTE — ED PROVIDER NOTE - IV ALTEPLASE INCLUSION HIDDEN
ASSESSMENT/ PLAN:    Musculoskeletal disorder involving upper trapezius muscle and Biceps tendinitis of left upper extremity  Discussed the conditions and provided the patient with some stretches to do at home.  Plan for prescription Ibuprofen and Cyclobenzaprine as ordered.  We also discussed lifting technique to reduce the stress on the shoulder and the biceps tendon.    - cyclobenzaprine (FLEXERIL) 10 MG tablet; Take 1 tablet by mouth 2 times daily as needed for Muscle spasms.  Dispense: 30 tablet; Refill: 0  - ibuprofen (MOTRIN) 800 MG tablet; Take 1 tablet by mouth 3 times daily as needed for Pain. Take with food.  Dispense: 60 tablet; Refill: 0      See Patient Instruction section  Return if symptoms worsen or fail to improve.    ___________________________________________________  SUBJECTIVE:  Bakari is a 33 year old male who is seen for Urgent Care evaluation of left shoulder pain that started 4 days ago and is worsened by lifting.  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  He notes that most of the pain is in the lateral aspect of the shoulder and will occasionally radiate down.  He has had 2 episodes in the recent past.  He denies any past history of problems with the left shoulder.  He has been taking Ibuprofen for the symptoms with some benefit.      Nursing notes reviewed and accepted.     REVIEW OF SYSTEMS:  In addition to that noted above, review of systems is remarkable for:  Mild left sided neck tightness, some tingling in the left upper extremity    Patient Active Problem List   Diagnosis   • Health care maintenance   • Hyperlipidemia, mild   • Elevated hemoglobin A1c   • Eczema     MEDICATIONS and HISTORY reviewed and updated in the electronic health record.  ALLERGIES:   Allergen Reactions   • Latex HIVES        OBJECTIVE:  Visit Vitals  /88   Pulse 64   Temp 97.9 °F (36.6 °C) (Tympanic)   Resp 16   Wt 89.5 kg (197 lb 4.8 oz)   SpO2 99%   BMI 29.14 kg/m²     General - alert,  cooperative  Eyes - PERRL, conjunctiva normal   Neck - no lymphadenopathy, no thyromegaly and some myospasm is present primarily in the left upper trapezius area with mildly limited lateral rotation when looking to the right.  He has full flexion/extension.    Lungs - normal, easy respirations  Extremities - No cyanosis, clubbing, No edema and examination of the left shoulder reveals no effusion.  There is tenderness reproducing his pain with palpation along the long head of the biceps tendon consistent with a tendonitis.  There is no significant pain over the rotator cuff connections.  He has full active ROM at the shoulder.  There is no apprehension and the empty can test is negative.    Skin - Skin color, texture, turgor are normal. There are no bruises, rashes or lesions that appear significant that are visible.      EPIC chart reviewed today.       show

## 2023-05-03 ENCOUNTER — APPOINTMENT (OUTPATIENT)
Dept: CARDIOLOGY | Facility: CLINIC | Age: 88
End: 2023-05-03
Payer: MEDICARE

## 2023-05-03 PROCEDURE — 99214 OFFICE O/P EST MOD 30 MIN: CPT | Mod: 95

## 2023-05-03 NOTE — HISTORY OF PRESENT ILLNESS
[FreeTextEntry1] : 89 year-old woman with hx of aortic stenosis (low gradient on TTE, cath), mitral valve repair, paroxysmal atrial fibrillation, hx of TIA, chronic unsteady gait, hx of fall, hx of intracranial hemorrhage, no longer on AC, lumbar degenerative changes, spinal stenosis, and R breast mass who presents today for follow up of aortic stenosis. \par \par She was hospitalized for evaluation of R wrist weakness, no other focal complaints. R wrist weakness did start suddenly, while awake. She did on ROS also note chronic, progressively worsening generalized weakness over a period of months. No other complaints. In the ED, CT head was negative for hemorrhage, negative for acute territorial infarction.\par \par She was admitted and underwent left heart cath as part of severe aortic stenosis work up. Low gradients on cath and echo in setting of low EF. She denies any shortness of breath, syncope, chest pain. \par \par Recent fall in [place], resulting in hospitalization. She is overall inactive.Lives in assisted living. \par \par Lives in [place], [place], and is looking for following up locally. She is reluctant to travel either to [facility] or [place]. \par  [Home] : at home, [unfilled] , at the time of the visit. [Medical Office: ([facility])___] : at the medical office located in  [Spouse] : spouse

## 2023-05-03 NOTE — DISCUSSION/SUMMARY
[FreeTextEntry1] : 89 year-old woman with hx of aortic stenosis (low gradient on TTE, cath), mitral valve repair, paroxysmal atrial fibrillation, hx of TIA, chronic unsteady gait, hx of fall, hx of intracranial hemorrhage, no longer on AC, lumbar degenerative changes, spinal stenosis, and R breast mass who presents today for follow up of aortic stenosis. Her EF is 40-45% with quite low gradients on cath as well as TTE, thus I suspect potential LVOT measurement error. Either way, she completely asymptomatic and reluctant to any further work up at this time. Plan for patient to have TTE and CT TAVR protocol in [facility] as she lives in [facility]. Follow up in 3 months after studies. Will follow up with patient with teleheatlh visit after resulting testing to evaluate any further invasive management. \par Direct number \par \par Differential diagnosis and plan of care discussed with patient after the evaluation. \par Counseling on diet, exercise, and medication compliance was done.\par Counseling on smoking and alcohol cessation was offered when appropriate.\par Pain assessed and judicious use of narcotics when appropriate was discussed.\par Importance of fall prevention discussed.\par Advanced care planning was discussed with patient and family.\par

## 2023-05-03 NOTE — CARDIOLOGY SUMMARY
[de-identified] : TTE Echo Complete w/o Contrast w/ Doppler (11.01.22 @ 08:21) >\par  Significant fibrocalcific changes noted to the aortic valve leaflets with restriction in leaflet excursion. Calculated LAURA is .85cm2 ; this finding is consistent with severe aortic stenosis.\par  The left atrium is moderately dilated.\par  Mild concentric left ventricular hypertrophy is present. Moderately reduced left ventricular systolic function with inferior wall akinesis.   Estimated left ventricular ejection fraction is 40-45 %.\par  Moderate (2+) mitral regurgitation is present.\par   Mild (1+) tricuspid valve regurgitation is present.\par Peak 29, mean 18 [de-identified] : Cardiac Cath Diagnostic Conclusions:\par  Non-obstructive coronary artery disease. EF 45% with LVEDP 9. LV-Ao gradient 5.\par  Recommendations:   Recommend aggressive medical management for non-obstructive CAD. Patient to follow up in structural heart office at [facility] regarding further investigation of low gradient aortic stenosis.

## 2023-08-15 NOTE — PROGRESS NOTE ADULT - PROBLEM SELECTOR PLAN 2
Psychotherapy Group Note    PATIENT'S NAME: Que Reyes  MRN:   [id number]  [facility]   1973  [id number]  DATE OF SERVICE: 8/15/23  START TIME: 10:00 AM  END TIME: 10:50 AM  FACILITATOR: Collins Mar Angelo C, LMFT  TOPIC:  EBP Group: Coping Skills  [facility] Adult Partial Hospitalization Program  TRACK: PHP1 and PHP2    NUMBER OF PARTICIPANTS: 4    Summary of Group / Topics Discussed:  Coping Skills: Improve the Moment: Patients learned to tolerate distress, by applying strategies to effect positive change in the present moment.  Patients will identified situations where they would benefit from applying strategies to improve the moment and reduce distress. Patients discussed how to distinguish when this can be useful in their lives or when other strategies would be more relevant or helpful.    Patient Session Goals / Objectives:  Discuss how the use of intentional  in the moment  actions can help reduce distress.  Review patients current practices and discuss a more formal way of practicing and accessing skills.  Increase ability to decide when to use improve the moment strategies  Choose 1-2 in the moment actions to apply during times of distress.    Patient came late and did his process time.   Patient reported feeling: tired and exhausted.   Patient discussed working toward: trying to be present.  Skills they plan to practice addressing the goal include: physical push himself   They identified a potential barrier as: tired.    Patient reported no safety concerns or SIB.  Patient reported no substance use.  Patient reported taking medications as prescribed: yes brought it with him this morning to take it.   Patient reported feeling proud/grateful for: took his kids fishing, had fun and stressful time.       Patient Participation / Response:  Moderately participated, sharing some personal reflections / insights and adequately adequately received / provided feedback with other  participants.    Demonstrated understanding of topics discussed through group discussion and participation, Expressed understanding of the relevance / importance of coping skills at distressing times in life, Demonstrated knowledge of when to consider using a variety of coping skills in daily life, Identified barriers to applying coping skills, and Identified 2-3 positive coping strategies that have helped maintain / improve symptoms in the past    Treatment Plan:  Patient has a current master individualized treatment plan.  See Epic treatment plan for more information.    Lizeth Mar, CRYSTALSW     Elevated troponin without chest pain possible demand ischemia underlying hypertensive heart disease   Echocardiogram ordered to asses LV FX assess for wall motion abnormalities   Continue BB/Statin Elevated troponin without chest pain possible demand ischemia underlying hypertensive heart disease   Echocardiogram ordered to asses LV FX assess for wall motion abnormalities   will need ischemic W/U   Continue BB/Statin Elevated troponin without chest pain possible demand ischemia underlying hypertensive heart disease   Echocardiogram ordered to asses LV FX and assess for wall motion abnormalities   will need ischemic W/U; stress test to be ordered pending MRI results and cleared by Neurology  Continue BB/Statin

## 2023-08-25 ENCOUNTER — EMERGENCY (EMERGENCY)
Facility: HOSPITAL | Age: 88
LOS: 0 days | Discharge: HOME | End: 2023-08-25
Attending: EMERGENCY MEDICINE
Payer: MEDICARE

## 2023-08-25 VITALS
DIASTOLIC BLOOD PRESSURE: 50 MMHG | SYSTOLIC BLOOD PRESSURE: 121 MMHG | RESPIRATION RATE: 16 BRPM | OXYGEN SATURATION: 100 % | HEART RATE: 71 BPM | TEMPERATURE: 98 F

## 2023-08-25 VITALS
SYSTOLIC BLOOD PRESSURE: 144 MMHG | WEIGHT: 184.97 LBS | HEIGHT: 64 IN | TEMPERATURE: 98 F | DIASTOLIC BLOOD PRESSURE: 60 MMHG | OXYGEN SATURATION: 100 % | RESPIRATION RATE: 18 BRPM | HEART RATE: 77 BPM

## 2023-08-25 DIAGNOSIS — S93.401A SPRAIN OF UNSPECIFIED LIGAMENT OF RIGHT ANKLE, INITIAL ENCOUNTER: ICD-10-CM

## 2023-08-25 DIAGNOSIS — Z91.018 ALLERGY TO OTHER FOODS: ICD-10-CM

## 2023-08-25 DIAGNOSIS — Z88.6 ALLERGY STATUS TO ANALGESIC AGENT: ICD-10-CM

## 2023-08-25 DIAGNOSIS — Z95.1 PRESENCE OF AORTOCORONARY BYPASS GRAFT: ICD-10-CM

## 2023-08-25 DIAGNOSIS — M25.571 PAIN IN RIGHT ANKLE AND JOINTS OF RIGHT FOOT: ICD-10-CM
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
## 2023-08-25 DIAGNOSIS — Z88.5 ALLERGY STATUS TO NARCOTIC AGENT: ICD-10-CM
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
## 2023-08-25 DIAGNOSIS — I25.10 ATHEROSCLEROTIC HEART DISEASE OF NATIVE CORONARY ARTERY WITHOUT ANGINA PECTORIS: ICD-10-CM

## 2023-08-25 DIAGNOSIS — Z95.1 PRESENCE OF AORTOCORONARY BYPASS GRAFT: Chronic | ICD-10-CM

## 2023-08-25 DIAGNOSIS — Z79.82 LONG TERM (CURRENT) USE OF ASPIRIN: ICD-10-CM

## 2023-08-25 PROCEDURE — 73610 X-RAY EXAM OF ANKLE: CPT | Mod: RT

## 2023-08-25 PROCEDURE — 99283 EMERGENCY DEPT VISIT LOW MDM: CPT

## 2023-08-25 PROCEDURE — 99284 EMERGENCY DEPT VISIT MOD MDM: CPT | Mod: 25

## 2023-08-25 PROCEDURE — 73610 X-RAY EXAM OF ANKLE: CPT | Mod: 26,RT

## 2023-08-25 PROCEDURE — 73590 X-RAY EXAM OF LOWER LEG: CPT | Mod: 26,RT

## 2023-08-25 PROCEDURE — 73590 X-RAY EXAM OF LOWER LEG: CPT | Mod: RT

## 2023-08-25 RX ORDER — ACETAMINOPHEN 500 MG
650 TABLET ORAL ONCE
Refills: 0 | Status: COMPLETED | OUTPATIENT
Start: 2023-08-25 | End: 2023-08-25

## 2023-08-25 RX ADMIN — Medication 650 MILLIGRAM(S): at 03:41

## 2023-08-25 RX ADMIN — Medication 650 MILLIGRAM(S): at 04:40

## 2023-08-25 NOTE — ED ADULT NURSE NOTE - CHIEF COMPLAINT
Received DENIAL for NovoLOG FlexPen 100UNIT/ML pen-injectors. Denial letter attached. Please advise patient. The patient is a 89y Female complaining of ankle pain/injury.

## 2023-08-25 NOTE — ED PROVIDER NOTE - OBJECTIVE STATEMENT
Patient presents to ED complaining of right ankle pain constant achy since a mechanical fall when she inverted yesterday.  No right knee pain.  No motor or sensory deficits.  No overt ankle swelling.  She describes the pain as constant achy worse with walking with a rollator 5 out of 10 in severity.

## 2023-08-25 NOTE — ED PROVIDER NOTE - PATIENT PORTAL LINK FT
You can access the FollowMyHealth Patient Portal offered by [facility] by registering at the following website: [email or web address]. By joining [employer]’s FollowMyHealth portal, you will also be able to view your health information using other applications (apps) compatible with our system.

## 2023-08-25 NOTE — ED ADULT TRIAGE NOTE - CHIEF COMPLAINT QUOTE
pt bibems from elana. pt A&ox4, had a mechanical trip and fall Thursday afternoon and c/o right ankle pain. pt ambulatory with walker. Right ankle slightly swollen. On ASA. Pt states " I didn't hit my head, I remember falling". GCS 15. VS Stable. no s/s of distress. Medical hx on chart. Pt placed in front of nursing station

## 2023-08-25 NOTE — ED ADULT NURSE NOTE - NSFALLHARMRISKINTERV_ED_ALL_ED

## 2023-08-25 NOTE — ED ADULT NURSE NOTE - OBJECTIVE STATEMENT
pt is a 89 y/o female c/c of R ankle pain s/p fall on Thursday. pt is a 89 y/o female c/c of R ankle pain s/p fall on Thursday. pt AAOX4, clear speech. Swelling noted to R ankle. pt reports difficulty bearing weight, uses walker at baseline. +1 bilateral pedal pulse upon palpation. pt reports taking 2 extra tylenols earlier today.

## 2023-09-18 NOTE — ED PROVIDER NOTE - PHYSICAL EXAMINATION
Interdisciplinary communication with: SN due to pt having order from MD office for lab work to be drawn and to ensure nsg had needed items due to nsg visit today. Discharge planning as follows: Is no longer homebound or goals reached    Specific plan for next visit: ADL safety; HEP; fall prevention; EC; and pt/cg edu. General: AAOx3, NAD  HEENT: NCAT  Cardiac: Normal rate and rhythm, +systolic murmur, normal peripheral perfusion  Respiratory: Normal rate and effort. CTAB  GI: Soft, nondistended, nontender  Neuro: CN II-XII, grossly intact. speech clear. pronator drift negative.  strength 4/5 right, 5/5 left. weakness with extension of first through third digits on right. No other focal deficits. Sensation ot light, touch intact  MSK: FROMx4, no focal bony tenderness, no peripheral edema  Skin: No rash

## 2023-09-29 NOTE — PROVIDER CONTACT NOTE (CRITICAL VALUE NOTIFICATION) - PERSON GIVING RESULT:
Writer called patient and informed him that Dr. Urban has not reviewed the results of his echocardiogram as of yet. He can expect results within the next 1-2 weeks. Informed patient that he should expect call from Dr. Urban's office as soon as they are available. Patient hung up on writer after information conveyed.    lab

## 2023-12-11 ENCOUNTER — EMERGENCY (EMERGENCY)
Facility: HOSPITAL | Age: 88
LOS: 0 days | Discharge: HOME | End: 2023-12-11
Attending: EMERGENCY MEDICINE
Payer: MEDICARE

## 2023-12-11 VITALS
TEMPERATURE: 98 F | HEIGHT: 58 IN | WEIGHT: 130.07 LBS | SYSTOLIC BLOOD PRESSURE: 134 MMHG | HEART RATE: 69 BPM | OXYGEN SATURATION: 98 % | DIASTOLIC BLOOD PRESSURE: 117 MMHG | RESPIRATION RATE: 18 BRPM

## 2023-12-11 VITALS
OXYGEN SATURATION: 100 % | DIASTOLIC BLOOD PRESSURE: 69 MMHG | HEART RATE: 69 BPM | SYSTOLIC BLOOD PRESSURE: 143 MMHG | TEMPERATURE: 98 F | RESPIRATION RATE: 18 BRPM

## 2023-12-11 DIAGNOSIS — S00.93XA CONTUSION OF UNSPECIFIED PART OF HEAD, INITIAL ENCOUNTER: ICD-10-CM
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
## 2023-12-11 DIAGNOSIS — M71.21 SYNOVIAL CYST OF POPLITEAL SPACE [BAKER], RIGHT KNEE: ICD-10-CM

## 2023-12-11 DIAGNOSIS — Z88.6 ALLERGY STATUS TO ANALGESIC AGENT: ICD-10-CM

## 2023-12-11 DIAGNOSIS — I25.10 ATHEROSCLEROTIC HEART DISEASE OF NATIVE CORONARY ARTERY WITHOUT ANGINA PECTORIS: ICD-10-CM

## 2023-12-11 DIAGNOSIS — M25.561 PAIN IN RIGHT KNEE: ICD-10-CM

## 2023-12-11 DIAGNOSIS — Z95.1 PRESENCE OF AORTOCORONARY BYPASS GRAFT: Chronic | ICD-10-CM

## 2023-12-11 DIAGNOSIS — G89.29 OTHER CHRONIC PAIN: ICD-10-CM

## 2023-12-11 DIAGNOSIS — Z91.018 ALLERGY TO OTHER FOODS: ICD-10-CM

## 2023-12-11 DIAGNOSIS — Z88.5 ALLERGY STATUS TO NARCOTIC AGENT: ICD-10-CM
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
## 2023-12-11 PROCEDURE — 72125 CT NECK SPINE W/O DYE: CPT | Mod: 26,MA

## 2023-12-11 PROCEDURE — 70450 CT HEAD/BRAIN W/O DYE: CPT | Mod: MA

## 2023-12-11 PROCEDURE — 72125 CT NECK SPINE W/O DYE: CPT | Mod: MA

## 2023-12-11 PROCEDURE — 73562 X-RAY EXAM OF KNEE 3: CPT | Mod: RT

## 2023-12-11 PROCEDURE — 70450 CT HEAD/BRAIN W/O DYE: CPT | Mod: 26,MA

## 2023-12-11 PROCEDURE — 73552 X-RAY EXAM OF FEMUR 2/>: CPT | Mod: 26,RT

## 2023-12-11 PROCEDURE — 73552 X-RAY EXAM OF FEMUR 2/>: CPT | Mod: RT

## 2023-12-11 PROCEDURE — 73562 X-RAY EXAM OF KNEE 3: CPT | Mod: 26,RT

## 2023-12-11 PROCEDURE — 73502 X-RAY EXAM HIP UNI 2-3 VIEWS: CPT | Mod: 26,RT

## 2023-12-11 PROCEDURE — 99285 EMERGENCY DEPT VISIT HI MDM: CPT

## 2023-12-11 PROCEDURE — 73502 X-RAY EXAM HIP UNI 2-3 VIEWS: CPT | Mod: RT

## 2023-12-11 PROCEDURE — 99284 EMERGENCY DEPT VISIT MOD MDM: CPT | Mod: 25

## 2023-12-11 RX ORDER — ACETAMINOPHEN 500 MG
650 TABLET ORAL ONCE
Refills: 0 | Status: DISCONTINUED | OUTPATIENT
Start: 2023-12-11 | End: 2023-12-11

## 2023-12-11 NOTE — ED PROVIDER NOTE - CLINICAL SUMMARY MEDICAL DECISION MAKING FREE TEXT BOX
patient presents status post fall from bed.  Patient states that she simply rolled out of bed as she was getting up and denies any loss of consciousness.  Patient does not have any laceration on exam.  The patient does not have any cervical spine tenderness on exam.  Will get CT of the head to rule out intracranial hemorrhage.  Will get x-ray of the right knee to evaluate slight increase in patient's chronic knee pain.

## 2023-12-11 NOTE — ED ADULT NURSE NOTE - CHIEF COMPLAINT QUOTE
pt presents to the ED s/p fall. pt reports she takes 2 baby aspirin a day. reports she fell on her ear - denies pain. pt A&Ox4, well appearing, and ambulatory with walker at baseline with no further complaints or discomforts reported at this time. MD Kemp aware

## 2023-12-11 NOTE — ED PROVIDER NOTE - OBJECTIVE STATEMENT
89-year-old female with history of coronary artery disease brought in by EMS for evaluation of right ear and head injury status post rule out of bed.  Patient states that she currently resides at the [facility].  Patient notes that she was sleeping and when she woke up she rolled to the right side falling off the bed hitting her right ear on the carpeted floor.  Patient denies any loss of consciousness.  Patient denies any neck pain, chest pain, shortness of breath, abdominal pain.  Patient notes that she has chronic pain in her right knee as well as a known Baker's cyst.  Patient states that her pain in her right knee is slightly worse after the fall and would like to have that evaluated.  Patient denies any other complaints at this time. 89-year-old female with history of coronary artery disease brought in by EMS for evaluation of right ear and head injury status post rule out of bed.  Patient states that she currently resides at the [facility].  Patient notes that she was sleeping and when she woke up she rolled to the right side falling off the bed hitting her right ear on the carpeted floor.  Patient denies any loss of consciousness.  Patient denies any neck pain, chest pain, shortness of breath, abdominal pain.  Patient notes that she has chronic pain in her right knee as well as a known Baker's cyst.  Patient states that her pain in her right knee is slightly worse after the fall and would like to have that evaluated.  Patient denies any other complaints at this time.

## 2023-12-11 NOTE — ED ADULT TRIAGE NOTE - CHIEF COMPLAINT QUOTE
pt presents to the ED s/p fall. pt reports she takes 2 baby aspirin a day. reports she fell on her ear - denies pain. pt A&Ox4, well appearing, and ambulatory with walker at baseline with no further complaints or discomforts reported at this time. pt presents to the ED s/p fall. pt reports she takes 2 baby aspirin a day. reports she fell on her ear - denies pain. pt A&Ox4, well appearing, and ambulatory with walker at baseline with no further complaints or discomforts reported at this time. MD Kemp aware

## 2023-12-11 NOTE — ED ADULT NURSE NOTE - NSFALLHARMRISKINTERV_ED_ALL_ED
Assistance OOB with selected safe patient handling equipment if applicable/Communicate risk of Fall with Harm to all staff, patient, and family/Provide visual cue: red socks, yellow wristband, yellow gown, etc/Reinforce activity limits and safety measures with patient and family/Bed in lowest position, wheels locked, appropriate side rails in place/Call bell, personal items and telephone in reach/Instruct patient to call for assistance before getting out of bed/chair/stretcher/Non-slip footwear applied when patient is off stretcher/[place] to call system/Physically safe environment - no spills, clutter or unnecessary equipment/Purposeful Proactive Rounding/Room/bathroom lighting operational, light cord in reach Assistance OOB with selected safe patient handling equipment if applicable/Communicate risk of Fall with Harm to all staff, patient, and family/Provide visual cue: red socks, yellow wristband, yellow gown, etc/Reinforce activity limits and safety measures with patient and family/Bed in lowest position, wheels locked, appropriate side rails in place/Call bell, personal items and telephone in reach/Instruct patient to call for assistance before getting out of bed/chair/stretcher/Non-slip footwear applied when patient is off stretcher/[place] to call system/Physically safe environment - no spills, clutter or unnecessary equipment/Purposeful Proactive Rounding/Room/bathroom lighting operational, light cord in reach

## 2023-12-11 NOTE — ED ADULT NURSE NOTE - DRUG PRE-SCREENING (DAST -1)
March 4, 2021       Paulino Morle MD  [address] [place] [id number]  Via In Basket      Patient: Caroline Sharif   YOB: 1966   Date of Visit: 3/3/2021       Dear Dr. Morel:    Thank you for referring Caroline Sharif to me for evaluation. Below are my notes for this visit with her.    If you have questions, please do not hesitate to call me. I look forward to following your patient along with you.      Sincerely,        Estiven Anderson DO        CC: No Recipients  Estiven Anderson DO  3/4/2021  1:05 PM  Signed    REFERRED BY:  Paulino Morel MD   Fax: [phone]       Patient ID: Caroline Sharif is a 54 year old female.      Chief Complaint   Patient presents with   • Cardiac Clearance     BILATERAL STERNAL WIRE REMOVAL  3/24/2021 with  Feliciano Fonseca MD @[facility].   • Follow-up     pt denies chest pain,SOB and dizziness.   • Office Visit         HISTORY OF PRESENT ILLNESS:    I had the pleasure of seeing Caroline Sharif on cardiology office follow up in my [place] office on 3/3/2021.      The patient was independent in her activities of daily and nightly living without symptomatic limitation from cardiovascular symptoms.  Specifically, she denied rest or exertional shortness of breath or chest discomfort.  She further denied orthopnea, paroxysmal nocturnal dyspnea, lower extremity edema, syncope, presyncope, palpitations, or claudication.    She further denied hemoptysis, hematemesis, hematochezia, melena, hematuria, epistaxis, easy bruising or bleeding.    The patient is attempting to practice physical distancing.  She denied fever, chills, sore throat, new changes in taste or smell, new cough, new myalgias, headache, or new diarrhea.    Patient was admitted to [facility] in [facility] on 2/14/2021 patient experienced some shocklike pain in her chest underneath her left breast.  They were brief episodes however concerned her and she went to the emergency room.   She was seen by cardiologist and admitted for couple of days where her pacemaker was interrogated and was not found to have any abnormalities.  Patient since then has had on and off some shocklike pain.  She was asked to follow-up with Dr. Fonseca and she is going to have her sternotomy wires removed in the near future.  She did complain of some mild-shortness of breath with exertion which has gotten slightly worse but not too concerned about this.    No problems updated.    Past Medical History:   Diagnosis Date   • 2019 novel coronavirus disease (COVID-19) 7/13/2020 July 2020   • Acute on chronic diastolic (congestive) heart failure (CMS/HCC)    • Alopecia    • Alopecia    • Anticoagulant long-term use    • Anxiety    • Atrial flutter (CMS/HCC)    • Atrial tachycardia (CMS/HCC)    • Atrial tachycardia (CMS/HCC)    • Atypical atrial flutter (CMS/HCC)    • Atypical chest pain    • Autonomic nervous system disorder    • Broken tooth injury    • CHB (complete heart block) (CMS/HCC) 12/19/2018   • Chronic diastolic heart failure (CMS/HCC)    • Cognitive and behavioral changes 5/15/2019   • Complete heart block, post-surgical (CMS/HCC) 5/15/2019   • Confusion    • Depression    • Depression    • Disequilibrium    • Dysphagia    • Fatigue    • Foot fracture, right 5/15/2019   • History of atrioventricular sophia ablation 5/24/2018    With concomitant CRT device implantation.  Performed 05/24/2018.   • History of breast lump    • History of cardiac catheterization 5/15/2019    Cardiac catheterization performed 06/16/2017.  No angiographic coronary disease seen.  Severe mitral stenosis with a mean transmitral gradient of 12 mmHg.  Pulmonary artery pressure of 36/10 mmHg with a mean pulmonary artery pressure of 22 mmHg.  LVEDP of 2 mmHg.   • History of cardiovascular stress test    • History of chronic cough    • History of echocardiogram    • History of edema    • History of heart surgery    • History of heart surgery  Statement Selected 5/15/2019    Redo redo sternotomy, explantation of a stenotic bioprosthetic mitral valve, redo mitral valve replacement with a 29 mm Saint Dexter mechanical prosthesis, resection of the left atrial appendage, and epicardial LV lead placement 06/26/2017.   • History of mitral valve insufficiency    • History of mitral valve replacement    • History of palpitations    • History of prosthetic mitral valve    • History of rheumatic heart disease    • Hypotension    • Inattention    • PAF (paroxysmal atrial fibrillation) (CMS/HCC)    • Palpitations    • Paroxysmal supraventricular tachycardia (CMS/HCC)    • Permanent atrial fibrillation (CMS/HCC) 2/27/2015   • Polyuria    • Postoperative pain    • Postsurgical menopause    • Presence of biventricular cardiac pacemaker    • Presence of biventricular cardiac pacemaker 12/17/2019   • Reaction, adjustment, with anxious, depressed mood 3/19/2009   • Status post biventricular pacemaker 12/19/2018   • Status post catheter ablation of atrial fibrillation 5/15/2019    Catheter ablation of atrial fibrillation with pulmonary vein isolation 01/29/2016 by Raymond Kawasaki, MD at [facility].  There was apparent marked fibrosis of the left atrium.   • Status post placement of implantable loop recorder    • Sternal pain        Past Surgical History:   Procedure Laterality Date   • Breast biopsy     • Breast lumpectomy     • Cardiac surgery      Removal of sternal wires.   • Cardiac valve replacement      Pericardial tissue valve June 2009.   • Cardiovascular surgery      Redo redo sternotomy, explantation of a stenotic bioprosthetic mitral valve, redo mitral valve replacement with a 29 mm Saint Dexter mechanical prosthesis, resection of a left atrial appendage, and epicardial LV lead placement 06/26/2017.   • Hysterectomy      After failed hysteroscopy with endometrial ablation for DUB.   • Hysteroscopy,ablation endometrium     • Mitral valve repair  1975    Had MR  secondary to rheumatic heart disease.  Mitral annuloplasty/valvuloplasty at [facility].  Had postop bleeding which required reexploration.  Also had atrial flutter post catheterization requiring cardioversion.  Another note made mention of a \"plication\".  1975.   • Pacemaker      CRT-P implanted 05/24/2018.  [employer].   • Salpingoophorectomy  2003    Secondary to ovarian torsion.   • Tubal ligation         ALLERGIES:  No Known Allergies    Current Outpatient Medications   Medication Sig Dispense Refill   • pregabalin (LYRICA) 50 MG capsule Take 1 capsule by mouth 3 times daily. 90 capsule 2   • warfarin (COUMADIN) 2 MG tablet Take 2 or 3 pills as directed daily. 240 tablet 1   • amitriptyline (ELAVIL) 25 MG tablet Take 1 tablet by mouth nightly. 30 tablet 11   • Phenir-PE-Sod Sal-Caff Cit (COUGH/COLD MEDICINE PO)      • estradiol (ESTRACE) 2 MG tablet Take 2 mg by mouth.     • aspirin (ASPIRIN ADULT LOW DOSE) 81 MG EC tablet Take 81 mg by mouth daily.      • furosemide (LASIX) 40 MG tablet Take 40 mg by mouth as needed.      • potassium chloride (MICRO-K, KLOR-CON SPRINKLE) 10 MEQ ER capsule Take 10 mEq by mouth. as needed        No current facility-administered medications for this visit.        Social History     Tobacco Use   • Smoking status: Former Smoker     Packs/day: 0.00   • Smokeless tobacco: Never Used   Substance Use Topics   • Alcohol use: Never     Frequency: Never   • Drug use: Yes     Types: Marijuana     Comment: medical         Family History   Problem Relation Age of Onset   • Hypertension Mother    • Hyperlipidemia Mother    • Cancer, Prostate Mother    • Diabetes Sister    • Myocardial Infarction Neg Hx    • Heart disease Neg Hx    • Coronary Artery Disease Neg Hx        Review of Systems   Constitution: Negative.   HENT: Negative.    Eyes: Negative.    Cardiovascular: Positive for dyspnea on exertion. Negative for chest pain, leg swelling, orthopnea, palpitations and paroxysmal  nocturnal dyspnea.   Respiratory: Negative.  Negative for shortness of breath.    Endocrine: Negative.    Hematologic/Lymphatic: Negative.    Skin: Negative.    Musculoskeletal: Negative.    Gastrointestinal: Negative.    Genitourinary: Negative.    Neurological: Negative.    Psychiatric/Behavioral: Negative.    Allergic/Immunologic: Negative.          PHYSICAL EXAM:  Vitals:    03/03/21 1342 03/03/21 1345   BP: 100/72 104/68   BP Location: RUE - Right upper extremity RUE - Right upper extremity   Patient Position: Sitting Standing   Pulse: 65    Temp: 96.8 °F (36 °C)    TempSrc: Temporal    SpO2: 97%    Weight: 62.1 kg (137 lb)    Height: 5' 4\" (1.626 m)      Physical Exam   Constitutional: She is oriented to person, place, and time. She appears well-developed.   HENT:   Head: Normocephalic.   Eyes: Pupils are equal, round, and reactive to light.   Neck: Normal range of motion.   Cardiovascular: Normal rate, S1 normal, S2 normal, normal heart sounds and intact distal pulses. An irregular rhythm present.   Pulses:       Carotid pulses are 2+ on the right side and 2+ on the left side.       Radial pulses are 2+ on the right side and 2+ on the left side.   Mechanical valve click appreciated   Pulmonary/Chest: Breath sounds normal.   Abdominal: Soft. Bowel sounds are normal.   Musculoskeletal: Normal range of motion.   Neurological: She is alert and oriented to person, place, and time.   Skin: Skin is warm.         RESULTS:  Results for orders placed or performed in visit on 03/03/21   ELECTROCARDIOGRAM 12-LEAD    Impression    Baseline rhythm is atrial fibrillation with ventricular paced rhythm             WBC (K/mcL)   Date Value   08/07/2020 6.3       RBC (mil/mcL)   Date Value   08/07/2020 4.19       HCT (%)   Date Value   08/07/2020 40.4       HGB (g/dL)   Date Value   08/07/2020 12.8       PLT (K/mcL)   Date Value   08/07/2020 228       TSH (mcUnits/mL)   Date Value   08/07/2020 0.872       CHOLESTEROL (mg/dL)    Date Value   08/07/2020 232 (H)       HDL (mg/dL)   Date Value   08/07/2020 97       CHOL/HDL (no units)   Date Value   08/07/2020 2.4       TRIGLYCERIDE (mg/dL)   Date Value   08/07/2020 200 (H)       CALCULATED LDL (mg/dL)   Date Value   08/07/2020 95         IMPRESSION AND PLAN:  1. Chronic diastolic heart failure (CMS/HCC)    2. CHB (complete heart block) (CMS/HCC)    3. Mitral valve disorder    4. Permanent atrial fibrillation    5. Complete heart block, post-surgical (CMS/HCC)    6. Presence of biventricular cardiac pacemaker    7. History of heart surgery    8. History of mitral valve replacement with mechanical valve    9. Pre-operative clearance    10. Autonomic nervous system disorder    11. PAF (paroxysmal atrial fibrillation) (CMS/HCC)    12. Status post biventricular pacemaker    13. Status post catheter ablation of atrial fibrillation          Ms Monroe has recently on 2/14/2021 admitted to [facility] in [place] for shocklike pains in her chest.  Her interrogation of the device has been negative for any abnormality.  It is possible that she has some diaphragmatic stimulation.  Patient will be undergoing removal of sternotomy wires in the future with Dr. Fonseca.    · Continue current management  · Patient to follow-up in 6 months    Discussed with Dr. Monica Starks MD  PGY 5 Cardiology Fellow      Melo Starks MD  3/4/2021  1:04 PM    See no opposition to the patient undergoing sternotomy wire removal by Dr. Fonseca in the near future.      Advocate Attending Note:   I saw and evaluated the patient. I discussed the patient's case with the Fellow/Resident. I agree with the Fellow/Resident's findings and plan, as documented in today's note.

## 2023-12-11 NOTE — ED PROVIDER NOTE - PHYSICAL EXAMINATION
Muscle skeletal exam: 5 out of 5 strength in all 4 extremities, full range of motion of bilateral upper extremities and the shoulders/elbow/wrist.  Patient has tenderness to palpation of the right popliteal fossa but has intact flexion of the right knee.  Patient has 5 out of 5 strength in bilateral hips and is able to perform straight leg raise bilaterally

## 2023-12-11 NOTE — ED PROVIDER NOTE - PATIENT PORTAL LINK FT
You can access the FollowMyHealth Patient Portal offered by [facility] by registering at the following website: [email or web address]. By joining [employer]’s FollowMyHealth portal, you will also be able to view your health information using other applications (apps) compatible with our system. You can access the FollowMyHealth Patient Portal offered by [facility] by registering at the following website: [email or web address]. By joining [employer]’s FollowMyHealth portal, you will also be able to view your health information using other applications (apps) compatible with our system.

## 2024-03-29 NOTE — DIETITIAN INITIAL EVALUATION ADULT - ENERGY INTAKE
Future Appointments  3/29/2024 - 3/29/2026     Date Visit Type Department Provider    5/7/2024  8:00 AM MEDICARE ANNUAL WELL VISIT [facility] Leticia Trimble MD   Appointment Notes:   6 months (around 4/2/2024) for AWV, f/u. ideally fast             Fair (50-75%)

## 2024-06-11 ENCOUNTER — INPATIENT (INPATIENT)
Facility: HOSPITAL | Age: 89
LOS: 1 days | Discharge: HOME | DRG: 204 | End: 2024-06-13
Attending: STUDENT IN AN ORGANIZED HEALTH CARE EDUCATION/TRAINING PROGRAM | Admitting: INTERNAL MEDICINE
Payer: MEDICARE

## 2024-06-11 VITALS
TEMPERATURE: 98 F | HEART RATE: 70 BPM | OXYGEN SATURATION: 96 % | DIASTOLIC BLOOD PRESSURE: 65 MMHG | SYSTOLIC BLOOD PRESSURE: 119 MMHG

## 2024-06-11 DIAGNOSIS — Z95.1 PRESENCE OF AORTOCORONARY BYPASS GRAFT: Chronic | ICD-10-CM

## 2024-06-11 DIAGNOSIS — R06.2 WHEEZING: ICD-10-CM

## 2024-06-11 LAB
ALBUMIN SERPL ELPH-MCNC: 3.5 G/DL — SIGNIFICANT CHANGE UP (ref 3.3–5)
ALP SERPL-CCNC: 58 U/L — SIGNIFICANT CHANGE UP (ref 40–120)
ALT FLD-CCNC: 21 U/L — SIGNIFICANT CHANGE UP (ref 12–78)
ANION GAP SERPL CALC-SCNC: 2 MMOL/L — LOW (ref 5–17)
APTT BLD: 27.9 SEC — SIGNIFICANT CHANGE UP (ref 24.5–35.6)
AST SERPL-CCNC: 15 U/L — SIGNIFICANT CHANGE UP (ref 15–37)
BASOPHILS # BLD AUTO: 0.04 K/UL — SIGNIFICANT CHANGE UP (ref 0–0.2)
BASOPHILS NFR BLD AUTO: 0.7 % — SIGNIFICANT CHANGE UP (ref 0–2)
BILIRUB SERPL-MCNC: 0.2 MG/DL — SIGNIFICANT CHANGE UP (ref 0.2–1.2)
BUN SERPL-MCNC: 34 MG/DL — HIGH (ref 7–23)
CALCIUM SERPL-MCNC: 10.1 MG/DL — SIGNIFICANT CHANGE UP (ref 8.5–10.1)
CHLORIDE SERPL-SCNC: 105 MMOL/L — SIGNIFICANT CHANGE UP (ref 96–108)
CO2 SERPL-SCNC: 30 MMOL/L — SIGNIFICANT CHANGE UP (ref 22–31)
CREAT SERPL-MCNC: 0.88 MG/DL — SIGNIFICANT CHANGE UP (ref 0.5–1.3)
EGFR: 62 ML/MIN/1.73M2 — SIGNIFICANT CHANGE UP
EOSINOPHIL # BLD AUTO: 0.3 K/UL — SIGNIFICANT CHANGE UP (ref 0–0.5)
EOSINOPHIL NFR BLD AUTO: 5.3 % — SIGNIFICANT CHANGE UP (ref 0–6)
GLUCOSE SERPL-MCNC: 123 MG/DL — HIGH (ref 70–99)
HCT VFR BLD CALC: 32.1 % — LOW (ref 34.5–45)
HGB BLD-MCNC: 10.2 G/DL — LOW (ref 11.5–15.5)
IMM GRANULOCYTES NFR BLD AUTO: 0.2 % — SIGNIFICANT CHANGE UP (ref 0–0.9)
INR BLD: 0.86 RATIO — SIGNIFICANT CHANGE UP (ref 0.85–1.18)
LYMPHOCYTES # BLD AUTO: 1.31 K/UL — SIGNIFICANT CHANGE UP (ref 1–3.3)
LYMPHOCYTES # BLD AUTO: 23.1 % — SIGNIFICANT CHANGE UP (ref 13–44)
MAGNESIUM SERPL-MCNC: 2 MG/DL — SIGNIFICANT CHANGE UP (ref 1.6–2.6)
MCHC RBC-ENTMCNC: 28.7 PG — SIGNIFICANT CHANGE UP (ref 27–34)
MCHC RBC-ENTMCNC: 31.8 GM/DL — LOW (ref 32–36)
MCV RBC AUTO: 90.2 FL — SIGNIFICANT CHANGE UP (ref 80–100)
MONOCYTES # BLD AUTO: 0.66 K/UL — SIGNIFICANT CHANGE UP (ref 0–0.9)
MONOCYTES NFR BLD AUTO: 11.6 % — SIGNIFICANT CHANGE UP (ref 2–14)
NEUTROPHILS # BLD AUTO: 3.35 K/UL — SIGNIFICANT CHANGE UP (ref 1.8–7.4)
NEUTROPHILS NFR BLD AUTO: 59.1 % — SIGNIFICANT CHANGE UP (ref 43–77)
NT-PROBNP SERPL-SCNC: 1943 PG/ML — HIGH (ref 0–450)
PLATELET # BLD AUTO: 286 K/UL — SIGNIFICANT CHANGE UP (ref 150–400)
POTASSIUM SERPL-MCNC: 4.6 MMOL/L — SIGNIFICANT CHANGE UP (ref 3.5–5.3)
POTASSIUM SERPL-SCNC: 4.6 MMOL/L — SIGNIFICANT CHANGE UP (ref 3.5–5.3)
PROT SERPL-MCNC: 7.6 GM/DL — SIGNIFICANT CHANGE UP (ref 6–8.3)
PROTHROM AB SERPL-ACNC: 9.8 SEC — SIGNIFICANT CHANGE UP (ref 9.5–13)
RBC # BLD: 3.56 M/UL — LOW (ref 3.8–5.2)
RBC # FLD: 13.7 % — SIGNIFICANT CHANGE UP (ref 10.3–14.5)
SODIUM SERPL-SCNC: 137 MMOL/L — SIGNIFICANT CHANGE UP (ref 135–145)
TROPONIN I, HIGH SENSITIVITY RESULT: 198.02 NG/L — HIGH
WBC # BLD: 5.67 K/UL — SIGNIFICANT CHANGE UP (ref 3.8–10.5)
WBC # FLD AUTO: 5.67 K/UL — SIGNIFICANT CHANGE UP (ref 3.8–10.5)

## 2024-06-11 PROCEDURE — 36415 COLL VENOUS BLD VENIPUNCTURE: CPT

## 2024-06-11 PROCEDURE — 85730 THROMBOPLASTIN TIME PARTIAL: CPT

## 2024-06-11 PROCEDURE — 99285 EMERGENCY DEPT VISIT HI MDM: CPT

## 2024-06-11 PROCEDURE — 85610 PROTHROMBIN TIME: CPT

## 2024-06-11 PROCEDURE — 94640 AIRWAY INHALATION TREATMENT: CPT

## 2024-06-11 PROCEDURE — 93306 TTE W/DOPPLER COMPLETE: CPT

## 2024-06-11 PROCEDURE — 80061 LIPID PANEL: CPT

## 2024-06-11 PROCEDURE — 93010 ELECTROCARDIOGRAM REPORT: CPT

## 2024-06-11 PROCEDURE — 93005 ELECTROCARDIOGRAM TRACING: CPT

## 2024-06-11 PROCEDURE — 83036 HEMOGLOBIN GLYCOSYLATED A1C: CPT

## 2024-06-11 PROCEDURE — 0241U: CPT

## 2024-06-11 PROCEDURE — 84484 ASSAY OF TROPONIN QUANT: CPT

## 2024-06-11 PROCEDURE — 76376 3D RENDER W/INTRP POSTPROCES: CPT

## 2024-06-11 PROCEDURE — 85025 COMPLETE CBC W/AUTO DIFF WBC: CPT

## 2024-06-11 PROCEDURE — 71045 X-RAY EXAM CHEST 1 VIEW: CPT | Mod: 26

## 2024-06-11 PROCEDURE — 80053 COMPREHEN METABOLIC PANEL: CPT

## 2024-06-11 RX ORDER — LEVOTHYROXINE SODIUM 125 MCG
1 TABLET ORAL
Qty: 0 | Refills: 0 | DISCHARGE

## 2024-06-11 RX ORDER — SUCRALFATE 1 G
1 TABLET ORAL
Refills: 0 | DISCHARGE

## 2024-06-11 RX ORDER — ACETAMINOPHEN 500 MG
2 TABLET ORAL
Refills: 0 | DISCHARGE

## 2024-06-11 RX ORDER — METOPROLOL TARTRATE 50 MG
1 TABLET ORAL
Qty: 0 | Refills: 0 | DISCHARGE

## 2024-06-11 RX ORDER — ASPIRIN/CALCIUM CARB/MAGNESIUM 324 MG
2 TABLET ORAL
Refills: 0 | DISCHARGE

## 2024-06-11 RX ORDER — MULTIVIT-MIN/FERROUS GLUCONATE 9 MG/15 ML
2 LIQUID (ML) ORAL
Qty: 0 | Refills: 0 | DISCHARGE

## 2024-06-11 RX ORDER — IPRATROPIUM/ALBUTEROL SULFATE 18-103MCG
3 AEROSOL WITH ADAPTER (GRAM) INHALATION
Refills: 0 | Status: COMPLETED | OUTPATIENT
Start: 2024-06-11 | End: 2024-06-11

## 2024-06-11 RX ORDER — FLUTICASONE PROPIONATE AND SALMETEROL 50; 250 UG/1; UG/1
0 POWDER ORAL; RESPIRATORY (INHALATION)
Refills: 0 | DISCHARGE

## 2024-06-11 RX ORDER — CAPSAICIN 0.025 %
1 CREAM (GRAM) TOPICAL
Refills: 0 | DISCHARGE

## 2024-06-11 RX ORDER — MULTIVIT-MIN/FERROUS GLUCONATE 9 MG/15 ML
1 LIQUID (ML) ORAL
Refills: 0 | DISCHARGE

## 2024-06-11 RX ORDER — LORATADINE 10 MG/1
1 TABLET ORAL
Refills: 0 | DISCHARGE

## 2024-06-11 RX ADMIN — Medication 125 MILLIGRAM(S): at 21:50

## 2024-06-11 RX ADMIN — Medication 3 MILLILITER(S): at 22:14

## 2024-06-11 RX ADMIN — Medication 3 MILLILITER(S): at 22:44

## 2024-06-11 RX ADMIN — Medication 3 MILLILITER(S): at 21:52

## 2024-06-11 NOTE — ED ADULT NURSE NOTE - NSFALLHARMRISKINTERV_ED_ALL_ED

## 2024-06-11 NOTE — ED PROVIDER NOTE - IV ALTEPLASE EXCL REL HIDDEN
Written by Dennis De Anda, as dictated by Dr. Yolette Pal MD. 
 
[address] [facility] L[place]ette Tafoya is a 77 y.o. female. HPI The patient presents today c/o rash on the back of her L lower leg. She notes that the area turned all white. She has noticed similar but smaller rashes on other parts of her body, but was concerned because it was much larger. She has not applied any topical treatment, but has applied OTC hydrocortisone cream to other similar areas. Patient notes that the areas take a while to resolve, even with hydrocortisone cream. 
 
Patient notes she is wearing a Holter monitor today and reports that she has experienced some palpitations. Heart CT performed on 10/17: Total calcium score of 30. Mild evidence of plaque. Patient notes that she has been followed a Mediterranean diet. Patient Active Problem List  
Diagnosis Code  Osteopenia M85.80  Arthritis M19.90  
 Hyperlipidemia E78.5  Mild depression (HCC) F32.0 Current Outpatient Medications on File Prior to Visit Medication Sig Dispense Refill  traZODone (DESYREL) 150 mg tablet TAKE 1 TABLET NIGHTLY (NEED APPOINTMENT FOR ADDITIONAL REFILLS FOR CHRONIC MEDICAL CARE MANAGEMENT) 30 Tab 0  
 atorvastatin (LIPITOR) 10 mg tablet TAKE 1 TABLET DAILY 90 Tab 1  clotrimazole (LOTRIMIN) 1 % topical cream Apply  to affected area two (2) times a day. 15 g 0  
 desonide (DESOWEN) 0.05 % topical ointment Apply  to affected area two (2) times a day. 15 g 0  
 ALPRAZolam (XANAX) 0.5 mg tablet Take 1 Tab by mouth nightly as needed for Anxiety for up to 15 doses. Max Daily Amount: 0.5 mg. 15 Tab 0  
 aspirin delayed-release 81 mg tablet Take 81 mg by mouth daily.  calcium 500 mg Tab Take 2 Tabs by mouth daily.  Cholecalciferol, Vitamin D3, (VITAMIN D) 1,000 unit Cap Take 1 Tab by mouth daily.  fish oil-dha-epa (FISH OIL) 1,200-144-216 mg Cap Take 2 Caps by mouth daily. No current facility-administered medications on file prior to visit. Past Medical History:  
Diagnosis Date  Arthritis  Hypercholesterolemia  Sun-damaged skin  Sunburn, blistering Past Surgical History:  
Procedure Laterality Date  ENDOSCOPY, COLON, DIAGNOSTIC  3/7/2012/  
 repeat in 5 years due family hx  HX GYN    
   HX ORTHOPAEDIC    
 rotator cuff tear Family History Problem Relation Age of Onset  Hypertension Mother  Stroke Mother  Cancer Father   
     leukemia Social History Socioeconomic History  Marital status:  Spouse name: Not on file  Number of children: Not on file  Years of education: Not on file  Highest education level: Not on file Social Needs  Financial resource strain: Not on file  Food insecurity - worry: Not on file  Food insecurity - inability: Not on file  Transportation needs - medical: Not on file  Transportation needs - non-medical: Not on file Occupational History  Not on file Tobacco Use  Smoking status: Never Smoker  Smokeless tobacco: Never Used Substance and Sexual Activity  Alcohol use: Yes Comment: socially  Drug use: No  
 Sexual activity: Yes  
  Partners: Male Other Topics Concern  Not on file Social History Narrative  Not on file Office Visit on 10/11/2018 Component Date Value Ref Range Status  WBC 10/11/2018 4.3  3.4 - 10.8 x10E3/uL Final  
 RBC 10/11/2018 4.63  3.77 - 5.28 x10E6/uL Final  
 HGB 10/11/2018 14.7  11.1 - 15.9 g/dL Final  
 HCT 10/11/2018 43.6  34.0 - 46.6 % Final  
 MCV 10/11/2018 94  79 - 97 fL Final  
 MCH 10/11/2018 31.7  26.6 - 33.0 pg Final  
 MCHC 10/11/2018 33.7  31.5 - 35.7 g/dL Final  
 RDW 10/11/2018 13.2  12.3 - 15.4 % Final  
 PLATELET  228  150 - 379 x10E3/uL Final  
 Glucose 10/11/2018 95  65 - 99 mg/dL Final  
 BUN 10/11/2018 11  8 - 27 mg/dL Final  
  Creatinine 10/11/2018 0.75  0.57 - 1.00 mg/dL Final  
 GFR est non-AA 10/11/2018 83  >59 mL/min/1.73 Final  
 GFR est AA 10/11/2018 96  >59 mL/min/1.73 Final  
 BUN/Creatinine ratio 10/11/2018 15  12 - 28 Final  
 Sodium 10/11/2018 142  134 - 144 mmol/L Final  
 Potassium 10/11/2018 4.2  3.5 - 5.2 mmol/L Final  
 Chloride 10/11/2018 101  96 - 106 mmol/L Final  
 CO2 10/11/2018 24  20 - 29 mmol/L Final  
 Calcium 10/11/2018 9.5  8.7 - 10.3 mg/dL Final  
 Protein, total 10/11/2018 7.3  6.0 - 8.5 g/dL Final  
 Albumin 10/11/2018 4.9* 3.6 - 4.8 g/dL Final  
 GLOBULIN, TOTAL 10/11/2018 2.4  1.5 - 4.5 g/dL Final  
 A-G Ratio 10/11/2018 2.0  1.2 - 2.2 Final  
 Bilirubin, total 10/11/2018 1.8* 0.0 - 1.2 mg/dL Final  
 Alk. phosphatase 10/11/2018 91  39 - 117 IU/L Final  
 AST (SGOT) 10/11/2018 23  0 - 40 IU/L Final  
 ALT (SGPT) 10/11/2018 16  0 - 32 IU/L Final  
 Cholesterol, total 10/11/2018 152  100 - 199 mg/dL Final  
 Triglyceride 10/11/2018 56  0 - 149 mg/dL Final  
 HDL Cholesterol 10/11/2018 63  >39 mg/dL Final  
 VLDL, calculated 10/11/2018 11  5 - 40 mg/dL Final  
 LDL, calculated 10/11/2018 78  0 - 99 mg/dL Final  
 TSH 10/11/2018 1.100  0.450 - 4.500 uIU/mL Final  
 
 
Review of Systems Respiratory: Negative for cough and shortness of breath. Cardiovascular: Positive for palpitations. Negative for chest pain. Musculoskeletal: Negative for joint pain and myalgias. Skin: Positive for rash. Neurological: Negative for dizziness, tingling, sensory change and headaches. Psychiatric/Behavioral: Negative for depression, memory loss and substance abuse. Visit Vitals /84 (BP 1 Location: Right arm, BP Patient Position: Sitting) Pulse 70 Temp 97.8 °F (36.6 °C) (Oral) Resp 16 Ht 5' 2\" (1.575 m) Wt 137 lb 3.2 oz (62.2 kg) SpO2 99% BMI 25.09 kg/m² Physical Exam  
Constitutional: She is oriented to person, place, and time.  She appears well-developed and well-nourished. HENT:  
Right Ear: External ear normal.  
Left Ear: External ear normal.  
Eyes: Conjunctivae and EOM are normal.  
Neck: Normal range of motion. Neck supple. Cardiovascular: Normal rate and regular rhythm. Pulmonary/Chest: Effort normal and breath sounds normal. She has no wheezes. Abdominal: Soft. Bowel sounds are normal.  
Lymphadenopathy:  
  She has no cervical adenopathy. Neurological: She is alert and oriented to person, place, and time. Skin: White, scaly rash on back of L lower leg Psychiatric: She has a normal mood and affect. Her behavior is normal.  
Nursing note and vitals reviewed. ASSESSMENT and PLAN 
  ICD-10-CM ICD-9-CM 1. Eczema of lower leg L30.9 692.9 hydrocortisone (HYTONE) 2.5 % topical cream sent to pharmacy. Hydrocortisone 2.5% cream prescribed, which she should apply to the area in the morning and evening for 5 days. 2. Intermittent palpitations R00.2 785.1 She is wearing the Holter monitor at this time. This plan was reviewed with the patient and patient agrees. All questions were answered. This scribe documentation was reviewed by me and accurately reflects the examination and decisions made by me. This note will not be viewable in [address] show

## 2024-06-11 NOTE — ED ADULT NURSE NOTE - NEURO BEHAVIOR
Is This A New Presentation, Or A Follow-Up?: Skin Lesion What Type Of Note Output Would You Prefer (Optional)?: Standard Output How Severe Is Your Skin Lesion?: mild Has Your Skin Lesion Been Treated?: been treated When Was It Treated?: November 2019 calm

## 2024-06-11 NOTE — ED ADULT NURSE NOTE - CHIEF COMPLAINT QUOTE
Pt BIBEMS from [facility] with c/o cough x 6pm. Pt endorses wheezing and intermittent SOB. PMH Afib and 2 baby asprin. Pt got 2 neb treatments from EMS in route. Pt A+O in triage

## 2024-06-11 NOTE — ED PROVIDER NOTE - SKIN, MLM
This patient is enrolled in the Follow Your Heart program and has undergone a cardiac surgery procedure within the last 30 days and has active care navigation.   This patient can be followed up by the care navigation team within 24 hours. To arrange close follow-up or to obtain additional clinical information about this patient, please call the contact number above.   Please call the cardiac surgery team once patient is registered at [phone] for consultation PRIOR to disposition decision.  The patient recently underwent a cardiac surgery procedure and the team can assist in acute medical management.
Skin normal color for race, warm, dry and intact. No evidence of rash.

## 2024-06-11 NOTE — PHARMACOTHERAPY INTERVENTION NOTE - COMMENTS
Medication reconciliation completed.  Reviewed Medication list and confirmed med allergies with patient; confirmed with Dr. First Medausten.

## 2024-06-11 NOTE — ED PROVIDER NOTE - CLINICAL SUMMARY MEDICAL DECISION MAKING FREE TEXT BOX
89 year old female presents to ED c/o wheezing. Screening EKG, CX, labs and re-eval. 89 year old female presents to ED c/o wheezing. Screening EKG, CXr, labs and re-eval.    Piper DO: endorsed to Dr. Hollingsworth for admission.

## 2024-06-11 NOTE — ED ADULT TRIAGE NOTE - CHIEF COMPLAINT QUOTE
Pt BIBEMS from [facility] with c/o cough x 6pm. Pt endorses wheezing and intermittent SOB. PMH Afib and 2 baby asprin. Pt got 2 neb treatments from EMS in route. Pt A+O in triage

## 2024-06-11 NOTE — ED PROVIDER NOTE - OBJECTIVE STATEMENT
89 year old female with PMHx of asthma, CAD s/p CABG, A-fib (not currently on AC); presents to ED BIBEMS from [facility] c/o intermittent wheezing for the past few months but worsening tonight. Chronic cough. Khalif SOB, fever, chills, chest pain, sick contacts.

## 2024-06-11 NOTE — ED PROVIDER NOTE - PROGRESS NOTE DETAILS
Piper DO: patient with chronic elevated trops; hx of AS; no chest pain; will trend; allergic to asa; still with wheezing after 2nd neb; pending one more neb; will admit; continue to re-eval closely.

## 2024-06-11 NOTE — ED PROVIDER NOTE - ENMT, MLM
Advised pt we do not do PA's for this med he will to self pay Airway patent, Nasal mucosa clear. Mouth with normal mucosa. Throat has no vesicles, no oropharyngeal exudates and uvula is midline.

## 2024-06-11 NOTE — ED ADULT NURSE NOTE - OBJECTIVE STATEMENT
pt. TONIO from [facility] with c/o  Chronic cough for few months accompanied with wheezing and getting worse, alert and oriented, known with Afib, on baby ASA, safety maintained applied.

## 2024-06-12 ENCOUNTER — RESULT REVIEW (OUTPATIENT)
Age: 89
End: 2024-06-12

## 2024-06-12 DIAGNOSIS — I48.20 CHRONIC ATRIAL FIBRILLATION, UNSPECIFIED: ICD-10-CM

## 2024-06-12 DIAGNOSIS — R79.89 OTHER SPECIFIED ABNORMAL FINDINGS OF BLOOD CHEMISTRY: ICD-10-CM

## 2024-06-12 DIAGNOSIS — I35.0 NONRHEUMATIC AORTIC (VALVE) STENOSIS: ICD-10-CM

## 2024-06-12 DIAGNOSIS — J44.1 CHRONIC OBSTRUCTIVE PULMONARY DISEASE WITH (ACUTE) EXACERBATION: ICD-10-CM

## 2024-06-12 LAB
ALBUMIN SERPL ELPH-MCNC: 3.5 G/DL — SIGNIFICANT CHANGE UP (ref 3.3–5)
ALP SERPL-CCNC: 61 U/L — SIGNIFICANT CHANGE UP (ref 40–120)
ALT FLD-CCNC: 17 U/L — SIGNIFICANT CHANGE UP (ref 12–78)
ANION GAP SERPL CALC-SCNC: 6 MMOL/L — SIGNIFICANT CHANGE UP (ref 5–17)
APTT BLD: 27.7 SEC — SIGNIFICANT CHANGE UP (ref 24.5–35.6)
AST SERPL-CCNC: 21 U/L — SIGNIFICANT CHANGE UP (ref 15–37)
BASOPHILS # BLD AUTO: 0.01 K/UL — SIGNIFICANT CHANGE UP (ref 0–0.2)
BASOPHILS NFR BLD AUTO: 0.2 % — SIGNIFICANT CHANGE UP (ref 0–2)
BILIRUB SERPL-MCNC: 0.3 MG/DL — SIGNIFICANT CHANGE UP (ref 0.2–1.2)
BUN SERPL-MCNC: 29 MG/DL — HIGH (ref 7–23)
CALCIUM SERPL-MCNC: 10.4 MG/DL — HIGH (ref 8.5–10.1)
CHLORIDE SERPL-SCNC: 102 MMOL/L — SIGNIFICANT CHANGE UP (ref 96–108)
CHOLEST SERPL-MCNC: 196 MG/DL — SIGNIFICANT CHANGE UP
CO2 SERPL-SCNC: 26 MMOL/L — SIGNIFICANT CHANGE UP (ref 22–31)
CREAT SERPL-MCNC: 0.86 MG/DL — SIGNIFICANT CHANGE UP (ref 0.5–1.3)
EGFR: 64 ML/MIN/1.73M2 — SIGNIFICANT CHANGE UP
EOSINOPHIL # BLD AUTO: 0.01 K/UL — SIGNIFICANT CHANGE UP (ref 0–0.5)
EOSINOPHIL NFR BLD AUTO: 0.2 % — SIGNIFICANT CHANGE UP (ref 0–6)
FLUAV AG NPH QL: SIGNIFICANT CHANGE UP
FLUBV AG NPH QL: SIGNIFICANT CHANGE UP
GLUCOSE SERPL-MCNC: 132 MG/DL — HIGH (ref 70–99)
HCT VFR BLD CALC: 30.8 % — LOW (ref 34.5–45)
HDLC SERPL-MCNC: 69 MG/DL — SIGNIFICANT CHANGE UP
HGB BLD-MCNC: 9.7 G/DL — LOW (ref 11.5–15.5)
IMM GRANULOCYTES NFR BLD AUTO: 0.5 % — SIGNIFICANT CHANGE UP (ref 0–0.9)
INR BLD: 0.92 RATIO — SIGNIFICANT CHANGE UP (ref 0.85–1.18)
LIPID PNL WITH DIRECT LDL SERPL: 112 MG/DL — HIGH
LYMPHOCYTES # BLD AUTO: 0.58 K/UL — LOW (ref 1–3.3)
LYMPHOCYTES # BLD AUTO: 9.1 % — LOW (ref 13–44)
MCHC RBC-ENTMCNC: 28.3 PG — SIGNIFICANT CHANGE UP (ref 27–34)
MCHC RBC-ENTMCNC: 31.5 GM/DL — LOW (ref 32–36)
MCV RBC AUTO: 89.8 FL — SIGNIFICANT CHANGE UP (ref 80–100)
MONOCYTES # BLD AUTO: 0.17 K/UL — SIGNIFICANT CHANGE UP (ref 0–0.9)
MONOCYTES NFR BLD AUTO: 2.7 % — SIGNIFICANT CHANGE UP (ref 2–14)
NEUTROPHILS # BLD AUTO: 5.57 K/UL — SIGNIFICANT CHANGE UP (ref 1.8–7.4)
NEUTROPHILS NFR BLD AUTO: 87.3 % — HIGH (ref 43–77)
NON HDL CHOLESTEROL: 126 MG/DL — SIGNIFICANT CHANGE UP
PLATELET # BLD AUTO: 292 K/UL — SIGNIFICANT CHANGE UP (ref 150–400)
POTASSIUM SERPL-MCNC: 4.2 MMOL/L — SIGNIFICANT CHANGE UP (ref 3.5–5.3)
POTASSIUM SERPL-SCNC: 4.2 MMOL/L — SIGNIFICANT CHANGE UP (ref 3.5–5.3)
PROT SERPL-MCNC: 8.1 GM/DL — SIGNIFICANT CHANGE UP (ref 6–8.3)
PROTHROM AB SERPL-ACNC: 10.4 SEC — SIGNIFICANT CHANGE UP (ref 9.5–13)
RBC # BLD: 3.43 M/UL — LOW (ref 3.8–5.2)
RBC # FLD: 13.7 % — SIGNIFICANT CHANGE UP (ref 10.3–14.5)
RSV RNA NPH QL NAA+NON-PROBE: SIGNIFICANT CHANGE UP
SARS-COV-2 RNA SPEC QL NAA+PROBE: SIGNIFICANT CHANGE UP
SODIUM SERPL-SCNC: 134 MMOL/L — LOW (ref 135–145)
TRIGL SERPL-MCNC: 78 MG/DL — SIGNIFICANT CHANGE UP
TROPONIN I, HIGH SENSITIVITY RESULT: 183.9 NG/L — HIGH
TROPONIN I, HIGH SENSITIVITY RESULT: 207.82 NG/L — HIGH
WBC # BLD: 6.37 K/UL — SIGNIFICANT CHANGE UP (ref 3.8–10.5)
WBC # FLD AUTO: 6.37 K/UL — SIGNIFICANT CHANGE UP (ref 3.8–10.5)

## 2024-06-12 PROCEDURE — 93306 TTE W/DOPPLER COMPLETE: CPT | Mod: 26

## 2024-06-12 PROCEDURE — 99223 1ST HOSP IP/OBS HIGH 75: CPT

## 2024-06-12 PROCEDURE — 76376 3D RENDER W/INTRP POSTPROCES: CPT | Mod: 26

## 2024-06-12 PROCEDURE — 93010 ELECTROCARDIOGRAM REPORT: CPT

## 2024-06-12 RX ORDER — ATORVASTATIN CALCIUM 80 MG/1
10 TABLET, FILM COATED ORAL AT BEDTIME
Refills: 0 | Status: DISCONTINUED | OUTPATIENT
Start: 2024-06-12 | End: 2024-06-13

## 2024-06-12 RX ORDER — ACETAMINOPHEN 500 MG
650 TABLET ORAL EVERY 6 HOURS
Refills: 0 | Status: DISCONTINUED | OUTPATIENT
Start: 2024-06-12 | End: 2024-06-13

## 2024-06-12 RX ORDER — MULTIVIT-MIN/FERROUS GLUCONATE 9 MG/15 ML
1 LIQUID (ML) ORAL DAILY
Refills: 0 | Status: DISCONTINUED | OUTPATIENT
Start: 2024-06-12 | End: 2024-06-13

## 2024-06-12 RX ORDER — SUCRALFATE 1 G
1 TABLET ORAL EVERY 12 HOURS
Refills: 0 | Status: DISCONTINUED | OUTPATIENT
Start: 2024-06-12 | End: 2024-06-13

## 2024-06-12 RX ORDER — AZITHROMYCIN 500 MG/1
500 TABLET, FILM COATED ORAL DAILY
Refills: 0 | Status: DISCONTINUED | OUTPATIENT
Start: 2024-06-12 | End: 2024-06-13

## 2024-06-12 RX ORDER — MULTIVIT-MIN/FERROUS GLUCONATE 9 MG/15 ML
1 LIQUID (ML) ORAL DAILY
Refills: 0 | Status: DISCONTINUED | OUTPATIENT
Start: 2024-06-12 | End: 2024-06-12

## 2024-06-12 RX ORDER — LEVOTHYROXINE SODIUM 125 MCG
100 TABLET ORAL DAILY
Refills: 0 | Status: DISCONTINUED | OUTPATIENT
Start: 2024-06-12 | End: 2024-06-13

## 2024-06-12 RX ORDER — ASPIRIN/CALCIUM CARB/MAGNESIUM 324 MG
162 TABLET ORAL DAILY
Refills: 0 | Status: DISCONTINUED | OUTPATIENT
Start: 2024-06-12 | End: 2024-06-12

## 2024-06-12 RX ORDER — METOPROLOL TARTRATE 50 MG
25 TABLET ORAL DAILY
Refills: 0 | Status: DISCONTINUED | OUTPATIENT
Start: 2024-06-12 | End: 2024-06-13

## 2024-06-12 RX ORDER — LORATADINE 10 MG/1
10 TABLET ORAL DAILY
Refills: 0 | Status: DISCONTINUED | OUTPATIENT
Start: 2024-06-12 | End: 2024-06-13

## 2024-06-12 RX ORDER — ASPIRIN/CALCIUM CARB/MAGNESIUM 324 MG
162 TABLET ORAL DAILY
Refills: 0 | Status: DISCONTINUED | OUTPATIENT
Start: 2024-06-12 | End: 2024-06-13

## 2024-06-12 RX ORDER — IPRATROPIUM/ALBUTEROL SULFATE 18-103MCG
3 AEROSOL WITH ADAPTER (GRAM) INHALATION EVERY 6 HOURS
Refills: 0 | Status: DISCONTINUED | OUTPATIENT
Start: 2024-06-12 | End: 2024-06-13

## 2024-06-12 RX ADMIN — Medication 1 DROP(S): at 22:15

## 2024-06-12 RX ADMIN — Medication 162 MILLIGRAM(S): at 07:32

## 2024-06-12 RX ADMIN — Medication 25 MILLIGRAM(S): at 15:07

## 2024-06-12 RX ADMIN — Medication 2 TABLET(S): at 15:01

## 2024-06-12 RX ADMIN — Medication 3 MILLILITER(S): at 20:23

## 2024-06-12 RX ADMIN — Medication 1 GRAM(S): at 13:18

## 2024-06-12 RX ADMIN — Medication 10 MILLIGRAM(S): at 15:06

## 2024-06-12 RX ADMIN — ATORVASTATIN CALCIUM 10 MILLIGRAM(S): 80 TABLET, FILM COATED ORAL at 22:15

## 2024-06-12 RX ADMIN — Medication 1 TABLET(S): at 15:06

## 2024-06-12 RX ADMIN — AZITHROMYCIN 500 MILLIGRAM(S): 500 TABLET, FILM COATED ORAL at 06:47

## 2024-06-12 RX ADMIN — Medication 100 MICROGRAM(S): at 07:32

## 2024-06-12 RX ADMIN — Medication 3 MILLILITER(S): at 14:51

## 2024-06-12 NOTE — H&P ADULT - ASSESSMENT
89 y/o F w/ PMH of asthma, CAD s/p CABG, paroxysmal a-fib (not on AC 2/2 fall risk & prior ICH), TIA, severe aortic stenosis, mitral valve repair, RA, p/w wheezing    *Elevated troponins w/ h/o CAD s/p CABG   -ASA  -Trend troponins  -Cardio consult  -Tele monitoring  -Echo  -EKG     *COPD exacerbation w/ possible URI  -Duonebs  -Steroids  -Pulse ox monitoring  -Levofloxacin  -F/u final CXR     *Severe AS  -F/u cardio     *H/o CAD / paroxysmal a-fib (not on AC 2/2 ICH) / TIA / RA  -C/w home meds and f/u outpatient for further management if conditions remain stable during hospitalization     *DVT ppx   -SCDs 2/2 h/o ICH     >75 mins required for admission    89 y/o F w/ PMH of asthma, CAD s/p CABG, paroxysmal a-fib (not on AC 2/2 fall risk & prior ICH), TIA, severe aortic stenosis, mitral valve repair, RA, p/w wheezing    *Elevated troponins w/ h/o CAD s/p CABG   -ASA  -Trend troponins  -Cardio consult  -Tele monitoring  -Echo  -EKG: Sinus 70 bpm, 1st deg AV block     *COPD exacerbation w/ possible URI  -Duonebs  -Steroids  -Pulse ox monitoring  -Zithromax   -F/u final CXR     *Severe AS  -F/u cardio     *H/o CAD / paroxysmal a-fib (not on AC 2/2 ICH) / TIA / RA  -C/w home meds and f/u outpatient for further management if conditions remain stable during hospitalization     *DVT ppx   -SCDs 2/2 h/o ICH     >75 mins required for admission

## 2024-06-12 NOTE — CONSULT NOTE ADULT - PROBLEM SELECTOR RECOMMENDATION 2
known severe AS on TTE and LHC from 11/23. refuses any invasive procedures, elevated pro BNP 1943  Recommendation: TTE, medical management with BB and home dose diuretic - torsemide 20 mg po daily known severe AS on TTE and LHC from 11/23, refuses any invasive procedures, elevated pro BNP 1943  Recommendation: TTE, medical management with BB and home dose diuretic - torsemide 20 mg po daily

## 2024-06-12 NOTE — ED ADULT NURSE REASSESSMENT NOTE - NS ED NURSE REASSESS COMMENT FT1
Received pt, A/Ox3, forgetful, VSS. Pt with no c/o at this time. On room air, nonproductive cough noted. Discussed plan of care, awaiting bed upstairs.

## 2024-06-12 NOTE — H&P ADULT - HISTORY OF PRESENT ILLNESS
89 y/o F w/ PMH of asthma, CAD s/p CABG, paroxysmal a-fib (not on AC 2/2 fall risk & prior ICH), TIA, severe aortic stenosis, mitral valve repair, RA, p/w wheezing. Patient states she has been wheezing for a couple of months, but it recently got worse. Also c/o coughing for a couple of years, but states it also got worse and it associated with hoarseness in voice. Denies fever, chills, nausea, vomiting, abdominal pain, CP.       PSH: mitral valve repair    Social Hx: Denies tobacco / etoh / drugs     Family Hx: Denies pertinent Hx

## 2024-06-12 NOTE — PATIENT PROFILE ADULT - FUNCTIONAL ASSESSMENT - DAILY ACTIVITY 5.
2 = A lot of assistance Topical Sulfur Applications Counseling: Topical Sulfur Counseling: Patient counseled that this medication may cause skin irritation or allergic reactions.  In the event of skin irritation, the patient was advised to reduce the amount of the drug applied or use it less frequently.   The patient verbalized understanding of the proper use and possible adverse effects of topical sulfur application.  All of the patient's questions and concerns were addressed.

## 2024-06-12 NOTE — CONSULT NOTE ADULT - SUBJECTIVE AND OBJECTIVE BOX
CHIEF COMPLAINT: Patient is a 89y old  Female who presents with a chief complaint of wheezing (12 Jun 2024 06:21)      HPI:  89 y/o F w/ PMH of asthma, CAD s/p CABG, paroxysmal a-fib (not on AC 2/2 fall risk & prior ICH), TIA, severe aortic stenosis, mitral valve repair, RA, p/w wheezing. Patient states she has been wheezing for a couple of months, but it recently got worse. Also c/o coughing for a couple of years, but states it also got worse and it associated with hoarseness in voice. Denies fever, chills, nausea, vomiting, abdominal pain, CP.       Cardiology consulted for elevated trops and severe AS. Pt. was previously seen on 11/23 and had LHC with non obstructive CAD and TTE showing severe AS. Pt. was supposed to fu with Structural Heart for TAVR.   Pt. lives at [facility] at present, admitted for worsening wheezing,likely due to COPD exacerb.  D/W pt. - she did not  followup with Structural heart since last admission and she does not want any invasive procedure - including TAVR/LHC. She states she has a poor quality of life due to RA, unable to ambulate and wants to sign DNR./DNI. She is a&o x 4 and understands the severity of her condition.     PAST MEDICAL & SURGICAL HISTORY:  RA  TIA  ICH  Severe AS  Asthma  Sacral wounds   CAD (coronary artery disease)  CABG  MVR    SOCIAL HISTORY:   Alcohol: Denied  Smoking: Nonsmoker  Drug Use: Denied  Marital Status:     FAMILY HISTORY: non contributory       MEDICATIONS  (STANDING):  albuterol/ipratropium for Nebulization 3 milliLiter(s) Nebulizer every 6 hours  aspirin enteric coated 162 milliGRAM(s) Oral daily  atorvastatin 10 milliGRAM(s) Oral at bedtime  azithromycin   Tablet 500 milliGRAM(s) Oral daily  calcium carbonate 1250 mG  + Vitamin D (OsCal 500 + D) 2 Tablet(s) Oral daily  clotrimazole 1% Cream 1 Application(s) Topical every 12 hours  levothyroxine 100 MICROGram(s) Oral daily  metoprolol succinate ER 25 milliGRAM(s) Oral daily  multivitamin/minerals 1 Tablet(s) Oral daily  sucralfate 1 Gram(s) Oral every 12 hours  torsemide 20 milliGRAM(s) Oral daily    MEDICATIONS  (PRN):  acetaminophen     Tablet .. 650 milliGRAM(s) Oral every 6 hours PRN Mild Pain (1 - 3)  artificial  tears Solution 1 Drop(s) Both EYES four times a day PRN Dry Eyes  loratadine 10 milliGRAM(s) Oral daily PRN for allergy symptoms      Allergies    cinnamon (Other)  Polysporin (Unknown)  Prilosec (Unknown)  nitrofurantoin (Unknown)  Flomax (Unknown)  Detrol (Unknown)  bacitracin (Unknown)  albuterol (Unknown)  clindamycin (Unknown)  famotidine (Unknown)  tetracycline (Unknown)  gabapentin (Unknown)  aspirin (Unknown)  Augmentin (Unknown)  Pepcid (Unknown)  hayfever (Unknown)  tramadol (Unknown)  ciprofloxacin (Unknown)    IREVIEW OF SYSTEMS:  CONSTITUTIONAL: No weakness, fevers or chills  Eyes: No visual changes  NECK: No pain or stiffness  RESPIRATORY: + wheezing   CARDIOVASCULAR: No chest pain or palpitations  GASTROINTESTINAL: No abdominal pain. No nausea, vomiting, or hematemesis; No diarrhea or constipation. No melena or hematochezia.  GENITOURINARY: No dysuria, frequency or hematuria  NEUROLOGICAL: No numbness.  SKIN: No itching or rash  All other review of systems is negative unless indicated above    Vital Signs Last 24 Hrs  T(C): 36.3 (12 Jun 2024 08:34), Max: 36.9 (12 Jun 2024 05:46)  T(F): 97.3 (12 Jun 2024 08:34), Max: 98.4 (12 Jun 2024 05:46)  HR: 69 (12 Jun 2024 08:34) (69 - 83)  BP: 101/88 (12 Jun 2024 08:34) (101/88 - 139/77)  BP(mean): 91 (12 Jun 2024 07:37) (75 - 95)  RR: 19 (12 Jun 2024 08:34) (16 - 19)  SpO2: 98% (12 Jun 2024 08:34) (94% - 100%)    Parameters below as of 12 Jun 2024 08:34  Patient On (Oxygen Delivery Method): room air    PHYSICAL EXAM:  Constitutional: NAD, awake and alert  HEENT:  EOMI,  Pupils round, No oral cyanosis.  Pulmonary: Non-labored, breath sounds are clear bilaterally, No wheezing, rales or rhonchi  Cardiovascular: S1 and S2, regular rate and rhythm, no Murmurs, gallops or rubs  Gastrointestinal: Bowel Sounds present, soft, nontender.   Lymph: No peripheral edema. No cervical lymphadenopathy.  Neurological: Alert, no focal deficits  Skin: No rashes.  Psych:  Mood & affect appropriate    LABS: reviewed                         10.2   5.67  )-----------( 286      ( 11 Jun 2024 21:44 )             32.1     12 Jun 2024 09:08    134    |  102    |  29     ----------------------------<  132    4.2     |  26     |  0.86   11 Jun 2024 21:44    137    |  105    |  34     ----------------------------<  123    4.6     |  30     |  0.88     Ca    10.4       12 Jun 2024 09:08  Ca    10.1       11 Jun 2024 21:44  Mg     2.0       11 Jun 2024 21:44    TPro  8.1    /  Alb  3.5    /  TBili  0.3    /  DBili  x      /  AST  21     /  ALT  17     /  AlkPhos  61     12 Jun 2024 09:08  TPro  7.6    /  Alb  3.5    /  TBili  0.2    /  DBili  x      /  AST  15     /  ALT  21     /  AlkPhos  58     11 Jun 2024 21:44    PT/INR - ( 11 Jun 2024 21:44 )   PT: 9.8 sec;   INR: 0.86 ratio         PTT - ( 11 Jun 2024 21:44 )  PTT:27.9 sec    Radiology/EKG/TTE/LHC: reviewed     < from: 12 Lead ECG (06.11.24 @ 21:35) >    Diagnosis Line Sinus rhythm [id number] degree A-V block  Left ventricular hypertrophy with QRS widening ( R in aVL , Kael product )  Inferior infarct , age undetermined  Abnormal ECG  When compared with ECG of 15-MAR-2023 05:26,  Inferior infarct is now Present  Nonspecific T wave abnormality now evident in Lateral leads  Confirmed by MD TALAVERA PAUL ([id number]) on 6/12/2024 10:39:36 AM    < end of copied text >      -----------------------------------------------------------------------------------------------------    < from: TTE Echo Complete w/o Contrast w/ Doppler (11.01.22 @ 08:21) >     Summary     Significant fibrocalcific changes noted to the aortic valve leaflets with   restriction in leaflet excursion. Calculated LAURA is .85cm2 ; this finding   is consistent with severe aortic stenosis.   The left atrium is moderately dilated.   Mild concentric left ventricular hypertrophy is present.   Moderately reduced left ventricular systolic function with inferior wall   akinesis.   Estimated left ventricular ejection fraction is 40-45 %.   The IVC appears normal.   Moderate (2+) mitral regurgitation is present.   Mild mitral annular calcification is present.   Fibrocalcific changes noted to the mitral valve repair-posterior leaflet   with reduced leaflet excursion.   No evidence of pericardial effusion.   No evidence of pleural effusion.  Normal appearing pulmonic valve structure and function.   Normal appearing right atrium.   Normal appearing right ventricle structure and function.   Mild (1+) tricuspid valve regurgitation is present.     Signature     ----------------------------------------------------------------   Electronically signed by Radha Romero MD(Interpreting   physician) on 11/01/2022 10:05 AM   ----------------------------------------------------------------    < end of copied text >      < from: Cardiac Catheterization (11.02.22 @ 15:04) >   Impression     Diagnostic Conclusions     Non-obstructive coronary artery disease. EF 45% with LVEDP 9. LV-Ao   gradient 5.     Recommendations     Recommend aggressive medical management for non-obstructive CAD. Patient   to follow up in structural heart office at [facility] regarding   further investigation of low gradient aortic stenos    < end of copied text >       CHIEF COMPLAINT: Patient is a 89y old  Female who presents with a chief complaint of wheezing (12 Jun 2024 06:21)      HPI:  89 y/o F w/ PMH of asthma, CAD s/p CABG, paroxysmal a-fib (not on AC 2/2 fall risk & prior ICH), TIA, severe aortic stenosis, mitral valve repair, RA, p/w wheezing. Patient states she has been wheezing for a couple of months, but it recently got worse. Also c/o coughing for a couple of years, but states it also got worse and it associated with hoarseness in voice. Denies fever, chills, nausea, vomiting, abdominal pain, CP.       Cardiology consulted for elevated trops and severe AS. Pt. was previously seen on 11/23 and had LHC with non obstructive CAD and TTE showing severe AS. Pt. was supposed to fu with Structural Heart for TAVR.   Pt. lives at [facility] at present, admitted for worsening wheezing,likely due to COPD exacerb.  D/W pt. - she did not  followup with Structural heart since last admission and she does not want any invasive procedure - including TAVR/LHC. She states she has a poor quality of life due to RA, unable to ambulate and wants to sign DNR./DNI. She is a&o x 4 and understands the severity of her condition.     PAST MEDICAL & SURGICAL HISTORY:  RA  TIA  ICH  Severe AS  Asthma  Sacral wounds   CAD (coronary artery disease)  CABG  MVR    SOCIAL HISTORY:   Alcohol: Denied  Smoking: Nonsmoker  Drug Use: Denied  Marital Status:     FAMILY HISTORY: non contributory       MEDICATIONS  (STANDING):  albuterol/ipratropium for Nebulization 3 milliLiter(s) Nebulizer every 6 hours  prednisone 40 mg po daily  aspirin enteric coated 162 milliGRAM(s) Oral daily  atorvastatin 10 milliGRAM(s) Oral at bedtime  azithromycin   Tablet 500 milliGRAM(s) Oral daily  calcium carbonate 1250 mG  + Vitamin D (OsCal 500 + D) 2 Tablet(s) Oral daily  clotrimazole 1% Cream 1 Application(s) Topical every 12 hours  levothyroxine 100 MICROGram(s) Oral daily  metoprolol succinate ER 25 milliGRAM(s) Oral daily  multivitamin/minerals 1 Tablet(s) Oral daily  sucralfate 1 Gram(s) Oral every 12 hours  torsemide 20 milliGRAM(s) Oral daily      MEDICATIONS  (PRN):  acetaminophen     Tablet .. 650 milliGRAM(s) Oral every 6 hours PRN Mild Pain (1 - 3)  artificial  tears Solution 1 Drop(s) Both EYES four times a day PRN Dry Eyes  loratadine 10 milliGRAM(s) Oral daily PRN for allergy symptoms      Allergies    cinnamon (Other)  Polysporin (Unknown)  Prilosec (Unknown)  nitrofurantoin (Unknown)  Flomax (Unknown)  Detrol (Unknown)  bacitracin (Unknown)  albuterol (Unknown)  clindamycin (Unknown)  famotidine (Unknown)  tetracycline (Unknown)  gabapentin (Unknown)  aspirin (Unknown)  Augmentin (Unknown)  Pepcid (Unknown)  hayfever (Unknown)  tramadol (Unknown)  ciprofloxacin (Unknown)    IREVIEW OF SYSTEMS:  CONSTITUTIONAL: No weakness, fevers or chills  Eyes: No visual changes  NECK: No pain or stiffness  RESPIRATORY: + wheezing   CARDIOVASCULAR: No chest pain or palpitations  GASTROINTESTINAL: No abdominal pain. No nausea, vomiting, or hematemesis; No diarrhea or constipation. No melena or hematochezia.  GENITOURINARY: No dysuria, frequency or hematuria  NEUROLOGICAL: No numbness.  SKIN: No itching or rash  All other review of systems is negative unless indicated above    Vital Signs Last 24 Hrs  T(C): 36.3 (12 Jun 2024 08:34), Max: 36.9 (12 Jun 2024 05:46)  T(F): 97.3 (12 Jun 2024 08:34), Max: 98.4 (12 Jun 2024 05:46)  HR: 69 (12 Jun 2024 08:34) (69 - 83)  BP: 101/88 (12 Jun 2024 08:34) (101/88 - 139/77)  BP(mean): 91 (12 Jun 2024 07:37) (75 - 95)  RR: 19 (12 Jun 2024 08:34) (16 - 19)  SpO2: 98% (12 Jun 2024 08:34) (94% - 100%)    Parameters below as of 12 Jun 2024 08:34  Patient On (Oxygen Delivery Method): room air    PHYSICAL EXAM:  Constitutional: NAD, awake and alert  HEENT:  EOMI,  Pupils round, No oral cyanosis.  Pulmonary: B/L wheezes   Cardiovascular: S1 and S2, regular rate and rhythm, III/VI systolic murmur  Gastrointestinal: Bowel Sounds present, soft, nontender.   Lymph: No peripheral edema. No cervical lymphadenopathy.  Neurological: Alert, no focal deficits  Skin: No rashes.  Psych:  Mood & affect appropriate    LABS: reviewed                         10.2   5.67  )-----------( 286      ( 11 Jun 2024 21:44 )             32.1     12 Jun 2024 09:08    134    |  102    |  29     ----------------------------<  132    4.2     |  26     |  0.86   11 Jun 2024 21:44    137    |  105    |  34     ----------------------------<  123    4.6     |  30     |  0.88     Ca    10.4       12 Jun 2024 09:08  Ca    10.1       11 Jun 2024 21:44  Mg     2.0       11 Jun 2024 21:44    TPro  8.1    /  Alb  3.5    /  TBili  0.3    /  DBili  x      /  AST  21     /  ALT  17     /  AlkPhos  61     12 Jun 2024 09:08  TPro  7.6    /  Alb  3.5    /  TBili  0.2    /  DBili  x      /  AST  15     /  ALT  21     /  AlkPhos  58     11 Jun 2024 21:44    PT/INR - ( 11 Jun 2024 21:44 )   PT: 9.8 sec;   INR: 0.86 ratio         PTT - ( 11 Jun 2024 21:44 )  PTT:27.9 sec    Radiology/EKG/TTE/LHC: reviewed     < from: 12 Lead ECG (06.11.24 @ 21:35) >    Diagnosis Line Sinus rhythm [id number] degree A-V block  Left ventricular hypertrophy with QRS widening ( R in aVL , [place] product )  Inferior infarct , age undetermined  Abnormal ECG  When compared with ECG of 15-MAR-2023 05:26,  Inferior infarct is now Present  Nonspecific T wave abnormality now evident in Lateral leads  Confirmed by MD ILAN, JULIAN ([id number]) on 6/12/2024 10:39:36 AM    < end of copied text >      -----------------------------------------------------------------------------------------------------    < from: TTE Echo Complete w/o Contrast w/ Doppler (11.01.22 @ 08:21) >     Summary     Significant fibrocalcific changes noted to the aortic valve leaflets with   restriction in leaflet excursion. Calculated LAURA is .85cm2 ; this finding   is consistent with severe aortic stenosis.   The left atrium is moderately dilated.   Mild concentric left ventricular hypertrophy is present.   Moderately reduced left ventricular systolic function with inferior wall   akinesis.   Estimated left ventricular ejection fraction is 40-45 %.   The IVC appears normal.   Moderate (2+) mitral regurgitation is present.   Mild mitral annular calcification is present.   Fibrocalcific changes noted to the mitral valve repair-posterior leaflet   with reduced leaflet excursion.   No evidence of pericardial effusion.   No evidence of pleural effusion.  Normal appearing pulmonic valve structure and function.   Normal appearing right atrium.   Normal appearing right ventricle structure and function.   Mild (1+) tricuspid valve regurgitation is present.     Signature     ----------------------------------------------------------------   Electronically signed by Radha Romero MD(Interpreting   physician) on 11/01/2022 10:05 AM   ----------------------------------------------------------------    < end of copied text >      < from: Cardiac Catheterization (11.02.22 @ 15:04) >   Impression     Diagnostic Conclusions     Non-obstructive coronary artery disease. EF 45% with LVEDP 9. LV-Ao   gradient 5.     Recommendations     Recommend aggressive medical management for non-obstructive CAD. Patient   to follow up in structural heart office at [facility] regarding   further investigation of low gradient aortic stenos    < end of copied text >

## 2024-06-12 NOTE — CONSULT NOTE ADULT - PROBLEM SELECTOR RECOMMENDATION 3
pt. with chronic Afib now in NSR 80s with PACs, not on AC due to Falls, Hx of ICH  Recommendation: monitor on tele, takes 2 baby AS (162 mg po daily as no AC), cont. BB at home dose - monitor for hypotension pt. with chronic Afib now in NSR 80s with PACs, not on AC due to Falls, Hx of ICH  Recommendation: monitor on tele, takes 2 baby ASA (162 mg po daily as no AC), cont. BB at home dose - monitor for hypotension

## 2024-06-12 NOTE — PATIENT PROFILE ADULT - FALL HARM RISK - HARM RISK INTERVENTIONS

## 2024-06-12 NOTE — H&P ADULT - RESPIRATORY
no rales/no rhonchi/no respiratory distress/airway patent/breath sounds equal/good air movement/wheezes

## 2024-06-12 NOTE — PATIENT PROFILE ADULT - CAREGIVER ADDRESS
Cerumen removed from both ears today  Ear exam is normal after the cerumen was removed.    Please keep your ears dry, avoid using Q-tips or placing anything in the ear canal to clean your ears.      Use Flonase 2 sprays in each nostril once daily    LARYNGOPHARYNGEAL REFLUX (LPR)    Gastroesophageal reflux (GERD) is the term for frequent abnormal backflow of stomach acid and irritating digestive juices up into the esophagus (at the food passage between the throat and stomach). Many people experience GERD as a simple occasional heartburn, but some people suffer more severe indigestion, with erosive inflammation of the esophageal lining. Nighttime is often worse than during the day.    A type of silent reflux, called laryngopharyngeal reflux (LPR), causes different symptoms. This kind of reflux happens less frequently and more quickly, but goes much higher into the throat, where the lining is much more sensitive. It usually involves little bits of acid, just a few drops at a time. It can happen day or night, but more typically during the day. Some people experience a sore throat, hoarseness, chronic cough, and even asthma. A lump sensation in the throat, or feeling of postnasal drip, phlegm or sour taste in the throat is typical with LPR. There is often a frustrating feeling of needing to constantly clear the throat. Heartburn symptoms are often absent in LPR, because the reflux happens too quickly for the esophagus to feel it. (Thus the term \"silent\") LPR is difficult to diagnose. The only reliable test for LPR is a somewhat invasive and cumbersome 24-hour monitor, involving a long flexible tube placed in the nose and swallowed into the stomach. Even this test might not detect LPR in 20% of patients.    If you have reflux, you can sometimes control the problem by controlling lifestyle and diet. Use as many of the following suggestions as needed to get relief:  · AVOID fatty foods, chocolate, mints and alcohol,  especially in the evening. All of these have been shown to increase reflux in many people.  · AVOID cola, beer, and milk. They may increase acid production.  · AVOID eating within 3 hours of bedtime, and do not lie down just after eating. Nighttime refluxers often benefit by sleeping with the head elevated on several extra pillows. This uses gravity to help make reflux less likely at night.  · IF YOU SMOKE, QUIT. Tobacco irritates the throat and can also cause more reflux.  · IF YOU ARE OVERWEIGHT, LOSE WEIGHT.  Clothing that fits tightly across the midsection of the body should be avoided.    If possible, AVOID CERTAIN MEDICATIONS, which can increase reflux:  1. Birth control pills  2. Heart and blood pressure medications containing nitrates or calcium channel blockers  3. Asthma medication containing theophylline (rarely used nowadays)  4. DO NOT stop prescription medication without the consent of your primary physician    Taking over-the-counter medication which blocks the stomach acid production can be helpful. The best one available is Prilosec OTC.  Laryngopharyngeal reflux is treated with twice a day dosing, unlike GERD which is typically only treated once a day. The two doses are best taken on an empty stomach, one hour before the morning and evening meals. Prescription medications similar to Prilosec are also available. Keep in mind that it may take 2 or 3 months using medication daily to get good relief of throat symptoms. Other less potent but somewhat helpful over-the-counter medications include Zantac (two 75 mg tablets twice daily, or one 150 mg tablet twice daily), and Pepcid AC (two tablets twice daily). All these medications are generally safe, but if you have questions or concerns regarding interaction with other medications you now take, ask your primary physician and pharmacist. If medication is helpful controlling your reflux, consider using it daily for up to 6 months, then taper off the  medication and start taking it again in the future if reflux symptoms recur.     [address], [place]

## 2024-06-12 NOTE — ED ADULT NURSE REASSESSMENT NOTE - NS ED NURSE REASSESS COMMENT FT1
Patient seen at bedside for winifred light. NAD. Patient changed and patience area cleaned, dry. Purewick remains in place. MD at bedside. Family called ED; updated on plan of care. Patient VSS stable, no current complaints at this time. NSR on cardiac monitor. Safety/fall precautions in place. Care continues as ordered. Awaiting bed assignment to tele.

## 2024-06-12 NOTE — CONSULT NOTE ADULT - PROBLEM SELECTOR RECOMMENDATION 9
pt. with mild trop elevation: 198->207->183, without anginal symptoms, with abnormal EKG - LVH with QRS widening [place] product - new compared with EKG from 11/23.   .  pt, with  known severe AS - refuses any invasive procedures, had LHC on 11/23 - non obstructive CAD, TTE from 11/23 with reduced LVEF 40-45%, inferior wall akinesis, severe AS, moderate MR  Recommendation: repeat EKG, check TTE, medical management, asa - takes 1 baby asa at home as no AC, statin, BB, no ACEI/ARB due to hypotension . mild trop elevation: 198->207->183, without anginal symptoms, with abnormal EKG - LVH with QRS widening [place] product - new compared with EKG from 11/23.   .  pt, with  known severe AS - refuses any invasive procedures, had LHC on 11/23 - non obstructive CAD, TTE from 11/23 with reduced LVEF 40-45%, inferior wall akinesis, severe AS, moderate MR (had MVR more than 10 years ago)  Recommendation: repeat EKG, check TTE, medical management, asa - takes 2 baby asa at home as no AC, statin, BB, no ACEI/ARB due to hypotension

## 2024-06-12 NOTE — ED ADULT NURSE REASSESSMENT NOTE - NS ED NURSE REASSESS COMMENT FT1
Patient seen and assessed in ED. Patient resting in stretcher; NAD; A+Ox4. Patient profile completed. No complaints of acute pain at this time, c/o chronic rheumatoid arthritis pain. Purewick in place; urine is clear yellow. All questions answered. Patient updated on current plan of care. VSS. Awaiting bed assignment. Care continues as ordered.

## 2024-06-12 NOTE — CHART NOTE - NSCHARTNOTEFT_GEN_A_CORE
Patient was seen and examined by me at bedside. I agree with H and P note, subjective, objective physical exam, assessment and plan with following modifications/additions.     I don't believe this is a CHF exacerbation patient appears to be fairly euvolemic on my assessment.    Chest xray unrevealing for exacerbation    Possible asthma exacerbation continue prednisone 40 mg for 5 days total.  Will likely be discharged tomorrow.

## 2024-06-12 NOTE — PATIENT PROFILE ADULT - FUNCTIONAL ASSESSMENT - BASIC MOBILITY 6.
2-calculated by average/Not able to assess (calculate score using [facility] averaging method)

## 2024-06-12 NOTE — CONSULT NOTE ADULT - PROBLEM SELECTOR RECOMMENDATION 4
pt. with B/L wheezes, likely 2/2 COPD exacerbation, on steroids and inhalers, management as per Hospitalist

## 2024-06-13 ENCOUNTER — TRANSCRIPTION ENCOUNTER (OUTPATIENT)
Age: 89
End: 2024-06-13

## 2024-06-13 VITALS
SYSTOLIC BLOOD PRESSURE: 95 MMHG | OXYGEN SATURATION: 97 % | TEMPERATURE: 97 F | RESPIRATION RATE: 17 BRPM | DIASTOLIC BLOOD PRESSURE: 55 MMHG | HEART RATE: 66 BPM

## 2024-06-13 LAB
A1C WITH ESTIMATED AVERAGE GLUCOSE RESULT: 5.4 % — SIGNIFICANT CHANGE UP (ref 4–5.6)
ESTIMATED AVERAGE GLUCOSE: 108 MG/DL — SIGNIFICANT CHANGE UP (ref 68–114)

## 2024-06-13 PROCEDURE — 99239 HOSP IP/OBS DSCHRG MGMT >30: CPT

## 2024-06-13 PROCEDURE — 99232 SBSQ HOSP IP/OBS MODERATE 35: CPT

## 2024-06-13 RX ADMIN — Medication 100 MICROGRAM(S): at 06:44

## 2024-06-13 RX ADMIN — Medication 25 MILLIGRAM(S): at 10:42

## 2024-06-13 RX ADMIN — Medication 3 MILLILITER(S): at 08:14

## 2024-06-13 RX ADMIN — Medication 2 TABLET(S): at 10:42

## 2024-06-13 RX ADMIN — Medication 1 APPLICATION(S): at 06:44

## 2024-06-13 RX ADMIN — Medication 3 MILLILITER(S): at 01:04

## 2024-06-13 RX ADMIN — Medication 1 DROP(S): at 10:45

## 2024-06-13 RX ADMIN — Medication 40 MILLIGRAM(S): at 10:43

## 2024-06-13 RX ADMIN — Medication 1 TABLET(S): at 10:43

## 2024-06-13 NOTE — DISCHARGE NOTE NURSING/CASE MANAGEMENT/SOCIAL WORK - NSDCPEFALRISK_GEN_ALL_CORE
For information on Fall & Injury Prevention, visit: [email or web address] OR  [email or web address] OR  https://www.cdc.gov/steadi/patient.html

## 2024-06-13 NOTE — DISCHARGE NOTE NURSING/CASE MANAGEMENT/SOCIAL WORK - NSDCFUADDAPPT_GEN_ALL_CORE_FT
follow-up cardiology appointment with REMI Harrison  6/21 @ 2:30pm [facility] Patient sees a cardiologist in [place] and prefers to call tomorrow to make her own appointment

## 2024-06-13 NOTE — DISCHARGE NOTE PROVIDER - ATTENDING DISCHARGE PHYSICAL EXAMINATION:
General NAD [id number]   HEENT MMM  Cardio s1/s2 RRR  Lung CTAB improved wheeze  GI soft+ bs nontender   Ext wwp no edema

## 2024-06-13 NOTE — PROGRESS NOTE ADULT - PROBLEM SELECTOR PLAN 1
mild trop elevation: 198->207->183, without anginal symptoms, with abnormal EKG - LVH with QRS widening [place] product - new compared with EKG from 11/23.   .  pt, with  known severe AS - refuses any invasive procedures, had LHC on 11/23 - non obstructive CAD, TTE from 11/23 with reduced LVEF 40-45%, inferior wall akinesis, severe AS, moderate MR (had MVR more than 10 years ago)  Recommendation: repeat EKG, check TTE, medical management, asa - takes 2 baby asa at home as no AC, statin, BB, no ACEI/ARB due to hypotension. Mild trop elevation: 198->207->183, without anginal symptoms, with abnormal EKG - LVH with QRS widening [place] product - new compared with EKG from 11/23.  Pt with known severe AS and refuses any invasive procedures, had LHC on 11/23 - non obstructive CAD, TTE from 11/23 with reduced LVEF 40-45%, inferior wall akinesis, severe AS, moderate MR (had MVR more than 10 years ago) and hx ASHD (CABG).  -  elevated trop likely demand ischemia in setting of acute COPD exacerbation  -  cont ASA, Toprol XL, BB  -  spoke with pt and she is refusing any invasive procedures Mild trop elevation: 198->207->183, without anginal symptoms, with abnormal EKG - LVH with QRS widening [place] product - new compared with EKG from 11/23.  Pt with known severe AS and refuses any invasive procedures, had LHC on 11/23 - non obstructive CAD, TTE from 11/23 with reduced LVEF 40-45%, inferior wall akinesis, severe AS, moderate MR (had MVR more than 10 years ago) and hx ASHD (CABG).  -  elevated trop likely demand ischemia in setting of acute COPD exacerbation in the setting of chronic heart disease  -  cont ASA, Toprol XL, BB  -  spoke with pt and she is refusing any invasive procedures

## 2024-06-13 NOTE — PROGRESS NOTE ADULT - PROBLEM SELECTOR PLAN 3
pt. with chronic Afib now in NSR 80s with PACs, not on AC due to Falls, Hx of ICH  Recommendation: monitor on tele, takes 2 baby ASA (162 mg po daily as no AC), cont. BB at home dose - monitor for hypotension. Pt with chronic Afib (no AC due to hx falls and hx ICH).  -  currently SR  -  cont Toprol XL 25mg daily, monitor for hypotension; cont home dose  qd

## 2024-06-13 NOTE — DISCHARGE NOTE NURSING/CASE MANAGEMENT/SOCIAL WORK - PATIENT PORTAL LINK FT
You can access the FollowMyHealth Patient Portal offered by [facility] by registering at the following website: [email or web address]. By joining [employer]’s FollowMyHealth portal, you will also be able to view your health information using other applications (apps) compatible with our system.

## 2024-06-13 NOTE — PROGRESS NOTE ADULT - NS ATTEND AMEND GEN_ALL_CORE FT
I discussed case with NP; stable cardiac status - should have outpatient cardiology follow-up; will f/u PRN for remainder of hospitalization.

## 2024-06-13 NOTE — PROGRESS NOTE ADULT - PROBLEM SELECTOR PLAN 4
pt. with B/L wheezes, likely 2/2 COPD exacerbation, on steroids and inhalers, management as per Hospitalist. -  cont steroids, inhalers, management per primary team

## 2024-06-13 NOTE — PROGRESS NOTE ADULT - SUBJECTIVE AND OBJECTIVE BOX
CHIEF COMPLAINT: Patient is a 89y old  Female who presents with a chief complaint of wheezing (12 Jun 2024 06:21)      HPI:  89 y/o F w/ PMH of asthma, CAD s/p CABG, paroxysmal a-fib (not on AC 2/2 fall risk & prior ICH), TIA, severe aortic stenosis, mitral valve repair, RA, p/w wheezing. Patient states she has been wheezing for a couple of months, but it recently got worse. Also c/o coughing for a couple of years, but states it also got worse and it associated with hoarseness in voice. Denies fever, chills, nausea, vomiting, abdominal pain, CP.     Cardiology consulted for elevated trops and severe AS. Pt. was previously seen on 11/23 and had LHC with non obstructive CAD and TTE showing severe AS. Pt. was supposed to fu with Structural Heart for TAVR.   Pt. lives at [facility] at present, admitted for worsening wheezing,likely due to COPD exacerb.  D/W pt. - she did not  followup with Structural heart since last admission and she does not want any invasive procedure - including TAVR/LHC. She states she has a poor quality of life due to RA, unable to ambulate and wants to sign DNR./DNI. She is a&o x 4 and understands the severity of her condition.     6/13/24:  Pt laying in bed in NAD.  States she is still wheezing however denies SOB, CP, lightheadedness, palpitations.  Tele:  SR , occ PVCs      PAST MEDICAL & SURGICAL HISTORY:  RA  TIA  ICH  Severe AS  Asthma  Sacral wounds   CAD (coronary artery disease)  CABG  MVR    SOCIAL HISTORY:   Alcohol: Denied  Smoking: Nonsmoker  Drug Use: Denied  Marital Status:     FAMILY HISTORY: non contributory       MEDICATIONS:  Home Medications:  Advair Diskus 250 mcg-50 mcg inhalation powder: ON HOLD at home (11 Jun 2024 23:38)  Aspirin Enteric Coated 81 mg oral delayed release tablet: 2 tab(s) orally once a day (at bedtime) (11 Jun 2024 23:33)  capsaicin 0.025% topical cream: Apply topically to affected area once a day , apply to affected knee (11 Jun 2024 23:38)  Centrum Silver Women&#x27;s oral tablet: 1 tab(s) orally once a day (11 Jun 2024 23:38)  Citracal 250 mg + D 200 intl units oral tablet: 2 tab(s) orally once a day (11 Jun 2024 23:38)  Claritin 24 Hour Allergy 10 mg oral tablet: 1 tab(s) orally once a day as needed for  allergy symptoms (11 Jun 2024 23:38)  clotrimazole 1% topical solution: Apply topically to affected area once a day , apply to pinky on the right hand (11 Jun 2024 23:38)  ICaps AREDS oral capsule: 2 cap(s) orally once a day (11 Jun 2024 23:34)  levothyroxine 100 mcg (0.1 mg) oral tablet: 1 tab(s) orally once a day (11 Jun 2024 23:34)  metoprolol succinate 25 mg oral tablet, extended release: 1 tab(s) orally once a day (11 Jun 2024 23:46)  pravastatin 40 mg oral tablet: 1 tab(s) orally once a day (at bedtime) (11 Jun 2024 23:46)  sucralfate 1 g oral tablet: 1 tab(s) orally twice a day before lunch and dinner (11 Jun 2024 23:38)  Systane Ultra ophthalmic solution: 1 drop(s) in each eye 3 to 4 times a day as needed for  dry eyes (11 Jun 2024 23:38)  torsemide 20 mg oral tablet: 0.5 tab(s) orally every other day (11 Jun 2024 23:38)  Tylenol 8 HR Arthritis Pain 650 mg oral tablet, extended release: 2 tab(s) orally once a day (in the afternoon) as needed for extra pain relief (11 Jun 2024 23:38)  Tylenol Arthritis Extended Release 650 mg oral tablet, extended release: 2 tab(s) orally once a day (in the morning) (11 Jun 2024 23:37)    MEDICATIONS  (STANDING):  albuterol/ipratropium for Nebulization 3 milliLiter(s) Nebulizer every 6 hours  aspirin enteric coated 162 milliGRAM(s) Oral daily  atorvastatin 10 milliGRAM(s) Oral at bedtime  azithromycin   Tablet 500 milliGRAM(s) Oral daily  calcium carbonate 1250 mG  + Vitamin D (OsCal 500 + D) 2 Tablet(s) Oral daily  clotrimazole 1% Cream 1 Application(s) Topical every 12 hours  levothyroxine 100 MICROGram(s) Oral daily  metoprolol succinate ER 25 milliGRAM(s) Oral daily  multivitamin/minerals 1 Tablet(s) Oral daily  predniSONE   Tablet 40 milliGRAM(s) Oral daily  sucralfate 1 Gram(s) Oral every 12 hours  torsemide 10 milliGRAM(s) Oral daily    MEDICATIONS  (PRN):  acetaminophen     Tablet .. 650 milliGRAM(s) Oral every 6 hours PRN Mild Pain (1 - 3)  artificial  tears Solution 1 Drop(s) Both EYES four times a day PRN Dry Eyes  loratadine 10 milliGRAM(s) Oral daily PRN for allergy symptoms    Allergies  cinnamon (Other)  Polysporin (Unknown)  Prilosec (Unknown)  nitrofurantoin (Unknown)  Flomax (Unknown)  Detrol (Unknown)  bacitracin (Unknown)  albuterol (Unknown)  clindamycin (Unknown)  famotidine (Unknown)  tetracycline (Unknown)  gabapentin (Unknown)  aspirin (Unknown)  Augmentin (Unknown)  Pepcid (Unknown)  hayfever (Unknown)  tramadol (Unknown)  ciprofloxacin (Unknown)      Vital Signs Last 24 Hrs  T(C): 36.2 (13 Jun 2024 08:18), Max: 37.5 (12 Jun 2024 13:00)  T(F): 97.1 (13 Jun 2024 08:18), Max: 99.5 (12 Jun 2024 13:00)  HR: 66 (13 Jun 2024 08:18) (66 - 84)  BP: 95/55 (13 Jun 2024 08:18) (95/55 - 120/60)  BP(mean): --  RR: 17 (13 Jun 2024 08:18) (17 - 18)  SpO2: 97% (13 Jun 2024 08:18) (93% - 97%)    Parameters below as of 13 Jun 2024 08:18  Patient On (Oxygen Delivery Method): room air    I&O's Summary  12 Jun 2024 07:01  -  13 Jun 2024 07:00  --------------------------------------------------------  IN: 0 mL / OUT: 1500 mL / NET: -1500 mL    Daily     Daily       PHYSICAL EXAM:  Constitutional: NAD, awake and alert  HEENT:  EOMI,  Pupils round, No oral cyanosis.  Pulmonary: B/L wheezes   Cardiovascular: S1 and S2, regular rate and rhythm, III/VI systolic murmur  Gastrointestinal: Bowel Sounds present, soft, nontender.   Lymph: No peripheral edema. No cervical lymphadenopathy.  Neurological: Alert, no focal deficits  Skin: No rashes.  Psych:  Mood & affect appropriate      LABS: reviewed                                  9.7    6.37  )-----------( 292      ( 12 Jun 2024 10:58 )             30.8                10.2   5.67  )-----------( 286      ( 11 Jun 2024 21:44 )             32.1     12 Jun 2024 09:08    134    |  102    |  29     ----------------------------<  132    4.2     |  26     |  0.86   11 Jun 2024 21:44    137    |  105    |  34     ----------------------------<  123    4.6     |  30     |  0.88     Ca    10.4       12 Jun 2024 09:08  Ca    10.1       11 Jun 2024 21:44  Mg     2.0       11 Jun 2024 21:44    TPro  8.1    /  Alb  3.5    /  TBili  0.3    /  DBili  x      /  AST  21     /  ALT  17     /  AlkPhos  61     12 Jun 2024 09:08  TPro  7.6    /  Alb  3.5    /  TBili  0.2    /  DBili  x      /  AST  15     /  ALT  21     /  AlkPhos  58     11 Jun 2024 21:44    PT/INR - ( 11 Jun 2024 21:44 )   PT: 9.8 sec;   INR: 0.86 ratio      PTT - ( 11 Jun 2024 21:44 )  PTT:27.9 sec    Trop (6/11)  198-->(6/12)  207.8-->183.9  BNP (6/11)  1943        Radiology/EKG/TTE/LHC: reviewed   < from: 12 Lead ECG (06.11.24 @ 21:35) >  Diagnosis Line Sinus rhythm [id number] degree A-V block  Left ventricular hypertrophy with QRS widening ( R in aVL , [place] product )  Inferior infarct , age undetermined  Abnormal ECG  When compared with ECG of 15-MAR-2023 05:26,  Inferior infarct is now Present  Nonspecific T wave abnormality now evident in Lateral leads  Confirmed by MD TALAVERA PAUL ([id number]) on 6/12/2024 10:39:36 AM  < end of copied text >  -----------------------------------------------------------------------------------------------------    < from: TTE W or WO Ultrasound Enhancing Agent (06.12.24 @ 13:50) >  CONCLUSIONS:   1. Left ventricular systolic function is moderately decreased with an ejection fraction of 40 % by Patel's method of disks 31 % by 3D with an ejection fraction visually estimated at 35 to 40 %.   2. Multiple segmental abnormalities exist. See findings.   3. The left atrium is severely dilated.   4. Mild tricuspid regurgitation.   5. Estimated pulmonary artery systolic pressure is 37 mmHg, consistent with mild pulmonary hypertension.   6. Interatrial septum is stretched and displaced to the right, consistent with left atrial volume overload.   7. Severe aortic stenosis.  < end of copied text >    < from: TTE Echo Complete w/o Contrast w/ Doppler (11.01.22 @ 08:21) >   Summary   Significant fibrocalcific changes noted to the aortic valve leaflets with   restriction in leaflet excursion. Calculated LAURA is .85cm2 ; this finding   is consistent with severe aortic stenosis.   The left atrium is moderately dilated.   Mild concentric left ventricular hypertrophy is present.   Moderately reduced left ventricular systolic function with inferior wall   akinesis.   Estimated left ventricular ejection fraction is 40-45 %.   The IVC appears normal.   Moderate (2+) mitral regurgitation is present.   Mild mitral annular calcification is present.   Fibrocalcific changes noted to the mitral valve repair-posterior leaflet   with reduced leaflet excursion.   No evidence of pericardial effusion.   No evidence of pleural effusion.  Normal appearing pulmonic valve structure and function.   Normal appearing right atrium.   Normal appearing right ventricle structure and function.   Mild (1+) tricuspid valve regurgitation is present.   Signature   ----------------------------------------------------------------   Electronically signed by Radha Romero MD(Interpreting   physician) on 11/01/2022 10:05 AM   ----------------------------------------------------------------  < end of copied text >    < from: Cardiac Catheterization (11.02.22 @ 15:04) >   Impression   Diagnostic Conclusions   Non-obstructive coronary artery disease. EF 45% with LVEDP 9. LV-Ao   gradient 5.   Recommendations   Recommend aggressive medical management for non-obstructive CAD. Patient   to follow up in structural heart office at [facility] regarding   further investigation of low gradient aortic stenos  < end of copied text >    RADIOLOGY:    < from: Xray Chest 1 View- PORTABLE-Urgent (Xray Chest 1 View- PORTABLE-Urgent .) (06.11.24 @ 21:25) >  IMPRESSION: Small scattered lung infiltrates presently suggested.  --- End of Report ---  < end of copied text >     CHIEF COMPLAINT: Patient is a 89y old  Female who presents with a chief complaint of wheezing (12 Jun 2024 06:21)      HPI:  89 y/o F w/ PMH of asthma, CAD s/p CABG, paroxysmal a-fib (not on AC 2/2 fall risk & prior ICH), TIA, severe aortic stenosis, mitral valve repair, RA, p/w wheezing. Patient states she has been wheezing for a couple of months, but it recently got worse. Also c/o coughing for a couple of years, but states it also got worse and it associated with hoarseness in voice. Denies fever, chills, nausea, vomiting, abdominal pain, CP.     Cardiology consulted for elevated trops and severe AS. Pt. was previously seen on 11/23 and had LHC with non obstructive CAD and TTE showing severe AS. Pt. was supposed to fu with Structural Heart for TAVR.   Pt. lives at [facility] at present, admitted for worsening wheezing,likely due to COPD exacerb.  D/W pt. - she did not  followup with Structural heart since last admission and she does not want any invasive procedure - including TAVR/LHC. She states she has a poor quality of life due to RA, unable to ambulate and wants to sign DNR./DNI. She is a&o x 4 and understands the severity of her condition.     6/13/24:  Pt laying in bed in NAD.  States she is still wheezing however denies SOB, CP, lightheadedness, palpitations.  Tele:  SR , occ PVCs      PAST MEDICAL & SURGICAL HISTORY:  RA  TIA  ICH  Severe AS  Asthma  Sacral wounds   CAD (coronary artery disease)  CABG  MVR    SOCIAL HISTORY:   Alcohol: Denied  Smoking: Nonsmoker  Drug Use: Denied  Marital Status:     FAMILY HISTORY: non contributory       MEDICATIONS:  Home Medications:  Advair Diskus 250 mcg-50 mcg inhalation powder: ON HOLD at home (11 Jun 2024 23:38)  Aspirin Enteric Coated 81 mg oral delayed release tablet: 2 tab(s) orally once a day (at bedtime) (11 Jun 2024 23:33)  capsaicin 0.025% topical cream: Apply topically to affected area once a day , apply to affected knee (11 Jun 2024 23:38)  Centrum Silver Women&#x27;s oral tablet: 1 tab(s) orally once a day (11 Jun 2024 23:38)  Citracal 250 mg + D 200 intl units oral tablet: 2 tab(s) orally once a day (11 Jun 2024 23:38)  Claritin 24 Hour Allergy 10 mg oral tablet: 1 tab(s) orally once a day as needed for  allergy symptoms (11 Jun 2024 23:38)  clotrimazole 1% topical solution: Apply topically to affected area once a day , apply to pinky on the right hand (11 Jun 2024 23:38)  ICaps AREDS oral capsule: 2 cap(s) orally once a day (11 Jun 2024 23:34)  levothyroxine 100 mcg (0.1 mg) oral tablet: 1 tab(s) orally once a day (11 Jun 2024 23:34)  metoprolol succinate 25 mg oral tablet, extended release: 1 tab(s) orally once a day (11 Jun 2024 23:46)  pravastatin 40 mg oral tablet: 1 tab(s) orally once a day (at bedtime) (11 Jun 2024 23:46)  sucralfate 1 g oral tablet: 1 tab(s) orally twice a day before lunch and dinner (11 Jun 2024 23:38)  Systane Ultra ophthalmic solution: 1 drop(s) in each eye 3 to 4 times a day as needed for  dry eyes (11 Jun 2024 23:38)  torsemide 20 mg oral tablet: 0.5 tab(s) orally every other day (11 Jun 2024 23:38)  Tylenol 8 HR Arthritis Pain 650 mg oral tablet, extended release: 2 tab(s) orally once a day (in the afternoon) as needed for extra pain relief (11 Jun 2024 23:38)  Tylenol Arthritis Extended Release 650 mg oral tablet, extended release: 2 tab(s) orally once a day (in the morning) (11 Jun 2024 23:37)    MEDICATIONS  (STANDING):  albuterol/ipratropium for Nebulization 3 milliLiter(s) Nebulizer every 6 hours  aspirin enteric coated 162 milliGRAM(s) Oral daily  atorvastatin 10 milliGRAM(s) Oral at bedtime  azithromycin   Tablet 500 milliGRAM(s) Oral daily  calcium carbonate 1250 mG  + Vitamin D (OsCal 500 + D) 2 Tablet(s) Oral daily  clotrimazole 1% Cream 1 Application(s) Topical every 12 hours  levothyroxine 100 MICROGram(s) Oral daily  metoprolol succinate ER 25 milliGRAM(s) Oral daily  multivitamin/minerals 1 Tablet(s) Oral daily  predniSONE   Tablet 40 milliGRAM(s) Oral daily  sucralfate 1 Gram(s) Oral every 12 hours  torsemide 10 milliGRAM(s) Oral daily    MEDICATIONS  (PRN):  acetaminophen     Tablet .. 650 milliGRAM(s) Oral every 6 hours PRN Mild Pain (1 - 3)  artificial  tears Solution 1 Drop(s) Both EYES four times a day PRN Dry Eyes  loratadine 10 milliGRAM(s) Oral daily PRN for allergy symptoms    Allergies  cinnamon (Other)  Polysporin (Unknown)  Prilosec (Unknown)  nitrofurantoin (Unknown)  Flomax (Unknown)  Detrol (Unknown)  bacitracin (Unknown)  albuterol (Unknown)  clindamycin (Unknown)  famotidine (Unknown)  tetracycline (Unknown)  gabapentin (Unknown)  aspirin (Unknown)  Augmentin (Unknown)  Pepcid (Unknown)  hayfever (Unknown)  tramadol (Unknown)  ciprofloxacin (Unknown)      Vital Signs Last 24 Hrs  T(C): 36.2 (13 Jun 2024 08:18), Max: 37.5 (12 Jun 2024 13:00)  T(F): 97.1 (13 Jun 2024 08:18), Max: 99.5 (12 Jun 2024 13:00)  HR: 66 (13 Jun 2024 08:18) (66 - 84)  BP: 95/55 (13 Jun 2024 08:18) (95/55 - 120/60)  BP(mean): --  RR: 17 (13 Jun 2024 08:18) (17 - 18)  SpO2: 97% (13 Jun 2024 08:18) (93% - 97%)    Parameters below as of 13 Jun 2024 08:18  Patient On (Oxygen Delivery Method): room air    I&O's Summary  12 Jun 2024 07:01  -  13 Jun 2024 07:00  --------------------------------------------------------  IN: 0 mL / OUT: 1500 mL / NET: -1500 mL    Daily     Daily       PHYSICAL EXAM:  Constitutional: NAD, awake and alert  HEENT:  EOMI,  Pupils round, No oral cyanosis.  Pulmonary: B/L wheezes   Cardiovascular: S1 and S2, regular rate and rhythm, III/VI systolic murmur  Gastrointestinal: Bowel Sounds present, soft, nontender.   Lymph: No peripheral edema. No cervical lymphadenopathy.  Neurological: Alert, no focal deficits  Skin: No rashes.  Psych:  Mood & affect appropriate      LABS: reviewed                                  9.7    6.37  )-----------( 292      ( 12 Jun 2024 10:58 )             30.8                10.2   5.67  )-----------( 286      ( 11 Jun 2024 21:44 )             32.1     12 Jun 2024 09:08    134    |  102    |  29     ----------------------------<  132    4.2     |  26     |  0.86   11 Jun 2024 21:44    137    |  105    |  34     ----------------------------<  123    4.6     |  30     |  0.88     Ca    10.4       12 Jun 2024 09:08  Ca    10.1       11 Jun 2024 21:44  Mg     2.0       11 Jun 2024 21:44    TPro  8.1    /  Alb  3.5    /  TBili  0.3    /  DBili  x      /  AST  21     /  ALT  17     /  AlkPhos  61     12 Jun 2024 09:08  TPro  7.6    /  Alb  3.5    /  TBili  0.2    /  DBili  x      /  AST  15     /  ALT  21     /  AlkPhos  58     11 Jun 2024 21:44    PT/INR - ( 11 Jun 2024 21:44 )   PT: 9.8 sec;   INR: 0.86 ratio      PTT - ( 11 Jun 2024 21:44 )  PTT:27.9 sec    Trop (6/11)  198-->(6/12)  207.8-->183.9  BNP (6/11)  1943        Radiology/EKG/TTE/LHC: reviewed   < from: 12 Lead ECG (06.11.24 @ 21:35) >  Diagnosis Line Sinus rhythm [id number] degree A-V block  Left ventricular hypertrophy with QRS widening ( R in aVL , [place] product )  Inferior infarct , age undetermined  Abnormal ECG  When compared with ECG of 15-MAR-2023 05:26,  Inferior infarct is now Present  Nonspecific T wave abnormality now evident in Lateral leads  Confirmed by MD TALAVERA PAUL ([id number]) on 6/12/2024 10:39:36 AM  < end of copied text >    < from: 12 Lead ECG (06.12.24 @ 08:34) >  Diagnosis Line Sinus tachycardia with 1st degree A-V block  Left ventricular hypertrophy with QRS widening  Possible Inferior infarct (cited on or before 30-OCT-2022)  Abnormal ECG  When compared with ECG of 11-JUN-2024 21:35,  Vent. rate has increased BY  36 BPM  QT has lengthened  Confirmed by MD TALAVERA PAUL ([id number]) on 6/12/2024 9:30:22 PM  < end of copied text >    -----------------------------------------------------------------------------------------------------    < from: TTE W or WO Ultrasound Enhancing Agent (06.12.24 @ 13:50) >  CONCLUSIONS:   1. Left ventricular systolic function is moderately decreased with an ejection fraction of 40 % by Patel's method of disks 31 % by 3D with an ejection fraction visually estimated at 35 to 40 %.   2. Multiple segmental abnormalities exist. See findings.   3. The left atrium is severely dilated.   4. Mild tricuspid regurgitation.   5. Estimated pulmonary artery systolic pressure is 37 mmHg, consistent with mild pulmonary hypertension.   6. Interatrial septum is stretched and displaced to the right, consistent with left atrial volume overload.   7. Severe aortic stenosis.  < end of copied text >    < from: TTE Echo Complete w/o Contrast w/ Doppler (11.01.22 @ 08:21) >   Summary   Significant fibrocalcific changes noted to the aortic valve leaflets with   restriction in leaflet excursion. Calculated LAURA is .85cm2 ; this finding   is consistent with severe aortic stenosis.   The left atrium is moderately dilated.   Mild concentric left ventricular hypertrophy is present.   Moderately reduced left ventricular systolic function with inferior wall   akinesis.   Estimated left ventricular ejection fraction is 40-45 %.   The IVC appears normal.   Moderate (2+) mitral regurgitation is present.   Mild mitral annular calcification is present.   Fibrocalcific changes noted to the mitral valve repair-posterior leaflet   with reduced leaflet excursion.   No evidence of pericardial effusion.   No evidence of pleural effusion.  Normal appearing pulmonic valve structure and function.   Normal appearing right atrium.   Normal appearing right ventricle structure and function.   Mild (1+) tricuspid valve regurgitation is present.   Signature   ----------------------------------------------------------------   Electronically signed by Radha Romero MD(Interpreting   physician) on 11/01/2022 10:05 AM   ----------------------------------------------------------------  < end of copied text >    < from: Cardiac Catheterization (11.02.22 @ 15:04) >   Impression   Diagnostic Conclusions   Non-obstructive coronary artery disease. EF 45% with LVEDP 9. LV-Ao   gradient 5.   Recommendations   Recommend aggressive medical management for non-obstructive CAD. Patient   to follow up in structural heart office at [facility] regarding   further investigation of low gradient aortic stenos  < end of copied text >    RADIOLOGY:    < from: Xray Chest 1 View- PORTABLE-Urgent (Xray Chest 1 View- PORTABLE-Urgent .) (06.11.24 @ 21:25) >  IMPRESSION: Small scattered lung infiltrates presently suggested.  --- End of Report ---  < end of copied text >     CHIEF COMPLAINT: Patient is a 89y old  Female who presents with a chief complaint of wheezing (12 Jun 2024 06:21)      HPI:  89 y/o F w/ PMH of asthma, CAD s/p CABG, paroxysmal a-fib (not on AC 2/2 fall risk & prior ICH), TIA, severe aortic stenosis, mitral valve repair, RA, p/w wheezing. Patient states she has been wheezing for a couple of months, but it recently got worse. Also c/o coughing for a couple of years, but states it also got worse and it associated with hoarseness in voice. Denies fever, chills, nausea, vomiting, abdominal pain, CP.     Cardiology consulted for elevated trops and severe AS. Pt. was previously seen on 11/23 and had LHC with non obstructive CAD and TTE showing severe AS. Pt. was supposed to fu with Structural Heart for TAVR.   Pt. lives at [facility] at present, admitted for worsening wheezing,likely due to COPD exacerb.  D/W pt. - she did not  followup with Structural heart since last admission and she does not want any invasive procedure - including TAVR/LHC. She states she has a poor quality of life due to RA, unable to ambulate and wants to sign DNR./DNI. She is a&o x 4 and understands the severity of her condition.     6/13/24:  Pt laying in bed in NAD.  States she is still wheezing however denies SOB, CP, lightheadedness, palpitations.  Tele:  SR , occ PVCs    MEDICATIONS:  Home Medications:  Advair Diskus 250 mcg-50 mcg inhalation powder: ON HOLD at home (11 Jun 2024 23:38)  Aspirin Enteric Coated 81 mg oral delayed release tablet: 2 tab(s) orally once a day (at bedtime) (11 Jun 2024 23:33)  capsaicin 0.025% topical cream: Apply topically to affected area once a day , apply to affected knee (11 Jun 2024 23:38)  Centrum Silver Women&#x27;s oral tablet: 1 tab(s) orally once a day (11 Jun 2024 23:38)  Citracal 250 mg + D 200 intl units oral tablet: 2 tab(s) orally once a day (11 Jun 2024 23:38)  Claritin 24 Hour Allergy 10 mg oral tablet: 1 tab(s) orally once a day as needed for  allergy symptoms (11 Jun 2024 23:38)  clotrimazole 1% topical solution: Apply topically to affected area once a day , apply to pinky on the right hand (11 Jun 2024 23:38)  ICaps AREDS oral capsule: 2 cap(s) orally once a day (11 Jun 2024 23:34)  levothyroxine 100 mcg (0.1 mg) oral tablet: 1 tab(s) orally once a day (11 Jun 2024 23:34)  metoprolol succinate 25 mg oral tablet, extended release: 1 tab(s) orally once a day (11 Jun 2024 23:46)  pravastatin 40 mg oral tablet: 1 tab(s) orally once a day (at bedtime) (11 Jun 2024 23:46)  sucralfate 1 g oral tablet: 1 tab(s) orally twice a day before lunch and dinner (11 Jun 2024 23:38)  Systane Ultra ophthalmic solution: 1 drop(s) in each eye 3 to 4 times a day as needed for  dry eyes (11 Jun 2024 23:38)  torsemide 20 mg oral tablet: 0.5 tab(s) orally every other day (11 Jun 2024 23:38)  Tylenol 8 HR Arthritis Pain 650 mg oral tablet, extended release: 2 tab(s) orally once a day (in the afternoon) as needed for extra pain relief (11 Jun 2024 23:38)  Tylenol Arthritis Extended Release 650 mg oral tablet, extended release: 2 tab(s) orally once a day (in the morning) (11 Jun 2024 23:37)    MEDICATIONS  (STANDING):  albuterol/ipratropium for Nebulization 3 milliLiter(s) Nebulizer every 6 hours  aspirin enteric coated 162 milliGRAM(s) Oral daily  atorvastatin 10 milliGRAM(s) Oral at bedtime  azithromycin   Tablet 500 milliGRAM(s) Oral daily  calcium carbonate 1250 mG  + Vitamin D (OsCal 500 + D) 2 Tablet(s) Oral daily  clotrimazole 1% Cream 1 Application(s) Topical every 12 hours  levothyroxine 100 MICROGram(s) Oral daily  metoprolol succinate ER 25 milliGRAM(s) Oral daily  multivitamin/minerals 1 Tablet(s) Oral daily  predniSONE   Tablet 40 milliGRAM(s) Oral daily  sucralfate 1 Gram(s) Oral every 12 hours  torsemide 10 milliGRAM(s) Oral daily    MEDICATIONS  (PRN):  acetaminophen     Tablet .. 650 milliGRAM(s) Oral every 6 hours PRN Mild Pain (1 - 3)  artificial  tears Solution 1 Drop(s) Both EYES four times a day PRN Dry Eyes  loratadine 10 milliGRAM(s) Oral daily PRN for allergy symptoms    Vital Signs Last 24 Hrs  T(C): 36.2 (13 Jun 2024 08:18), Max: 37.5 (12 Jun 2024 13:00)  T(F): 97.1 (13 Jun 2024 08:18), Max: 99.5 (12 Jun 2024 13:00)  HR: 66 (13 Jun 2024 08:18) (66 - 84)  BP: 95/55 (13 Jun 2024 08:18) (95/55 - 120/60)  RR: 17 (13 Jun 2024 08:18) (17 - 18)  SpO2: 97% (13 Jun 2024 08:18) (93% - 97%)  Patient On (Oxygen Delivery Method): room air    PHYSICAL EXAM:  Constitutional: NAD, awake and alert  HEENT:  EOMI,  Pupils round, No oral cyanosis.  Pulmonary: B/L wheezes   Cardiovascular: S1 and S2, regular rate and rhythm, III/VI systolic murmur  Gastrointestinal: Bowel Sounds present, soft, nontender.   Lymph: No peripheral edema. No cervical lymphadenopathy.  Neurological: Alert, no focal deficits  Skin: No rashes.  Psych:  Mood & affect appropriate    LABS:                                       9.7    6.37  )-----------( 292      ( 12 Jun 2024 10:58 )             30.8     134<L>  |  102  |  29<H>  ----------------------------<  132<H>  4.2   |  26  |  0.86    Ca    10.4<H>      12 Jun 2024 09:08  Mg     2.0     06-11    TPro  8.1  /  Alb  3.5  /  TBili  0.3  /  DBili  x   /  AST  21  /  ALT  17  /  AlkPhos  61  06-12    TroponinI hsT: <-183.90, <-207.82, <-198.02    12 Lead ECG (06.12.24 @ 08:34):  Sinus tachycardia with 1st degree A-V block  Left ventricular hypertrophy with QRS widening  Possible Inferior infarct (cited on or before 30-OCT-2022)  Abnormal ECG    TTE W or WO Ultrasound Enhancing Agent (06.12.24 @ 13:50):  CONCLUSIONS:   1. Left ventricular systolic function is moderately decreased with an ejection fraction of 40 % by Patel's method of disks 31 % by 3D with an ejection fraction visually estimated at 35 to 40 %.   2. Multiple segmental abnormalities exist. See findings.   3. The left atrium is severely dilated.   4. Mild tricuspid regurgitation.   5. Estimated pulmonary artery systolic pressure is 37 mmHg, consistent with mild pulmonary hypertension.   6. Interatrial septum is stretched and displaced to the right, consistent with left atrial volume overload.   7. Severe aortic stenosis.  < end of copied text >    Cardiac Catheterization (11.02.22 @ 15:04):  Non-obstructive coronary artery disease. EF 45% with LVEDP 9. LV-Ao gradient 5.    Xray Chest 1 View- PORTABLE-Urgent (Xray Chest 1 View- PORTABLE-Urgent .) (06.11.24 @ 21:25): Small scattered lung infiltrates presently suggested.

## 2024-06-13 NOTE — DISCHARGE NOTE PROVIDER - NSDCMRMEDTOKEN_GEN_ALL_CORE_FT
Advair Diskus 250 mcg-50 mcg inhalation powder: ON HOLD at home  Aspirin Enteric Coated 81 mg oral delayed release tablet: 2 tab(s) orally once a day (at bedtime)  capsaicin 0.025% topical cream: Apply topically to affected area once a day , apply to affected knee  Centrum Silver Women&#x27;s oral tablet: 1 tab(s) orally once a day  Citracal 250 mg + D 200 intl units oral tablet: 2 tab(s) orally once a day  Claritin 24 Hour Allergy 10 mg oral tablet: 1 tab(s) orally once a day as needed for  allergy symptoms  clotrimazole 1% topical solution: Apply topically to affected area once a day , apply to pinky on the right hand  ICaps AREDS oral capsule: 2 cap(s) orally once a day  levothyroxine 100 mcg (0.1 mg) oral tablet: 1 tab(s) orally once a day  metoprolol succinate 25 mg oral tablet, extended release: 1 tab(s) orally once a day  pravastatin 40 mg oral tablet: 1 tab(s) orally once a day (at bedtime)  predniSONE 20 mg oral tablet: 2 tab(s) orally once a day  sucralfate 1 g oral tablet: 1 tab(s) orally twice a day before lunch and dinner  Systane Ultra ophthalmic solution: 1 drop(s) in each eye 3 to 4 times a day as needed for  dry eyes  torsemide 20 mg oral tablet: 0.5 tab(s) orally every other day  Tylenol 8 HR Arthritis Pain 650 mg oral tablet, extended release: 2 tab(s) orally once a day (in the afternoon) as needed for extra pain relief  Tylenol Arthritis Extended Release 650 mg oral tablet, extended release: 2 tab(s) orally once a day (in the morning)

## 2024-06-13 NOTE — PROGRESS NOTE ADULT - PROBLEM SELECTOR PLAN 2
Pt with known severe AS on TTE and LHC from November 2022, and has refused any invasive procedures.  Elevated BNP 1943.  -  pt appears euvolemic  -  TTE (6/12) EF 35-40, mild TR, severe AS  -  spoke with pt, and she is presently again refusing any invasive procedures  -  cont med tx with BB, torsemide 10mg qd; no ACE-I / ARB due to hypotension  Recommendation: TTE, medical management with BB and home dose diuretic - torsemide 20 mg po daily. Pt with known severe AS on TTE and LHC from November 2022, and has refused any invasive procedures.  Elevated BNP 1943.  -  pt appears euvolemic  -  TTE (6/12) EF 35-40, mild TR, severe AS  -  spoke with pt, and she is presently again refusing any invasive procedures  -  cont med tx with BB, torsemide 10mg qd; no ACE-I / ARB due to hypotension

## 2024-06-13 NOTE — DISCHARGE NOTE PROVIDER - NSDCCPCAREPLAN_GEN_ALL_CORE_FT
13-Feb-2023 10:36
13-Feb-2023 11:12
12-Feb-2023 14:34
11-Feb-2023 23:06
PRINCIPAL DISCHARGE DIAGNOSIS  Diagnosis: Acute asthma exacerbation  Assessment and Plan of Treatment: You were thought to have asthma excerbation most likely cause of your wheezing.  Continue prednisone as prescribed.      SECONDARY DISCHARGE DIAGNOSES  Diagnosis: Chronic atrial fibrillation  Assessment and Plan of Treatment: Continue your rate control medications.    Diagnosis: Chronic systolic heart failure  Assessment and Plan of Treatment: You were found to have systolic heart failure similiar to previous findings.  You chose to not pursue further investigation.

## 2024-06-13 NOTE — DISCHARGE NOTE PROVIDER - HOSPITAL COURSE
89 y/o F w/ PMH of asthma, CAD s/p CABG, paroxysmal a-fib (not on AC 2/2 fall risk & prior ICH), TIA, severe aortic stenosis, mitral valve repair, RA, p/w wheezing. Patient states she has been wheezing for a couple of months.  Patient lab work was unremarkable for infection.  Seen and evaluated by cardiology, no further inpatient workup recommended, patient continues to have systolic HF not in exacerbation.  She was placed on steroids for asthma exacerbation.

## 2024-06-20 DIAGNOSIS — I48.20 CHRONIC ATRIAL FIBRILLATION, UNSPECIFIED: ICD-10-CM

## 2024-06-20 DIAGNOSIS — Z88.6 ALLERGY STATUS TO ANALGESIC AGENT: ICD-10-CM

## 2024-06-20 DIAGNOSIS — Z79.82 LONG TERM (CURRENT) USE OF ASPIRIN: ICD-10-CM

## 2024-06-20 DIAGNOSIS — J45.901 UNSPECIFIED ASTHMA WITH (ACUTE) EXACERBATION: ICD-10-CM

## 2024-06-20 DIAGNOSIS — Z86.73 PERSONAL HISTORY OF TRANSIENT ISCHEMIC ATTACK (TIA), AND CEREBRAL INFARCTION WITHOUT RESIDUAL DEFICITS: ICD-10-CM

## 2024-06-20 DIAGNOSIS — I25.10 ATHEROSCLEROTIC HEART DISEASE OF NATIVE CORONARY ARTERY WITHOUT ANGINA PECTORIS: ICD-10-CM

## 2024-06-20 DIAGNOSIS — I35.0 NONRHEUMATIC AORTIC (VALVE) STENOSIS: ICD-10-CM

## 2024-06-20 DIAGNOSIS — J44.1 CHRONIC OBSTRUCTIVE PULMONARY DISEASE WITH (ACUTE) EXACERBATION: ICD-10-CM

## 2024-06-20 DIAGNOSIS — Z66 DO NOT RESUSCITATE: ICD-10-CM

## 2024-06-20 DIAGNOSIS — Z95.1 PRESENCE OF AORTOCORONARY BYPASS GRAFT: ICD-10-CM

## 2024-06-20 DIAGNOSIS — Z91.018 ALLERGY TO OTHER FOODS: ICD-10-CM

## 2024-06-20 DIAGNOSIS — Z79.51 LONG TERM (CURRENT) USE OF INHALED STEROIDS: ICD-10-CM

## 2024-06-20 DIAGNOSIS — M06.9 RHEUMATOID ARTHRITIS, UNSPECIFIED: ICD-10-CM

## 2024-06-20 DIAGNOSIS — I50.22 CHRONIC SYSTOLIC (CONGESTIVE) HEART FAILURE: ICD-10-CM

## 2024-06-20 DIAGNOSIS — Z88.0 ALLERGY STATUS TO PENICILLIN: ICD-10-CM

## 2024-06-20 DIAGNOSIS — Z79.890 HORMONE REPLACEMENT THERAPY: ICD-10-CM

## 2024-06-21 ENCOUNTER — APPOINTMENT (OUTPATIENT)
Dept: CARDIOLOGY | Facility: CLINIC | Age: 89
End: 2024-06-21

## 2024-09-20 NOTE — ED ADULT NURSE NOTE - CAS EDN DISCHARGE INTERVENTIONS
[FreeTextEntry1] : 54 yo F with PMH of HLD, HTN, SAH (5/2024), s/p cardiac cath?, here for follow-up of Diabetes   Hospital Course (5/3-5/17/24): 52F, no AC/AP, pmh diabetes, p/w severe sudden-onset bifrontal HA this AM with associated neck stiffness/blurry vision. 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 LP in ED w/ 14957 RBCs, and xanthochromic. Afebrile, WBC wnl. PLTs wnl, Coags wnl. Endocrinology was consulted for management of diabetes mellitus. Admitted with SAH/DKA (AG 20, bicarb 18, BHB 7.3)/UTI. Patient presented with BG in the 300s with DKA. Pt with T2DM and insulin deficiency 2/2 long standing uncontrolled DM and low c-peptide placing pt at risk for DKA. Patient has poor vision as per team > cannot see in left eye and rt eye is blurry.  Pt with anemia while hospitalized. GI consulted for history of dark stools. Not complaining of any current symptoms and rec'd for outpatient follow up. CT A/P done (5/13/2024) and showed suggestion of pancreatic head fullness with mild adjacent fat stranding. Differential possibilities include acute pancreatitis, cannot exclude underlying pancreatic lesion by noncontrast assessment. Recommend lipase correlation. Advise follow-up CT abdomen pancreas protocol with intravenous contrast. INPATIENT DISCHARGE DM MEDS: Needs to c/w basal bolus insulin since pt was in DKA at admission and has low c peptide. Will go home with home care. Will use Prodigy glucometer for visually impaired. Basal insulin 12units QHS. Bolus insulin 6 units with each meal.  Interval hx: She met with DM educator on 8/9/24- discussed taking novolog at start of meals and not taking an additional novolog bolus after meals and discussed starting Florecita 3 CGM  CURRENT HPI: Diagnosis: T2DM >14 years ago, now with low c-peptide 0.2. Adherence: used to be non-adherent to insulin  A1c: >15.5% (5/4/2024) --> 9.5% (7/5/2024).  Pertinent labs: C-peptide 0.2 low with gluc 121 (5/11/2024).  EBEN 0.00, ZT8 <15, ICA <1:4 (5/5/2024). IA2 Ab Negative (7/5/24). Lipase 38 wnl (5/14/2024).  Current DM Medications: -Basaglar 12->15 units qhs (adherent)  -Novolog 6->8 units qac (adherent, takes right at start of meal)  Past DM medications:  -had never been on Novolin before per patient -Used to take Basaglar up to 20 units with Novolog 20 units qac (has been on insulin for many years)  Complications: +DKA (5/2024).  Macrovascular complications: +SAH (5/2024). ?CAD. Eyes: Last ophthalmology visit 5/31/2024. +Diabetic retinopathy, reports was told she has swelling and bleeding in the eye and needs surgery. +Has blurry vision. +s/p L cataract surgery (7/1/2024). Feet: Last podiatry visit 6/2024. +Stabbing shooting sensation in bilateral toes and heels, funny feeling in fingers too. Reports some swelling in legs. Denies h/o foot ulcers or sores. +Some fungus on toes.  Nephrology: eGFR 57 (7/2024). +UACR 3779 (5/31/2024). +Reports h/o UTI comes and goes, last UTI (5/2024). Also has genital itching. +Reports slow stream when urinating. Denies polyuria or polydipsia. Used to have frequent urination.  Weight Change: gradual weight gain since starting insulin, currently *** lbs  SMBG (using 's Freestyle Lite sometimes, has Prodigy voicemeter): checks 4x/day Florecita 3 *** ***  Hypoglycemia: *** Hypoglycemia unawareness: ***  Current Diet: ***  Exercise: ***  PMH: DM, DKA (5/2024), HLD, HTN, SAH (5/2024), s/p cardiac cath? FHx: mom (T2DM), brother (passed from DM), uncle (cancer), cousin (cancer), denies FHx of thyroid cancer, +pancreatic head fullness Current Meds: DM meds as above, atorvastatin 40 mg daily, amlodipine, a "blue pill in the morning to help urination" Supplements: none PCP: Dr. Grant
[FreeTextEntry1] : 54 yo F with PMH of HLD, HTN, SAH (5/2024), s/p cardiac cath?, here for follow-up of Diabetes   Hospital Course (5/3-5/17/24): 52F, no AC/AP, pmh diabetes, p/w severe sudden-onset bifrontal HA this AM with associated neck stiffness/blurry vision. 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 LP in ED w/ 16005 RBCs, and xanthochromic. Afebrile, WBC wnl. PLTs wnl, Coags wnl. Endocrinology was consulted for management of diabetes mellitus. Admitted with SAH/DKA (AG 20, bicarb 18, BHB 7.3)/UTI. Patient presented with BG in the 300s with DKA. Pt with T2DM and insulin deficiency 2/2 long standing uncontrolled DM and low c-peptide placing pt at risk for DKA. Patient has poor vision as per team > cannot see in left eye and rt eye is blurry.  Pt with anemia while hospitalized. GI consulted for history of dark stools. Not complaining of any current symptoms and rec'd for outpatient follow up. CT A/P done (5/13/2024) and showed suggestion of pancreatic head fullness with mild adjacent fat stranding. Differential possibilities include acute pancreatitis, cannot exclude underlying pancreatic lesion by noncontrast assessment. Recommend lipase correlation. Advise follow-up CT abdomen pancreas protocol with intravenous contrast. INPATIENT DISCHARGE DM MEDS: Needs to c/w basal bolus insulin since pt was in DKA at admission and has low c peptide. Will go home with home care. Will use Prodigy glucometer for visually impaired. Basal insulin 12units QHS. Bolus insulin 6 units with each meal.  Interval hx: She met with DM educator on 8/9/24- discussed taking novolog at start of meals and not taking an additional novolog bolus after meals and discussed starting Florecita 3 CGM  CURRENT HPI: Diagnosis: T2DM >14 years ago, now with low c-peptide 0.2. Adherence: used to be non-adherent to insulin  A1c: >15.5% (5/4/2024) --> 9.5% (7/5/2024).  Pertinent labs: C-peptide 0.2 low with gluc 121 (5/11/2024).  EBEN 0.00, ZT8 <15, ICA <1:4 (5/5/2024). IA2 Ab Negative (7/5/24). Lipase 38 wnl (5/14/2024).  Current DM Medications: -Basaglar 12->15 units qhs (adherent)  -Novolog 6->8 units qac (adherent, takes right at start of meal)  Past DM medications:  -had never been on Novolin before per patient -Used to take Basaglar up to 20 units with Novolog 20 units qac (has been on insulin for many years)  Complications: +DKA (5/2024).  Macrovascular complications: +SAH (5/2024). ?CAD. Eyes: Last ophthalmology visit 5/31/2024. +Diabetic retinopathy, reports was told she has swelling and bleeding in the eye and needs surgery. +Has blurry vision. +s/p L cataract surgery (7/1/2024). Feet: Last podiatry visit 6/2024. +Stabbing shooting sensation in bilateral toes and heels, funny feeling in fingers too. Reports some swelling in legs. Denies h/o foot ulcers or sores. +Some fungus on toes.  Nephrology: eGFR 57 (7/2024). +UACR 3779 (5/31/2024). +Reports h/o UTI comes and goes, last UTI (5/2024). Also has genital itching. +Reports slow stream when urinating. Denies polyuria or polydipsia. Used to have frequent urination.  Weight Change: gradual weight gain since starting insulin, currently *** lbs  SMBG (using 's Freestyle Lite sometimes, has Prodigy voicemeter): checks 4x/day Florecita 3 *** ***  Hypoglycemia: *** Hypoglycemia unawareness: ***  Current Diet: ***  Exercise: ***  PMH: DM, DKA (5/2024), HLD, HTN, SAH (5/2024), s/p cardiac cath? FHx: mom (T2DM), brother (passed from DM), uncle (cancer), cousin (cancer), denies FHx of thyroid cancer, +pancreatic head fullness Current Meds: DM meds as above, atorvastatin 40 mg daily, amlodipine, a "blue pill in the morning to help urination" Supplements: none PCP: Dr. Grant
Arm band on/IV intact/Admission wristband placed

## 2024-11-24 ENCOUNTER — INPATIENT (INPATIENT)
Facility: HOSPITAL | Age: 88
LOS: 5 days | Discharge: NURSING, REHAB OR LONG-TERM CARE | DRG: 189 | End: 2024-11-30
Attending: HOSPITALIST | Admitting: STUDENT IN AN ORGANIZED HEALTH CARE EDUCATION/TRAINING PROGRAM
Payer: MEDICARE

## 2024-11-24 VITALS
RESPIRATION RATE: 20 BRPM | DIASTOLIC BLOOD PRESSURE: 72 MMHG | HEART RATE: 118 BPM | OXYGEN SATURATION: 98 % | HEIGHT: 62 IN | TEMPERATURE: 98 F | SYSTOLIC BLOOD PRESSURE: 108 MMHG | WEIGHT: 133.6 LBS

## 2024-11-24 DIAGNOSIS — Z95.1 PRESENCE OF AORTOCORONARY BYPASS GRAFT: Chronic | ICD-10-CM

## 2024-11-24 LAB
ADD ON TEST-SPECIMEN IN LAB: SIGNIFICANT CHANGE UP
ADD ON TEST-SPECIMEN IN LAB: SIGNIFICANT CHANGE UP
ALBUMIN SERPL ELPH-MCNC: 3.7 G/DL — SIGNIFICANT CHANGE UP (ref 3.3–5)
ALP SERPL-CCNC: 53 U/L — SIGNIFICANT CHANGE UP (ref 40–120)
ALT FLD-CCNC: 28 U/L — SIGNIFICANT CHANGE UP (ref 12–78)
ANION GAP SERPL CALC-SCNC: 4 MMOL/L — LOW (ref 5–17)
APTT BLD: 31.6 SEC — SIGNIFICANT CHANGE UP (ref 24.5–35.6)
AST SERPL-CCNC: 23 U/L — SIGNIFICANT CHANGE UP (ref 15–37)
BASOPHILS # BLD AUTO: 0.06 K/UL — SIGNIFICANT CHANGE UP (ref 0–0.2)
BASOPHILS NFR BLD AUTO: 0.5 % — SIGNIFICANT CHANGE UP (ref 0–2)
BILIRUB SERPL-MCNC: 0.4 MG/DL — SIGNIFICANT CHANGE UP (ref 0.2–1.2)
BUN SERPL-MCNC: 30 MG/DL — HIGH (ref 7–23)
CALCIUM SERPL-MCNC: 9.7 MG/DL — SIGNIFICANT CHANGE UP (ref 8.5–10.1)
CHLORIDE SERPL-SCNC: 101 MMOL/L — SIGNIFICANT CHANGE UP (ref 96–108)
CO2 SERPL-SCNC: 26 MMOL/L — SIGNIFICANT CHANGE UP (ref 22–31)
CREAT SERPL-MCNC: 0.98 MG/DL — SIGNIFICANT CHANGE UP (ref 0.5–1.3)
EGFR: 54 ML/MIN/1.73M2 — LOW
EGFR: 54 ML/MIN/1.73M2 — LOW
EOSINOPHIL # BLD AUTO: 0.06 K/UL — SIGNIFICANT CHANGE UP (ref 0–0.5)
EOSINOPHIL NFR BLD AUTO: 0.5 % — SIGNIFICANT CHANGE UP (ref 0–6)
FLUAV AG NPH QL: SIGNIFICANT CHANGE UP
FLUBV AG NPH QL: SIGNIFICANT CHANGE UP
GLUCOSE SERPL-MCNC: 141 MG/DL — HIGH (ref 70–99)
HCT VFR BLD CALC: 32.1 % — LOW (ref 34.5–45)
HGB BLD-MCNC: 10.2 G/DL — LOW (ref 11.5–15.5)
IMM GRANULOCYTES NFR BLD AUTO: 0.5 % — SIGNIFICANT CHANGE UP (ref 0–0.9)
INR BLD: 0.93 RATIO — SIGNIFICANT CHANGE UP (ref 0.85–1.16)
LACTATE SERPL-SCNC: 2.1 MMOL/L — HIGH (ref 0.7–2)
LYMPHOCYTES # BLD AUTO: 1.16 K/UL — SIGNIFICANT CHANGE UP (ref 1–3.3)
LYMPHOCYTES # BLD AUTO: 9.2 % — LOW (ref 13–44)
MCHC RBC-ENTMCNC: 28.2 PG — SIGNIFICANT CHANGE UP (ref 27–34)
MCHC RBC-ENTMCNC: 31.8 G/DL — LOW (ref 32–36)
MCV RBC AUTO: 88.7 FL — SIGNIFICANT CHANGE UP (ref 80–100)
MONOCYTES # BLD AUTO: 0.6 K/UL — SIGNIFICANT CHANGE UP (ref 0–0.9)
MONOCYTES NFR BLD AUTO: 4.8 % — SIGNIFICANT CHANGE UP (ref 2–14)
NEUTROPHILS # BLD AUTO: 10.66 K/UL — HIGH (ref 1.8–7.4)
NEUTROPHILS NFR BLD AUTO: 84.5 % — HIGH (ref 43–77)
NT-PROBNP SERPL-SCNC: 8369 PG/ML — HIGH (ref 0–450)
PLATELET # BLD AUTO: 338 K/UL — SIGNIFICANT CHANGE UP (ref 150–400)
POTASSIUM SERPL-MCNC: 4.2 MMOL/L — SIGNIFICANT CHANGE UP (ref 3.5–5.3)
POTASSIUM SERPL-SCNC: 4.2 MMOL/L — SIGNIFICANT CHANGE UP (ref 3.5–5.3)
PROT SERPL-MCNC: 8.4 GM/DL — HIGH (ref 6–8.3)
PROTHROM AB SERPL-ACNC: 11 SEC — SIGNIFICANT CHANGE UP (ref 9.9–13.4)
RAPID RVP RESULT: SIGNIFICANT CHANGE UP
RBC # BLD: 3.62 M/UL — LOW (ref 3.8–5.2)
RBC # FLD: 12.5 % — SIGNIFICANT CHANGE UP (ref 10.3–14.5)
RSV RNA NPH QL NAA+NON-PROBE: SIGNIFICANT CHANGE UP
SARS-COV-2 RNA SPEC QL NAA+PROBE: SIGNIFICANT CHANGE UP
SARS-COV-2 RNA SPEC QL NAA+PROBE: SIGNIFICANT CHANGE UP
SODIUM SERPL-SCNC: 131 MMOL/L — LOW (ref 135–145)
TROPONIN I, HIGH SENSITIVITY RESULT: 263.12 NG/L — HIGH
WBC # BLD: 12.6 K/UL — HIGH (ref 3.8–10.5)
WBC # FLD AUTO: 12.6 K/UL — HIGH (ref 3.8–10.5)

## 2024-11-24 PROCEDURE — 71275 CT ANGIOGRAPHY CHEST: CPT | Mod: 26,MC

## 2024-11-24 PROCEDURE — 99285 EMERGENCY DEPT VISIT HI MDM: CPT | Mod: FS

## 2024-11-24 PROCEDURE — 93010 ELECTROCARDIOGRAM REPORT: CPT

## 2024-11-24 PROCEDURE — 71045 X-RAY EXAM CHEST 1 VIEW: CPT | Mod: 26

## 2024-11-24 RX ORDER — AZITHROMYCIN 250 MG
500 CAPSULE ORAL ONCE
Refills: 0 | Status: COMPLETED | OUTPATIENT
Start: 2024-11-24 | End: 2024-11-24

## 2024-11-24 RX ORDER — CEFTRIAXONE 500 MG/1
1000 INJECTION, POWDER, FOR SOLUTION INTRAMUSCULAR; INTRAVENOUS ONCE
Refills: 0 | Status: COMPLETED | OUTPATIENT
Start: 2024-11-24 | End: 2024-11-24

## 2024-11-24 RX ORDER — ACETAMINOPHEN 500 MG/5ML
1000 LIQUID (ML) ORAL ONCE
Refills: 0 | Status: COMPLETED | OUTPATIENT
Start: 2024-11-24 | End: 2024-11-24

## 2024-11-24 RX ORDER — METHYLPREDNISOLONE ACETATE 80 MG/ML
125 INJECTION, SUSPENSION INTRA-ARTICULAR; INTRALESIONAL; INTRAMUSCULAR; SOFT TISSUE ONCE
Refills: 0 | Status: COMPLETED | OUTPATIENT
Start: 2024-11-24 | End: 2024-11-24

## 2024-11-24 RX ADMIN — Medication 500 MILLIGRAM(S): at 20:31

## 2024-11-24 RX ADMIN — Medication 500 MILLILITER(S): at 20:30

## 2024-11-24 RX ADMIN — METHYLPREDNISOLONE ACETATE 125 MILLIGRAM(S): 80 INJECTION, SUSPENSION INTRA-ARTICULAR; INTRALESIONAL; INTRAMUSCULAR; SOFT TISSUE at 20:31

## 2024-11-24 RX ADMIN — CEFTRIAXONE 1000 MILLIGRAM(S): 500 INJECTION, POWDER, FOR SOLUTION INTRAMUSCULAR; INTRAVENOUS at 20:31

## 2024-11-24 RX ADMIN — Medication 400 MILLIGRAM(S): at 20:31

## 2024-11-25 ENCOUNTER — RESULT REVIEW (OUTPATIENT)
Age: 89
End: 2024-11-25

## 2024-11-25 DIAGNOSIS — J81.1 CHRONIC PULMONARY EDEMA: ICD-10-CM

## 2024-11-25 DIAGNOSIS — I35.0 NONRHEUMATIC AORTIC (VALVE) STENOSIS: ICD-10-CM

## 2024-11-25 LAB
ANION GAP SERPL CALC-SCNC: 4 MMOL/L — LOW (ref 5–17)
APPEARANCE UR: CLEAR — SIGNIFICANT CHANGE UP
BASOPHILS # BLD AUTO: 0.01 K/UL — SIGNIFICANT CHANGE UP (ref 0–0.2)
BASOPHILS NFR BLD AUTO: 0.1 % — SIGNIFICANT CHANGE UP (ref 0–2)
BILIRUB UR-MCNC: NEGATIVE — SIGNIFICANT CHANGE UP
BUN SERPL-MCNC: 23 MG/DL — SIGNIFICANT CHANGE UP (ref 7–23)
CALCIUM SERPL-MCNC: 9.3 MG/DL — SIGNIFICANT CHANGE UP (ref 8.5–10.1)
CHLORIDE SERPL-SCNC: 104 MMOL/L — SIGNIFICANT CHANGE UP (ref 96–108)
CO2 SERPL-SCNC: 27 MMOL/L — SIGNIFICANT CHANGE UP (ref 22–31)
COLOR SPEC: YELLOW — SIGNIFICANT CHANGE UP
CREAT SERPL-MCNC: 0.84 MG/DL — SIGNIFICANT CHANGE UP (ref 0.5–1.3)
DIFF PNL FLD: NEGATIVE — SIGNIFICANT CHANGE UP
EGFR: 66 ML/MIN/1.73M2 — SIGNIFICANT CHANGE UP
EGFR: 66 ML/MIN/1.73M2 — SIGNIFICANT CHANGE UP
EOSINOPHIL # BLD AUTO: 0 K/UL — SIGNIFICANT CHANGE UP (ref 0–0.5)
EOSINOPHIL NFR BLD AUTO: 0 % — SIGNIFICANT CHANGE UP (ref 0–6)
GLUCOSE SERPL-MCNC: 138 MG/DL — HIGH (ref 70–99)
GLUCOSE UR QL: NEGATIVE MG/DL — SIGNIFICANT CHANGE UP
HCT VFR BLD CALC: 30.4 % — LOW (ref 34.5–45)
HGB BLD-MCNC: 9.4 G/DL — LOW (ref 11.5–15.5)
IMM GRANULOCYTES NFR BLD AUTO: 0.4 % — SIGNIFICANT CHANGE UP (ref 0–0.9)
KETONES UR-MCNC: NEGATIVE MG/DL — SIGNIFICANT CHANGE UP
LACTATE SERPL-SCNC: 1 MMOL/L — SIGNIFICANT CHANGE UP (ref 0.7–2)
LEUKOCYTE ESTERASE UR-ACNC: NEGATIVE — SIGNIFICANT CHANGE UP
LYMPHOCYTES # BLD AUTO: 0.85 K/UL — LOW (ref 1–3.3)
LYMPHOCYTES # BLD AUTO: 12.4 % — LOW (ref 13–44)
MCHC RBC-ENTMCNC: 27.7 PG — SIGNIFICANT CHANGE UP (ref 27–34)
MCHC RBC-ENTMCNC: 30.9 G/DL — LOW (ref 32–36)
MCV RBC AUTO: 89.7 FL — SIGNIFICANT CHANGE UP (ref 80–100)
MONOCYTES # BLD AUTO: 0.22 K/UL — SIGNIFICANT CHANGE UP (ref 0–0.9)
MONOCYTES NFR BLD AUTO: 3.2 % — SIGNIFICANT CHANGE UP (ref 2–14)
NEUTROPHILS # BLD AUTO: 5.76 K/UL — SIGNIFICANT CHANGE UP (ref 1.8–7.4)
NEUTROPHILS NFR BLD AUTO: 83.9 % — HIGH (ref 43–77)
NITRITE UR-MCNC: NEGATIVE — SIGNIFICANT CHANGE UP
PH UR: 6 — SIGNIFICANT CHANGE UP (ref 5–8)
PLATELET # BLD AUTO: 313 K/UL — SIGNIFICANT CHANGE UP (ref 150–400)
POTASSIUM SERPL-MCNC: 4.5 MMOL/L — SIGNIFICANT CHANGE UP (ref 3.5–5.3)
POTASSIUM SERPL-SCNC: 4.5 MMOL/L — SIGNIFICANT CHANGE UP (ref 3.5–5.3)
PROT UR-MCNC: SIGNIFICANT CHANGE UP MG/DL
RBC # BLD: 3.39 M/UL — LOW (ref 3.8–5.2)
RBC # FLD: 12.6 % — SIGNIFICANT CHANGE UP (ref 10.3–14.5)
SODIUM SERPL-SCNC: 135 MMOL/L — SIGNIFICANT CHANGE UP (ref 135–145)
SP GR SPEC: 1.02 — SIGNIFICANT CHANGE UP (ref 1–1.03)
TROPONIN I, HIGH SENSITIVITY RESULT: 201.03 NG/L — HIGH
UROBILINOGEN FLD QL: 0.2 MG/DL — SIGNIFICANT CHANGE UP (ref 0.2–1)
WBC # BLD: 6.87 K/UL — SIGNIFICANT CHANGE UP (ref 3.8–10.5)
WBC # FLD AUTO: 6.87 K/UL — SIGNIFICANT CHANGE UP (ref 3.8–10.5)

## 2024-11-25 PROCEDURE — 97116 GAIT TRAINING THERAPY: CPT | Mod: GP

## 2024-11-25 PROCEDURE — 83735 ASSAY OF MAGNESIUM: CPT

## 2024-11-25 PROCEDURE — 84484 ASSAY OF TROPONIN QUANT: CPT

## 2024-11-25 PROCEDURE — 86609 BACTERIUM ANTIBODY: CPT

## 2024-11-25 PROCEDURE — 99223 1ST HOSP IP/OBS HIGH 75: CPT

## 2024-11-25 PROCEDURE — 93321 DOPPLER ECHO F-UP/LMTD STD: CPT | Mod: 26

## 2024-11-25 PROCEDURE — 85025 COMPLETE CBC W/AUTO DIFF WBC: CPT

## 2024-11-25 PROCEDURE — 86317 IMMUNOASSAY INFECTIOUS AGENT: CPT

## 2024-11-25 PROCEDURE — 80048 BASIC METABOLIC PNL TOTAL CA: CPT

## 2024-11-25 PROCEDURE — 85027 COMPLETE CBC AUTOMATED: CPT

## 2024-11-25 PROCEDURE — 94640 AIRWAY INHALATION TREATMENT: CPT

## 2024-11-25 PROCEDURE — 36415 COLL VENOUS BLD VENIPUNCTURE: CPT

## 2024-11-25 PROCEDURE — 97530 THERAPEUTIC ACTIVITIES: CPT | Mod: GP

## 2024-11-25 PROCEDURE — 93308 TTE F-UP OR LMTD: CPT | Mod: 26

## 2024-11-25 PROCEDURE — 99223 1ST HOSP IP/OBS HIGH 75: CPT | Mod: FS

## 2024-11-25 PROCEDURE — 82962 GLUCOSE BLOOD TEST: CPT

## 2024-11-25 PROCEDURE — 97163 PT EVAL HIGH COMPLEX 45 MIN: CPT | Mod: GP

## 2024-11-25 RX ORDER — MELATONIN 5 MG
3 TABLET ORAL AT BEDTIME
Refills: 0 | Status: DISCONTINUED | OUTPATIENT
Start: 2024-11-25 | End: 2024-11-30

## 2024-11-25 RX ORDER — CYST/ALA/Q10/PHOS.SER/DHA/BROC 100-20-50
1 POWDER (GRAM) ORAL
Refills: 0 | DISCHARGE

## 2024-11-25 RX ORDER — TORSEMIDE 20 MG/1
20 TABLET ORAL DAILY
Refills: 0 | Status: DISCONTINUED | OUTPATIENT
Start: 2024-11-25 | End: 2024-11-25

## 2024-11-25 RX ORDER — LEVOTHYROXINE SODIUM 300 MCG
100 TABLET ORAL DAILY
Refills: 0 | Status: DISCONTINUED | OUTPATIENT
Start: 2024-11-25 | End: 2024-11-30

## 2024-11-25 RX ORDER — METHYLPREDNISOLONE ACETATE 80 MG/ML
125 INJECTION, SUSPENSION INTRA-ARTICULAR; INTRALESIONAL; INTRAMUSCULAR; SOFT TISSUE DAILY
Refills: 0 | Status: DISCONTINUED | OUTPATIENT
Start: 2024-11-25 | End: 2024-11-25

## 2024-11-25 RX ORDER — AZITHROMYCIN 250 MG
500 CAPSULE ORAL EVERY 24 HOURS
Refills: 0 | Status: DISCONTINUED | OUTPATIENT
Start: 2024-11-25 | End: 2024-11-27

## 2024-11-25 RX ORDER — LANOLIN/MINERAL OIL/PETROLATUM
1 OINTMENT (GRAM) OPHTHALMIC (EYE)
Refills: 0 | DISCHARGE

## 2024-11-25 RX ORDER — MAGNESIUM, ALUMINUM HYDROXIDE 200-200 MG
30 TABLET,CHEWABLE ORAL EVERY 4 HOURS
Refills: 0 | Status: DISCONTINUED | OUTPATIENT
Start: 2024-11-25 | End: 2024-11-30

## 2024-11-25 RX ORDER — ATORVASTATIN CALCIUM 80 MG/1
10 TABLET, FILM COATED ORAL AT BEDTIME
Refills: 0 | Status: DISCONTINUED | OUTPATIENT
Start: 2024-11-25 | End: 2024-11-30

## 2024-11-25 RX ORDER — CYST/ALA/Q10/PHOS.SER/DHA/BROC 100-20-50
1 POWDER (GRAM) ORAL DAILY
Refills: 0 | Status: DISCONTINUED | OUTPATIENT
Start: 2024-11-25 | End: 2024-11-30

## 2024-11-25 RX ORDER — CEFTRIAXONE 500 MG/1
1000 INJECTION, POWDER, FOR SOLUTION INTRAMUSCULAR; INTRAVENOUS EVERY 24 HOURS
Refills: 0 | Status: COMPLETED | OUTPATIENT
Start: 2024-11-25 | End: 2024-11-29

## 2024-11-25 RX ORDER — TOUCHLESS CARE ZINC OXIDE PROTECTANT 20; 25 G/100G; G/100G
0 SPRAY TOPICAL
Refills: 0 | DISCHARGE

## 2024-11-25 RX ORDER — IPRATROPIUM BROMIDE AND ALBUTEROL SULFATE .5; 2.5 MG/3ML; MG/3ML
3 SOLUTION RESPIRATORY (INHALATION) EVERY 6 HOURS
Refills: 0 | Status: DISCONTINUED | OUTPATIENT
Start: 2024-11-25 | End: 2024-11-30

## 2024-11-25 RX ORDER — CALCIUM CARBONATE 750 MG/1
1 TABLET ORAL
Refills: 0 | DISCHARGE

## 2024-11-25 RX ORDER — HEPARIN SODIUM 1000 [USP'U]/ML
5000 INJECTION INTRAVENOUS; SUBCUTANEOUS EVERY 12 HOURS
Refills: 0 | Status: DISCONTINUED | OUTPATIENT
Start: 2024-11-25 | End: 2024-11-25

## 2024-11-25 RX ORDER — LEVOTHYROXINE SODIUM 300 MCG
1 TABLET ORAL
Refills: 0 | DISCHARGE

## 2024-11-25 RX ORDER — ASPIRIN 325 MG
2 TABLET ORAL
Refills: 0 | DISCHARGE

## 2024-11-25 RX ORDER — CEFTRIAXONE 500 MG/1
1000 INJECTION, POWDER, FOR SOLUTION INTRAMUSCULAR; INTRAVENOUS EVERY 24 HOURS
Refills: 0 | Status: DISCONTINUED | OUTPATIENT
Start: 2024-11-25 | End: 2024-11-25

## 2024-11-25 RX ORDER — ASPIRIN 325 MG
81 TABLET ORAL DAILY
Refills: 0 | Status: DISCONTINUED | OUTPATIENT
Start: 2024-11-25 | End: 2024-11-30

## 2024-11-25 RX ORDER — ALBUTEROL SULFATE 2.5 MG/3ML
2 VIAL, NEBULIZER (ML) INHALATION
Refills: 0 | DISCHARGE

## 2024-11-25 RX ORDER — PREDNISONE 20 MG/1
40 TABLET ORAL DAILY
Refills: 0 | Status: DISCONTINUED | OUTPATIENT
Start: 2024-11-25 | End: 2024-11-26

## 2024-11-25 RX ORDER — ONDANSETRON HCL/PF 4 MG/2 ML
4 VIAL (ML) INJECTION EVERY 6 HOURS
Refills: 0 | Status: DISCONTINUED | OUTPATIENT
Start: 2024-11-25 | End: 2024-11-30

## 2024-11-25 RX ORDER — DEXTROMETHORPHAN HBR, GUAIFENESIN 200 MG/10ML
15 LIQUID ORAL
Refills: 0 | DISCHARGE

## 2024-11-25 RX ORDER — LORATADINE 5 MG/5ML
1 SOLUTION ORAL
Refills: 0 | DISCHARGE

## 2024-11-25 RX ORDER — SUCRALFATE 1 G
1 TABLET ORAL
Refills: 0 | Status: DISCONTINUED | OUTPATIENT
Start: 2024-11-25 | End: 2024-11-30

## 2024-11-25 RX ORDER — ACETAMINOPHEN 500 MG/5ML
650 LIQUID (ML) ORAL EVERY 6 HOURS
Refills: 0 | Status: DISCONTINUED | OUTPATIENT
Start: 2024-11-25 | End: 2024-11-30

## 2024-11-25 RX ORDER — TORSEMIDE 20 MG/1
1 TABLET ORAL
Refills: 0 | DISCHARGE

## 2024-11-25 RX ORDER — METOPROLOL SUCCINATE 50 MG/1
25 TABLET, EXTENDED RELEASE ORAL DAILY
Refills: 0 | Status: DISCONTINUED | OUTPATIENT
Start: 2024-11-25 | End: 2024-11-30

## 2024-11-25 RX ORDER — FUROSEMIDE 10 MG/ML
40 INJECTION INTRAMUSCULAR; INTRAVENOUS ONCE
Refills: 0 | Status: COMPLETED | OUTPATIENT
Start: 2024-11-25 | End: 2024-11-25

## 2024-11-25 RX ORDER — FUROSEMIDE 10 MG/ML
20 INJECTION INTRAMUSCULAR; INTRAVENOUS DAILY
Refills: 0 | Status: DISCONTINUED | OUTPATIENT
Start: 2024-11-25 | End: 2024-11-29

## 2024-11-25 RX ADMIN — ATORVASTATIN CALCIUM 10 MILLIGRAM(S): 80 TABLET, FILM COATED ORAL at 21:31

## 2024-11-25 RX ADMIN — IPRATROPIUM BROMIDE AND ALBUTEROL SULFATE 3 MILLILITER(S): .5; 2.5 SOLUTION RESPIRATORY (INHALATION) at 21:11

## 2024-11-25 RX ADMIN — Medication 1 TABLET(S): at 16:03

## 2024-11-25 RX ADMIN — Medication 1 DOSE(S): at 22:20

## 2024-11-25 RX ADMIN — IPRATROPIUM BROMIDE AND ALBUTEROL SULFATE 3 MILLILITER(S): .5; 2.5 SOLUTION RESPIRATORY (INHALATION) at 13:22

## 2024-11-25 RX ADMIN — Medication 100 MICROGRAM(S): at 10:50

## 2024-11-25 RX ADMIN — FUROSEMIDE 20 MILLIGRAM(S): 10 INJECTION INTRAMUSCULAR; INTRAVENOUS at 14:15

## 2024-11-25 RX ADMIN — Medication 1 GRAM(S): at 10:50

## 2024-11-25 RX ADMIN — PREDNISONE 40 MILLIGRAM(S): 20 TABLET ORAL at 10:50

## 2024-11-25 RX ADMIN — Medication 255 MILLIGRAM(S): at 23:17

## 2024-11-25 RX ADMIN — CEFTRIAXONE 1000 MILLIGRAM(S): 500 INJECTION, POWDER, FOR SOLUTION INTRAMUSCULAR; INTRAVENOUS at 20:53

## 2024-11-25 RX ADMIN — IPRATROPIUM BROMIDE AND ALBUTEROL SULFATE 3 MILLILITER(S): .5; 2.5 SOLUTION RESPIRATORY (INHALATION) at 09:19

## 2024-11-25 RX ADMIN — METOPROLOL SUCCINATE 25 MILLIGRAM(S): 50 TABLET, EXTENDED RELEASE ORAL at 14:15

## 2024-11-25 RX ADMIN — Medication 81 MILLIGRAM(S): at 21:31

## 2024-11-26 DIAGNOSIS — I48.20 CHRONIC ATRIAL FIBRILLATION, UNSPECIFIED: ICD-10-CM

## 2024-11-26 DIAGNOSIS — R79.89 OTHER SPECIFIED ABNORMAL FINDINGS OF BLOOD CHEMISTRY: ICD-10-CM

## 2024-11-26 LAB
ANION GAP SERPL CALC-SCNC: 3 MMOL/L — LOW (ref 5–17)
BUN SERPL-MCNC: 22 MG/DL — SIGNIFICANT CHANGE UP (ref 7–23)
CALCIUM SERPL-MCNC: 9.1 MG/DL — SIGNIFICANT CHANGE UP (ref 8.5–10.1)
CHLORIDE SERPL-SCNC: 103 MMOL/L — SIGNIFICANT CHANGE UP (ref 96–108)
CO2 SERPL-SCNC: 27 MMOL/L — SIGNIFICANT CHANGE UP (ref 22–31)
CREAT SERPL-MCNC: 0.81 MG/DL — SIGNIFICANT CHANGE UP (ref 0.5–1.3)
EGFR: 68 ML/MIN/1.73M2 — SIGNIFICANT CHANGE UP
EGFR: 68 ML/MIN/1.73M2 — SIGNIFICANT CHANGE UP
GLUCOSE SERPL-MCNC: 96 MG/DL — SIGNIFICANT CHANGE UP (ref 70–99)
POTASSIUM SERPL-MCNC: 3.9 MMOL/L — SIGNIFICANT CHANGE UP (ref 3.5–5.3)
POTASSIUM SERPL-SCNC: 3.9 MMOL/L — SIGNIFICANT CHANGE UP (ref 3.5–5.3)
SODIUM SERPL-SCNC: 133 MMOL/L — LOW (ref 135–145)

## 2024-11-26 PROCEDURE — 99233 SBSQ HOSP IP/OBS HIGH 50: CPT | Mod: FS

## 2024-11-26 PROCEDURE — 99233 SBSQ HOSP IP/OBS HIGH 50: CPT

## 2024-11-26 RX ORDER — IPRATROPIUM BROMIDE AND ALBUTEROL SULFATE .5; 2.5 MG/3ML; MG/3ML
3 SOLUTION RESPIRATORY (INHALATION) ONCE
Refills: 0 | Status: COMPLETED | OUTPATIENT
Start: 2024-11-26 | End: 2024-11-26

## 2024-11-26 RX ORDER — METHYLPREDNISOLONE ACETATE 80 MG/ML
40 INJECTION, SUSPENSION INTRA-ARTICULAR; INTRALESIONAL; INTRAMUSCULAR; SOFT TISSUE EVERY 8 HOURS
Refills: 0 | Status: DISCONTINUED | OUTPATIENT
Start: 2024-11-26 | End: 2024-11-27

## 2024-11-26 RX ADMIN — METHYLPREDNISOLONE ACETATE 40 MILLIGRAM(S): 80 INJECTION, SUSPENSION INTRA-ARTICULAR; INTRALESIONAL; INTRAMUSCULAR; SOFT TISSUE at 10:37

## 2024-11-26 RX ADMIN — Medication 1 DOSE(S): at 07:54

## 2024-11-26 RX ADMIN — Medication 255 MILLIGRAM(S): at 21:06

## 2024-11-26 RX ADMIN — Medication 81 MILLIGRAM(S): at 09:45

## 2024-11-26 RX ADMIN — METHYLPREDNISOLONE ACETATE 40 MILLIGRAM(S): 80 INJECTION, SUSPENSION INTRA-ARTICULAR; INTRALESIONAL; INTRAMUSCULAR; SOFT TISSUE at 21:59

## 2024-11-26 RX ADMIN — FUROSEMIDE 20 MILLIGRAM(S): 10 INJECTION INTRAMUSCULAR; INTRAVENOUS at 09:43

## 2024-11-26 RX ADMIN — Medication 1 TABLET(S): at 09:44

## 2024-11-26 RX ADMIN — IPRATROPIUM BROMIDE AND ALBUTEROL SULFATE 3 MILLILITER(S): .5; 2.5 SOLUTION RESPIRATORY (INHALATION) at 01:57

## 2024-11-26 RX ADMIN — Medication 1 GRAM(S): at 09:44

## 2024-11-26 RX ADMIN — Medication 1 DOSE(S): at 20:50

## 2024-11-26 RX ADMIN — IPRATROPIUM BROMIDE AND ALBUTEROL SULFATE 3 MILLILITER(S): .5; 2.5 SOLUTION RESPIRATORY (INHALATION) at 20:50

## 2024-11-26 RX ADMIN — Medication 100 MICROGRAM(S): at 05:13

## 2024-11-26 RX ADMIN — CEFTRIAXONE 1000 MILLIGRAM(S): 500 INJECTION, POWDER, FOR SOLUTION INTRAMUSCULAR; INTRAVENOUS at 21:00

## 2024-11-26 RX ADMIN — METOPROLOL SUCCINATE 25 MILLIGRAM(S): 50 TABLET, EXTENDED RELEASE ORAL at 09:45

## 2024-11-26 RX ADMIN — PREDNISONE 40 MILLIGRAM(S): 20 TABLET ORAL at 09:43

## 2024-11-26 RX ADMIN — IPRATROPIUM BROMIDE AND ALBUTEROL SULFATE 3 MILLILITER(S): .5; 2.5 SOLUTION RESPIRATORY (INHALATION) at 13:33

## 2024-11-26 RX ADMIN — ATORVASTATIN CALCIUM 10 MILLIGRAM(S): 80 TABLET, FILM COATED ORAL at 21:59

## 2024-11-26 RX ADMIN — IPRATROPIUM BROMIDE AND ALBUTEROL SULFATE 3 MILLILITER(S): .5; 2.5 SOLUTION RESPIRATORY (INHALATION) at 07:52

## 2024-11-27 LAB
ANION GAP SERPL CALC-SCNC: 3 MMOL/L — LOW (ref 5–17)
BUN SERPL-MCNC: 23 MG/DL — SIGNIFICANT CHANGE UP (ref 7–23)
CALCIUM SERPL-MCNC: 8.7 MG/DL — SIGNIFICANT CHANGE UP (ref 8.5–10.1)
CHLORIDE SERPL-SCNC: 104 MMOL/L — SIGNIFICANT CHANGE UP (ref 96–108)
CO2 SERPL-SCNC: 26 MMOL/L — SIGNIFICANT CHANGE UP (ref 22–31)
CREAT SERPL-MCNC: 0.64 MG/DL — SIGNIFICANT CHANGE UP (ref 0.5–1.3)
EGFR: 83 ML/MIN/1.73M2 — SIGNIFICANT CHANGE UP
EGFR: 83 ML/MIN/1.73M2 — SIGNIFICANT CHANGE UP
GLUCOSE SERPL-MCNC: 131 MG/DL — HIGH (ref 70–99)
HCT VFR BLD CALC: 29 % — LOW (ref 34.5–45)
HGB BLD-MCNC: 9 G/DL — LOW (ref 11.5–15.5)
MCHC RBC-ENTMCNC: 28 PG — SIGNIFICANT CHANGE UP (ref 27–34)
MCHC RBC-ENTMCNC: 31 G/DL — LOW (ref 32–36)
MCV RBC AUTO: 90.1 FL — SIGNIFICANT CHANGE UP (ref 80–100)
PLATELET # BLD AUTO: 274 K/UL — SIGNIFICANT CHANGE UP (ref 150–400)
POTASSIUM SERPL-MCNC: 3.7 MMOL/L — SIGNIFICANT CHANGE UP (ref 3.5–5.3)
POTASSIUM SERPL-SCNC: 3.7 MMOL/L — SIGNIFICANT CHANGE UP (ref 3.5–5.3)
RBC # BLD: 3.22 M/UL — LOW (ref 3.8–5.2)
RBC # FLD: 12.4 % — SIGNIFICANT CHANGE UP (ref 10.3–14.5)
SODIUM SERPL-SCNC: 133 MMOL/L — LOW (ref 135–145)
WBC # BLD: 10.54 K/UL — HIGH (ref 3.8–10.5)
WBC # FLD AUTO: 10.54 K/UL — HIGH (ref 3.8–10.5)

## 2024-11-27 PROCEDURE — 99233 SBSQ HOSP IP/OBS HIGH 50: CPT

## 2024-11-27 PROCEDURE — 99233 SBSQ HOSP IP/OBS HIGH 50: CPT | Mod: FS

## 2024-11-27 RX ORDER — METHYLPREDNISOLONE ACETATE 80 MG/ML
40 INJECTION, SUSPENSION INTRA-ARTICULAR; INTRALESIONAL; INTRAMUSCULAR; SOFT TISSUE EVERY 24 HOURS
Refills: 0 | Status: DISCONTINUED | OUTPATIENT
Start: 2024-11-28 | End: 2024-11-29

## 2024-11-27 RX ORDER — TIOTROPIUM BROMIDE INHALATION SPRAY 3.12 UG/1
2 SPRAY, METERED RESPIRATORY (INHALATION) DAILY
Refills: 0 | Status: DISCONTINUED | OUTPATIENT
Start: 2024-11-27 | End: 2024-11-30

## 2024-11-27 RX ADMIN — Medication 1 TABLET(S): at 12:33

## 2024-11-27 RX ADMIN — METHYLPREDNISOLONE ACETATE 40 MILLIGRAM(S): 80 INJECTION, SUSPENSION INTRA-ARTICULAR; INTRALESIONAL; INTRAMUSCULAR; SOFT TISSUE at 14:56

## 2024-11-27 RX ADMIN — Medication 1 DOSE(S): at 20:53

## 2024-11-27 RX ADMIN — Medication 100 MICROGRAM(S): at 05:45

## 2024-11-27 RX ADMIN — CEFTRIAXONE 1000 MILLIGRAM(S): 500 INJECTION, POWDER, FOR SOLUTION INTRAMUSCULAR; INTRAVENOUS at 22:34

## 2024-11-27 RX ADMIN — IPRATROPIUM BROMIDE AND ALBUTEROL SULFATE 3 MILLILITER(S): .5; 2.5 SOLUTION RESPIRATORY (INHALATION) at 16:13

## 2024-11-27 RX ADMIN — Medication 1 DOSE(S): at 09:51

## 2024-11-27 RX ADMIN — ATORVASTATIN CALCIUM 10 MILLIGRAM(S): 80 TABLET, FILM COATED ORAL at 22:34

## 2024-11-27 RX ADMIN — METHYLPREDNISOLONE ACETATE 40 MILLIGRAM(S): 80 INJECTION, SUSPENSION INTRA-ARTICULAR; INTRALESIONAL; INTRAMUSCULAR; SOFT TISSUE at 05:45

## 2024-11-27 RX ADMIN — Medication 1 GRAM(S): at 12:33

## 2024-11-27 RX ADMIN — METOPROLOL SUCCINATE 25 MILLIGRAM(S): 50 TABLET, EXTENDED RELEASE ORAL at 12:33

## 2024-11-27 RX ADMIN — Medication 81 MILLIGRAM(S): at 12:33

## 2024-11-27 RX ADMIN — IPRATROPIUM BROMIDE AND ALBUTEROL SULFATE 3 MILLILITER(S): .5; 2.5 SOLUTION RESPIRATORY (INHALATION) at 01:09

## 2024-11-27 RX ADMIN — IPRATROPIUM BROMIDE AND ALBUTEROL SULFATE 3 MILLILITER(S): .5; 2.5 SOLUTION RESPIRATORY (INHALATION) at 09:51

## 2024-11-27 RX ADMIN — FUROSEMIDE 20 MILLIGRAM(S): 10 INJECTION INTRAMUSCULAR; INTRAVENOUS at 12:33

## 2024-11-28 LAB
ANION GAP SERPL CALC-SCNC: 3 MMOL/L — LOW (ref 5–17)
BUN SERPL-MCNC: 37 MG/DL — HIGH (ref 7–23)
CALCIUM SERPL-MCNC: 8.6 MG/DL — SIGNIFICANT CHANGE UP (ref 8.5–10.1)
CHLORIDE SERPL-SCNC: 105 MMOL/L — SIGNIFICANT CHANGE UP (ref 96–108)
CO2 SERPL-SCNC: 27 MMOL/L — SIGNIFICANT CHANGE UP (ref 22–31)
CREAT SERPL-MCNC: 0.83 MG/DL — SIGNIFICANT CHANGE UP (ref 0.5–1.3)
EGFR: 67 ML/MIN/1.73M2 — SIGNIFICANT CHANGE UP
EGFR: 67 ML/MIN/1.73M2 — SIGNIFICANT CHANGE UP
GLUCOSE SERPL-MCNC: 109 MG/DL — HIGH (ref 70–99)
POTASSIUM SERPL-MCNC: 3.7 MMOL/L — SIGNIFICANT CHANGE UP (ref 3.5–5.3)
POTASSIUM SERPL-SCNC: 3.7 MMOL/L — SIGNIFICANT CHANGE UP (ref 3.5–5.3)
SODIUM SERPL-SCNC: 135 MMOL/L — SIGNIFICANT CHANGE UP (ref 135–145)

## 2024-11-28 PROCEDURE — 99233 SBSQ HOSP IP/OBS HIGH 50: CPT

## 2024-11-28 RX ORDER — DEXTROMETHORPHAN HBR, GUAIFENESIN 200 MG/10ML
600 LIQUID ORAL EVERY 12 HOURS
Refills: 0 | Status: DISCONTINUED | OUTPATIENT
Start: 2024-11-28 | End: 2024-11-30

## 2024-11-28 RX ADMIN — METHYLPREDNISOLONE ACETATE 40 MILLIGRAM(S): 80 INJECTION, SUSPENSION INTRA-ARTICULAR; INTRALESIONAL; INTRAMUSCULAR; SOFT TISSUE at 06:23

## 2024-11-28 RX ADMIN — Medication 1 DOSE(S): at 19:49

## 2024-11-28 RX ADMIN — Medication 100 MICROGRAM(S): at 06:24

## 2024-11-28 RX ADMIN — ATORVASTATIN CALCIUM 10 MILLIGRAM(S): 80 TABLET, FILM COATED ORAL at 22:13

## 2024-11-28 RX ADMIN — DEXTROMETHORPHAN HBR, GUAIFENESIN 600 MILLIGRAM(S): 200 LIQUID ORAL at 12:29

## 2024-11-28 RX ADMIN — TIOTROPIUM BROMIDE INHALATION SPRAY 2 PUFF(S): 3.12 SPRAY, METERED RESPIRATORY (INHALATION) at 09:26

## 2024-11-28 RX ADMIN — Medication 1 GRAM(S): at 11:15

## 2024-11-28 RX ADMIN — Medication 1 TABLET(S): at 09:45

## 2024-11-28 RX ADMIN — CEFTRIAXONE 1000 MILLIGRAM(S): 500 INJECTION, POWDER, FOR SOLUTION INTRAMUSCULAR; INTRAVENOUS at 22:13

## 2024-11-28 RX ADMIN — Medication 1 DOSE(S): at 09:24

## 2024-11-28 RX ADMIN — Medication 81 MILLIGRAM(S): at 09:45

## 2024-11-28 RX ADMIN — Medication 100 MILLIGRAM(S): at 09:46

## 2024-11-28 RX ADMIN — DEXTROMETHORPHAN HBR, GUAIFENESIN 600 MILLIGRAM(S): 200 LIQUID ORAL at 22:13

## 2024-11-28 RX ADMIN — FUROSEMIDE 20 MILLIGRAM(S): 10 INJECTION INTRAMUSCULAR; INTRAVENOUS at 09:43

## 2024-11-28 RX ADMIN — METOPROLOL SUCCINATE 25 MILLIGRAM(S): 50 TABLET, EXTENDED RELEASE ORAL at 09:44

## 2024-11-29 LAB
ANION GAP SERPL CALC-SCNC: 5 MMOL/L — SIGNIFICANT CHANGE UP (ref 5–17)
BUN SERPL-MCNC: 37 MG/DL — HIGH (ref 7–23)
CALCIUM SERPL-MCNC: 7.9 MG/DL — LOW (ref 8.5–10.1)
CHLORIDE SERPL-SCNC: 103 MMOL/L — SIGNIFICANT CHANGE UP (ref 96–108)
CO2 SERPL-SCNC: 25 MMOL/L — SIGNIFICANT CHANGE UP (ref 22–31)
CREAT SERPL-MCNC: 0.7 MG/DL — SIGNIFICANT CHANGE UP (ref 0.5–1.3)
EGFR: 82 ML/MIN/1.73M2 — SIGNIFICANT CHANGE UP
EGFR: 82 ML/MIN/1.73M2 — SIGNIFICANT CHANGE UP
GLUCOSE SERPL-MCNC: 90 MG/DL — SIGNIFICANT CHANGE UP (ref 70–99)
MAGNESIUM SERPL-MCNC: 2.4 MG/DL — SIGNIFICANT CHANGE UP (ref 1.6–2.6)
POTASSIUM SERPL-MCNC: 4 MMOL/L — SIGNIFICANT CHANGE UP (ref 3.5–5.3)
POTASSIUM SERPL-SCNC: 4 MMOL/L — SIGNIFICANT CHANGE UP (ref 3.5–5.3)
SODIUM SERPL-SCNC: 133 MMOL/L — LOW (ref 135–145)

## 2024-11-29 PROCEDURE — 99233 SBSQ HOSP IP/OBS HIGH 50: CPT

## 2024-11-29 RX ORDER — TORSEMIDE 20 MG/1
10 TABLET ORAL
Refills: 0 | Status: DISCONTINUED | OUTPATIENT
Start: 2024-11-29 | End: 2024-11-30

## 2024-11-29 RX ORDER — PREDNISONE 20 MG/1
40 TABLET ORAL DAILY
Refills: 0 | Status: DISCONTINUED | OUTPATIENT
Start: 2024-11-29 | End: 2024-11-30

## 2024-11-29 RX ADMIN — Medication 81 MILLIGRAM(S): at 21:15

## 2024-11-29 RX ADMIN — Medication 1 GRAM(S): at 00:14

## 2024-11-29 RX ADMIN — METOPROLOL SUCCINATE 25 MILLIGRAM(S): 50 TABLET, EXTENDED RELEASE ORAL at 09:00

## 2024-11-29 RX ADMIN — Medication 1 GRAM(S): at 10:26

## 2024-11-29 RX ADMIN — Medication 1 TABLET(S): at 09:01

## 2024-11-29 RX ADMIN — TIOTROPIUM BROMIDE INHALATION SPRAY 2 PUFF(S): 3.12 SPRAY, METERED RESPIRATORY (INHALATION) at 08:26

## 2024-11-29 RX ADMIN — ATORVASTATIN CALCIUM 10 MILLIGRAM(S): 80 TABLET, FILM COATED ORAL at 21:15

## 2024-11-29 RX ADMIN — TORSEMIDE 10 MILLIGRAM(S): 20 TABLET ORAL at 22:16

## 2024-11-29 RX ADMIN — PREDNISONE 40 MILLIGRAM(S): 20 TABLET ORAL at 10:26

## 2024-11-29 RX ADMIN — Medication 1 DOSE(S): at 08:26

## 2024-11-29 RX ADMIN — DEXTROMETHORPHAN HBR, GUAIFENESIN 600 MILLIGRAM(S): 200 LIQUID ORAL at 21:15

## 2024-11-29 RX ADMIN — Medication 1 DOSE(S): at 21:20

## 2024-11-29 RX ADMIN — Medication 1 GRAM(S): at 21:15

## 2024-11-29 RX ADMIN — Medication 100 MILLIGRAM(S): at 09:01

## 2024-11-29 RX ADMIN — IPRATROPIUM BROMIDE AND ALBUTEROL SULFATE 3 MILLILITER(S): .5; 2.5 SOLUTION RESPIRATORY (INHALATION) at 01:29

## 2024-11-29 RX ADMIN — DEXTROMETHORPHAN HBR, GUAIFENESIN 600 MILLIGRAM(S): 200 LIQUID ORAL at 09:01

## 2024-11-29 RX ADMIN — METHYLPREDNISOLONE ACETATE 40 MILLIGRAM(S): 80 INJECTION, SUSPENSION INTRA-ARTICULAR; INTRALESIONAL; INTRAMUSCULAR; SOFT TISSUE at 06:14

## 2024-11-29 RX ADMIN — CEFTRIAXONE 1000 MILLIGRAM(S): 500 INJECTION, POWDER, FOR SOLUTION INTRAMUSCULAR; INTRAVENOUS at 21:00

## 2024-11-29 RX ADMIN — FUROSEMIDE 20 MILLIGRAM(S): 10 INJECTION INTRAMUSCULAR; INTRAVENOUS at 09:00

## 2024-11-29 RX ADMIN — Medication 100 MICROGRAM(S): at 06:13

## 2024-11-30 ENCOUNTER — TRANSCRIPTION ENCOUNTER (OUTPATIENT)
Age: 89
End: 2024-11-30

## 2024-11-30 VITALS
TEMPERATURE: 98 F | HEART RATE: 73 BPM | RESPIRATION RATE: 18 BRPM | DIASTOLIC BLOOD PRESSURE: 74 MMHG | SYSTOLIC BLOOD PRESSURE: 121 MMHG | OXYGEN SATURATION: 96 %

## 2024-11-30 LAB
CULTURE RESULTS: SIGNIFICANT CHANGE UP
CULTURE RESULTS: SIGNIFICANT CHANGE UP
SPECIMEN SOURCE: SIGNIFICANT CHANGE UP
SPECIMEN SOURCE: SIGNIFICANT CHANGE UP

## 2024-11-30 PROCEDURE — 99239 HOSP IP/OBS DSCHRG MGMT >30: CPT

## 2024-11-30 RX ORDER — PREDNISONE 20 MG/1
2 TABLET ORAL
Qty: 0 | Refills: 0 | DISCHARGE
Start: 2024-11-30

## 2024-11-30 RX ORDER — METOPROLOL SUCCINATE 50 MG/1
1 TABLET, EXTENDED RELEASE ORAL
Qty: 0 | Refills: 0 | DISCHARGE
Start: 2024-11-30

## 2024-11-30 RX ORDER — LOSARTAN POTASSIUM 100 MG/1
12.5 TABLET, FILM COATED ORAL
Qty: 0 | Refills: 0 | DISCHARGE
Start: 2024-11-30

## 2024-11-30 RX ORDER — SUCRALFATE 1 G
1 TABLET ORAL
Qty: 0 | Refills: 0 | DISCHARGE

## 2024-11-30 RX ORDER — LOSARTAN POTASSIUM 100 MG/1
12.5 TABLET, FILM COATED ORAL DAILY
Refills: 0 | Status: DISCONTINUED | OUTPATIENT
Start: 2024-11-30 | End: 2024-11-30

## 2024-11-30 RX ORDER — METOPROLOL SUCCINATE 50 MG/1
2 TABLET, EXTENDED RELEASE ORAL
Refills: 0 | DISCHARGE

## 2024-11-30 RX ORDER — MEDPURA VITAMIN A AND D 95 G/100G
1 OINTMENT TOPICAL
Qty: 0 | Refills: 0 | DISCHARGE

## 2024-11-30 RX ORDER — CLOTRIMAZOLE 1 G/100G
0 CREAM TOPICAL
Refills: 0 | DISCHARGE

## 2024-11-30 RX ORDER — LOSARTAN POTASSIUM 100 MG/1
0.5 TABLET, FILM COATED ORAL
Qty: 16 | Refills: 0
Start: 2024-11-30

## 2024-11-30 RX ORDER — ACETAMINOPHEN 500 MG/5ML
2 LIQUID (ML) ORAL
Qty: 0 | Refills: 0 | DISCHARGE

## 2024-11-30 RX ORDER — TIOTROPIUM BROMIDE INHALATION SPRAY 3.12 UG/1
2 SPRAY, METERED RESPIRATORY (INHALATION)
Qty: 0 | Refills: 0 | DISCHARGE
Start: 2024-11-30

## 2024-11-30 RX ADMIN — Medication 100 MICROGRAM(S): at 05:53

## 2024-11-30 RX ADMIN — Medication 1 DOSE(S): at 08:03

## 2024-11-30 RX ADMIN — DEXTROMETHORPHAN HBR, GUAIFENESIN 600 MILLIGRAM(S): 200 LIQUID ORAL at 09:02

## 2024-11-30 RX ADMIN — METOPROLOL SUCCINATE 25 MILLIGRAM(S): 50 TABLET, EXTENDED RELEASE ORAL at 09:02

## 2024-11-30 RX ADMIN — Medication 100 MILLIGRAM(S): at 09:02

## 2024-11-30 RX ADMIN — TIOTROPIUM BROMIDE INHALATION SPRAY 2 PUFF(S): 3.12 SPRAY, METERED RESPIRATORY (INHALATION) at 08:04

## 2024-11-30 RX ADMIN — Medication 1 GRAM(S): at 10:54

## 2024-11-30 RX ADMIN — PREDNISONE 40 MILLIGRAM(S): 20 TABLET ORAL at 09:02

## 2024-11-30 RX ADMIN — Medication 1 TABLET(S): at 09:02

## 2024-12-03 LAB — GLUCOSE BLDC GLUCOMTR-MCNC: 198 MG/DL — HIGH (ref 70–99)

## 2024-12-04 ENCOUNTER — TRANSCRIPTION ENCOUNTER (OUTPATIENT)
Age: 88
End: 2024-12-04

## 2024-12-04 LAB
DEPRECATED S PNEUM 1 IGG SER-MCNC: 2.1 MCG/ML — SIGNIFICANT CHANGE UP
DEPRECATED S PNEUM12 IGG SER-MCNC: 0.2 MCG/ML — SIGNIFICANT CHANGE UP
DEPRECATED S PNEUM14 IGG SER-MCNC: 0.8 MCG/ML — SIGNIFICANT CHANGE UP
DEPRECATED S PNEUM17 IGG SER-MCNC: 0.6 MCG/ML — SIGNIFICANT CHANGE UP
DEPRECATED S PNEUM19 IGG SER-MCNC: 0.2 MCG/ML — SIGNIFICANT CHANGE UP
DEPRECATED S PNEUM19 IGG SER-MCNC: 0.5 MCG/ML — SIGNIFICANT CHANGE UP
DEPRECATED S PNEUM2 IGG SER-MCNC: 7.3 MCG/ML — SIGNIFICANT CHANGE UP
DEPRECATED S PNEUM20 IGG SER-MCNC: 0.4 MCG/ML — SIGNIFICANT CHANGE UP
DEPRECATED S PNEUM22 IGG SER-MCNC: 0.1 MCG/ML — SIGNIFICANT CHANGE UP
DEPRECATED S PNEUM23 IGG SER-MCNC: 0.1 MCG/ML — SIGNIFICANT CHANGE UP
DEPRECATED S PNEUM3 IGG SER-MCNC: 0.1 MCG/ML — SIGNIFICANT CHANGE UP
DEPRECATED S PNEUM4 IGG SER-MCNC: 0.5 MCG/ML — SIGNIFICANT CHANGE UP
DEPRECATED S PNEUM5 IGG SER-MCNC: 0.1 MCG/ML — SIGNIFICANT CHANGE UP
DEPRECATED S PNEUM8 IGG SER-MCNC: 0.3 MCG/ML — SIGNIFICANT CHANGE UP
DEPRECATED S PNEUM9 IGG SER-MCNC: 1.3 MCG/ML — SIGNIFICANT CHANGE UP
DEPRECATED S PNEUM9 IGG SER-MCNC: 3.4 MCG/ML — SIGNIFICANT CHANGE UP
IMMUNOLOGIST REVIEW: SIGNIFICANT CHANGE UP
S PNEUM SEROTYPE IGG SER-IMP: 0.2 MCG/ML — SIGNIFICANT CHANGE UP
S PNEUM SEROTYPE IGG SER-IMP: 0.2 MCG/ML — SIGNIFICANT CHANGE UP
S PNEUM SEROTYPE IGG SER-IMP: 0.3 MCG/ML — SIGNIFICANT CHANGE UP
S PNEUM SEROTYPE IGG SER-IMP: 0.3 MCG/ML — SIGNIFICANT CHANGE UP
S PNEUM SEROTYPE IGG SER-IMP: 0.4 MCG/ML — SIGNIFICANT CHANGE UP
S PNEUM SEROTYPE IGG SER-IMP: 1.5 MCG/ML — SIGNIFICANT CHANGE UP
S PNEUM SEROTYPE IGG SER-IMP: SIGNIFICANT CHANGE UP MCG/ML
STREPTOCOCCUS PNEUMONIAE IGG SEROTYPE INTERPRETATION: SIGNIFICANT CHANGE UP

## 2024-12-05 ENCOUNTER — APPOINTMENT (OUTPATIENT)
Dept: CARDIOLOGY | Facility: CLINIC | Age: 88
End: 2024-12-05

## 2024-12-09 DIAGNOSIS — K44.9 DIAPHRAGMATIC HERNIA WITHOUT OBSTRUCTION OR GANGRENE: ICD-10-CM

## 2024-12-09 DIAGNOSIS — E87.1 HYPO-OSMOLALITY AND HYPONATREMIA: ICD-10-CM

## 2024-12-09 DIAGNOSIS — Z86.73 PERSONAL HISTORY OF TRANSIENT ISCHEMIC ATTACK (TIA), AND CEREBRAL INFARCTION WITHOUT RESIDUAL DEFICITS: ICD-10-CM

## 2024-12-09 DIAGNOSIS — I25.10 ATHEROSCLEROTIC HEART DISEASE OF NATIVE CORONARY ARTERY WITHOUT ANGINA PECTORIS: ICD-10-CM

## 2024-12-09 DIAGNOSIS — I48.0 PAROXYSMAL ATRIAL FIBRILLATION: ICD-10-CM

## 2024-12-09 DIAGNOSIS — J45.901 UNSPECIFIED ASTHMA WITH (ACUTE) EXACERBATION: ICD-10-CM

## 2024-12-09 DIAGNOSIS — E87.20 ACIDOSIS, UNSPECIFIED: ICD-10-CM

## 2024-12-09 DIAGNOSIS — I70.0 ATHEROSCLEROSIS OF AORTA: ICD-10-CM

## 2024-12-09 DIAGNOSIS — I11.0 HYPERTENSIVE HEART DISEASE WITH HEART FAILURE: ICD-10-CM

## 2024-12-09 DIAGNOSIS — K80.20 CALCULUS OF GALLBLADDER WITHOUT CHOLECYSTITIS WITHOUT OBSTRUCTION: ICD-10-CM

## 2024-12-09 DIAGNOSIS — Z88.5 ALLERGY STATUS TO NARCOTIC AGENT: ICD-10-CM

## 2024-12-09 DIAGNOSIS — Z88.1 ALLERGY STATUS TO OTHER ANTIBIOTIC AGENTS: ICD-10-CM

## 2024-12-09 DIAGNOSIS — Z95.1 PRESENCE OF AORTOCORONARY BYPASS GRAFT: ICD-10-CM

## 2024-12-09 DIAGNOSIS — I35.0 NONRHEUMATIC AORTIC (VALVE) STENOSIS: ICD-10-CM

## 2024-12-09 DIAGNOSIS — M51.34 OTHER INTERVERTEBRAL DISC DEGENERATION, THORACIC REGION: ICD-10-CM

## 2024-12-09 DIAGNOSIS — I50.23 ACUTE ON CHRONIC SYSTOLIC (CONGESTIVE) HEART FAILURE: ICD-10-CM

## 2024-12-09 DIAGNOSIS — E44.0 MODERATE PROTEIN-CALORIE MALNUTRITION: ICD-10-CM

## 2024-12-09 DIAGNOSIS — K57.30 DIVERTICULOSIS OF LARGE INTESTINE WITHOUT PERFORATION OR ABSCESS WITHOUT BLEEDING: ICD-10-CM

## 2024-12-09 DIAGNOSIS — Z88.8 ALLERGY STATUS TO OTHER DRUGS, MEDICAMENTS AND BIOLOGICAL SUBSTANCES: ICD-10-CM

## 2024-12-09 DIAGNOSIS — E86.0 DEHYDRATION: ICD-10-CM

## 2024-12-09 DIAGNOSIS — Z79.899 OTHER LONG TERM (CURRENT) DRUG THERAPY: ICD-10-CM

## 2024-12-09 DIAGNOSIS — I49.3 VENTRICULAR PREMATURE DEPOLARIZATION: ICD-10-CM

## 2024-12-09 DIAGNOSIS — M48.54XA COLLAPSED VERTEBRA, NOT ELSEWHERE CLASSIFIED, THORACIC REGION, INITIAL ENCOUNTER FOR FRACTURE: ICD-10-CM

## 2024-12-09 DIAGNOSIS — J44.1 CHRONIC OBSTRUCTIVE PULMONARY DISEASE WITH (ACUTE) EXACERBATION: ICD-10-CM

## 2024-12-11 ENCOUNTER — TRANSCRIPTION ENCOUNTER (OUTPATIENT)
Age: 88
End: 2024-12-11

## 2024-12-18 ENCOUNTER — TRANSCRIPTION ENCOUNTER (OUTPATIENT)
Age: 88
End: 2024-12-18

## 2025-04-09 NOTE — DIETITIAN INITIAL EVALUATION ADULT - ENTER TO (CAL/KG)
Well controlled.   Hypertension Medications              furosemide (LASIX) 40 MG tablet Take 40 mg by mouth once daily.    losartan (COZAAR) 25 MG tablet Take 1 tablet (25 mg total) by mouth once daily.          AHA/ACC goal <130/80. JNC8 goal <140/90 (all ages if DM or CKD OR <60), <150/90 (>60 w/o CKD or DM)  Low Sodium Diet (DASH Diet - Less than 2 grams of sodium per day).  Monitor blood pressure daily and log. Report consistent numbers greater than 140/90.  Maintain healthy weight with goal BMI <30. Exercise 30 minutes per day, 5 days per week.  Smoking cessation encouraged to aid in BP reduction.     35